# Patient Record
Sex: FEMALE | NOT HISPANIC OR LATINO | Employment: OTHER | URBAN - METROPOLITAN AREA
[De-identification: names, ages, dates, MRNs, and addresses within clinical notes are randomized per-mention and may not be internally consistent; named-entity substitution may affect disease eponyms.]

---

## 2017-03-28 ENCOUNTER — TELEPHONE (OUTPATIENT)
Dept: INTERNAL MEDICINE | Facility: CLINIC | Age: 67
End: 2017-03-28

## 2017-03-28 NOTE — TELEPHONE ENCOUNTER
Panel Management Review      Patient has the following on her problem list:     Asthma review     ACT Total Scores 10/28/2013   ACT TOTAL SCORE 18   ASTHMA ER VISITS 0 = None   ASTHMA HOSPITALIZATIONS 0 = None      1. Is Asthma diagnosis on the Problem List? Yes    2. Is Asthma listed on Health Maintenance? Yes    3. Patient is due for:  ACT and AAP      Composite cancer screening  Chart review shows that this patient is due/due soon for the following Pap Smear, Mammogram and Colonoscopy  Summary:    Patient is due/failing the following:   AAP, ACT, COLONOSCOPY, MAMMOGRAM and PAP    Action needed:   Patient needs office visit for as above.    Type of outreach:    Phone, left message for patient to call back.     Questions for provider review:    None                                                                                                                                    LIZ Camilo LPN       Chart routed to Wild.

## 2017-03-28 NOTE — LETTER
[facility]  [address], [address]  [place], [place]  [address]                                            TEL:[phone]  FAX:[phone]      Kalani Rowan  [address] [place] [address]      April 18, 2017    Dear Kalani,          At [facility], we care about your health and well-being. A review of your chart has indicated that you are due for a physical. Please contact us at [phone] to schedule an appointment.     If you have already had one or all of the above screening tests at another facility, please call us to update your chart.        Sincerely,      Lucia Lind M.D.

## 2017-04-04 ENCOUNTER — TELEPHONE (OUTPATIENT)
Dept: INTERNAL MEDICINE | Facility: CLINIC | Age: 67
End: 2017-04-04

## 2017-04-04 NOTE — TELEPHONE ENCOUNTER
4/4/2017    Call Regarding Preventive Health Screening Colonoscopy, Mammogram and Cervical/PAP    Attempt 1    Message on voicemail     Comments:       Outreach   CC

## 2017-07-15 ENCOUNTER — HEALTH MAINTENANCE LETTER (OUTPATIENT)
Age: 67
End: 2017-07-15

## 2018-02-06 ENCOUNTER — OFFICE VISIT (OUTPATIENT)
Dept: INTERNAL MEDICINE | Facility: CLINIC | Age: 68
End: 2018-02-06
Payer: COMMERCIAL

## 2018-02-06 VITALS
DIASTOLIC BLOOD PRESSURE: 80 MMHG | TEMPERATURE: 97.8 F | BODY MASS INDEX: 25.96 KG/M2 | SYSTOLIC BLOOD PRESSURE: 134 MMHG | HEIGHT: 59 IN | OXYGEN SATURATION: 98 % | HEART RATE: 76 BPM | WEIGHT: 128.8 LBS

## 2018-02-06 DIAGNOSIS — K21.9 GASTROESOPHAGEAL REFLUX DISEASE WITHOUT ESOPHAGITIS: ICD-10-CM

## 2018-02-06 DIAGNOSIS — M70.61 GREATER TROCHANTERIC BURSITIS OF RIGHT HIP: ICD-10-CM

## 2018-02-06 DIAGNOSIS — J45.20 MILD INTERMITTENT ASTHMA WITHOUT COMPLICATION: ICD-10-CM

## 2018-02-06 DIAGNOSIS — R10.31 ABDOMINAL PAIN, RIGHT LOWER QUADRANT: ICD-10-CM

## 2018-02-06 DIAGNOSIS — Z00.00 ROUTINE GENERAL MEDICAL EXAMINATION AT A HEALTH CARE FACILITY: Primary | ICD-10-CM

## 2018-02-06 LAB
ERYTHROCYTE [DISTWIDTH] IN BLOOD BY AUTOMATED COUNT: 13.5 % (ref 10–15)
HCT VFR BLD AUTO: 44.2 % (ref 35–47)
HGB BLD-MCNC: 15 G/DL (ref 11.7–15.7)
MCH RBC QN AUTO: 30.1 PG (ref 26.5–33)
MCHC RBC AUTO-ENTMCNC: 33.9 G/DL (ref 31.5–36.5)
MCV RBC AUTO: 89 FL (ref 78–100)
PLATELET # BLD AUTO: 287 10E9/L (ref 150–450)
RBC # BLD AUTO: 4.99 10E12/L (ref 3.8–5.2)
WBC # BLD AUTO: 5.8 10E9/L (ref 4–11)

## 2018-02-06 PROCEDURE — 99397 PER PM REEVAL EST PAT 65+ YR: CPT | Performed by: INTERNAL MEDICINE

## 2018-02-06 PROCEDURE — 86803 HEPATITIS C AB TEST: CPT | Performed by: INTERNAL MEDICINE

## 2018-02-06 PROCEDURE — 36415 COLL VENOUS BLD VENIPUNCTURE: CPT | Performed by: INTERNAL MEDICINE

## 2018-02-06 PROCEDURE — 84443 ASSAY THYROID STIM HORMONE: CPT | Performed by: INTERNAL MEDICINE

## 2018-02-06 PROCEDURE — 85027 COMPLETE CBC AUTOMATED: CPT | Performed by: INTERNAL MEDICINE

## 2018-02-06 PROCEDURE — 80061 LIPID PANEL: CPT | Performed by: INTERNAL MEDICINE

## 2018-02-06 PROCEDURE — 80053 COMPREHEN METABOLIC PANEL: CPT | Performed by: INTERNAL MEDICINE

## 2018-02-06 RX ORDER — AMOXICILLIN 500 MG
1400 CAPSULE ORAL DAILY
COMMUNITY
End: 2019-11-02

## 2018-02-06 ASSESSMENT — ACTIVITIES OF DAILY LIVING (ADL)
CURRENT_FUNCTION: NO ASSISTANCE NEEDED
I_NEED_ASSISTANCE_FOR_THE_FOLLOWING_DAILY_ACTIVITIES:: NO ASSISTANCE IS NEEDED

## 2018-02-06 NOTE — PROGRESS NOTES
SUBJECTIVE:   Kalani Rowan is a 67 year old female who presents for Preventive Visit.      Are you in the first 12 months of your Medicare coverage?  No    Physical   Annual:     Getting at least 3 servings of Calcium per day::  Yes    Bi-annual eye exam::  Yes    Dental care twice a year::  Yes    Sleep apnea or symptoms of sleep apnea::  None    Diet::  Low salt    Frequency of exercise::  2-3 days/week    Duration of exercise::  15-30 minutes    Taking medications regularly::  No    Barriers to taking medications::  None    Additional concerns today::  YES    Ability to successfully perform activities of daily living: no assistance needed  Home Safety:  No safety concerns identified  Hearing Impairment: no hearing concerns      Ability to successfully perform activities of daily living: Yes, no assistance needed  Home safety:  none identified   Hearing impairment: NO    Fall risk:     click delete button to remove this line now  COGNITIVE SCREEN  1) Repeat 3 items (Banana, Sunrise, Chair)    2) Clock draw: NORMAL  3) 3 item recall: Recalls 2 objects   Results: NORMAL clock, 1-2 items recalled: COGNITIVE IMPAIRMENT LESS LIKELY    Mini-CogTM Copyright ANGELA Palencia. Licensed by the author for use in [facility]; reprinted with permission (phyllis@[facility]). All rights reserved.        Reviewed and updated as needed this visit by clinical staff  Tobacco  Allergies  Meds  Med Hx  Surg Hx  Fam Hx  Soc Hx        Reviewed and updated as needed this visit by Provider        Social History   Substance Use Topics     Smoking status: Never Smoker     Smokeless tobacco: Never Used     Alcohol use No       Alcohol Use 2/6/2018   If you drink alcohol, do you typically have greater than 3 drinks per day OR greater than 7 drinks per week?   Not applicable               Today's PHQ-2 Score:   PHQ-2 ( 1999 [employer]) 2/6/2018   Q1: Little interest or pleasure in doing things -   Q2: Feeling down, depressed or hopeless -    PHQ-2 Score -   Q1: Little interest or pleasure in doing things Nearly every day   Q2: Feeling down, depressed or hopeless Not at all   PHQ-2 Score 3       Do you feel safe in your environment - Yes    Do you have a Health Care Directive?: No: Advance care planning was reviewed with patient; patient declined at this time.    Current providers sharing in care for this patient include:   Patient Care Team:  Lucia Lind MD as PCP - General    The following health maintenance items are reviewed in Epic and correct as of today:  Health Maintenance   Topic Date Due     TETANUS IMMUNIZATION (SYSTEM ASSIGNED)  09/27/1969     HEPATITIS C SCREENING  09/27/1969     COLON CANCER SCREEN (SYSTEM ASSIGNED)  09/27/2001     ASTHMA CONTROL TEST Q6 MOS  04/28/2014     ASTHMA ACTION PLAN Q1 YR  10/28/2014     MAMMO SCREEN Q2 YR (SYSTEM ASSIGNED)  11/04/2015     FALL RISK ASSESSMENT  09/27/2016     DEXA SCAN SCREENING (SYSTEM ASSIGNED)  09/27/2016     PNEUMOCOCCAL (1 of 2 - PCV13) 09/27/2016     PAP Q3 YR  10/28/2016     INFLUENZA VACCINE (SYSTEM ASSIGNED)  09/01/2017     ADVANCE DIRECTIVE PLANNING Q5 YRS  11/18/2018     LIPID SCREEN Q5 YR FEMALE (SYSTEM ASSIGNED)  04/27/2021     BP Readings from Last 3 Encounters:   02/06/18 134/80   04/27/16 132/88   05/11/14 150/74    Wt Readings from Last 3 Encounters:   02/06/18 128 lb 12.8 oz (58.4 kg)   04/27/16 127 lb 4.8 oz (57.7 kg)   01/22/14 127 lb (57.6 kg)                        Review of Systems  C: NEGATIVE for fever, chills, change in weight  I: NEGATIVE for worrisome rashes, moles or lesions  E: NEGATIVE for vision changes or irritation  E/M: NEGATIVE for ear, mouth and throat problems  R: NEGATIVE for significant cough or SOB  B: NEGATIVE for masses, tenderness or discharge  CV: NEGATIVE for chest pain, palpitations or peripheral edema  GI: she has been getting intermittent right upper quadrant pain that is sharp and lasts less than 10 minutes. She does have chronic  "constipation. No nausea or vomiting. Denies any fever chills or night sweats.   : NEGATIVE for frequency, dysuria, or hematuria  M: right lateral hip pian with walking or laying on that side for a couple months but getting betterN: NEGATIVE for weakness, dizziness or paresthesias  E: NEGATIVE for temperature intolerance, skin/hair changes  H: NEGATIVE for bleeding problems  P: NEGATIVE for changes in mood or affect    OBJECTIVE:   /80 (BP Location: Left arm, Patient Position: Sitting, Cuff Size: Adult Regular)  Pulse 76  Temp 97.8  F (36.6  C) (Oral)  Ht 4' 10.5\" (1.486 m)  Wt 128 lb 12.8 oz (58.4 kg)  SpO2 98%  Breastfeeding? No  BMI 26.46 kg/m2 Estimated body mass index is 26.46 kg/(m^2) as calculated from the following:    Height as of this encounter: 4' 10.5\" (1.486 m).    Weight as of this encounter: 128 lb 12.8 oz (58.4 kg).  Physical Exam  GENERAL: healthy, alert and no distress  EYES: Eyes grossly normal to inspection, PERRL and conjunctivae and sclerae normal  HENT: ear canals and TM's normal, nose and mouth without ulcers or lesions  NECK: no adenopathy, no asymmetry, masses, or scars and thyroid normal to palpation  RESP: lungs clear to auscultation - no rales, rhonchi or wheezes  BREAST: normal without masses, tenderness or nipple discharge and no palpable axillary masses or adenopathy  CV: regular rate and rhythm, normal S1 S2, no S3 or S4, no murmur, click or rub, no peripheral edema and peripheral pulses strong  ABDOMEN: soft, nontender, no hepatosplenomegaly, no masses and bowel sounds normal  MS: no gross musculoskeletal defects noted, no edema  SKIN: no suspicious lesions or rashes  NEURO: Normal strength and tone, mentation intact and speech normal  PSYCH: mentation appears normal, affect normal/bright    ASSESSMENT / PLAN:   1. Routine general medical examination at a health care facility     - GASTROENTEROLOGY ADULT REF PROCEDURE ONLY [facility] [phone]; No Provider " "Preference  - Hepatitis C Screen Reflex to HCV RNA Quant and Genotype  - *MA Screening Digital Bilateral; Future  - Comprehensive metabolic panel  - CBC with platelets  - TSH with free T4 reflex  - Lipid panel reflex to direct LDL Fasting    2. Abdominal pain, right lower quadrant  Will check ultrasound but suspect musculoskeletal vs constipation as cause of pain.  - US Abdomen Complete; Future    3. Greater trochanteric bursitis of right hip  Will follow clinically for now. As it seems to be improviong    4. Mild intermittent asthma without complication   under good control     5. Gastroesophageal reflux disease without esophagitis  under good control       End of Life Planning:  Patient currently has an advanced directive: No.  I have verified the patient's ablity to prepare an advanced directive/make health care decisions.  Literature was provided to assist patient in preparing an advanced directive.    COUNSELING:  Reviewed preventive health counseling, as reflected in patient instructions       Regular exercise       Healthy diet/nutrition    BP Screening:   Last 3 BP Readings:    BP Readings from Last 3 Encounters:   02/06/18 134/80   04/27/16 132/88   05/11/14 150/74       The following was recommended to the patient:  Re-screen BP within a year and recommended lifestyle modifications    Estimated body mass index is 26.46 kg/(m^2) as calculated from the following:    Height as of this encounter: 4' 10.5\" (1.486 m).    Weight as of this encounter: 128 lb 12.8 oz (58.4 kg).     reports that she has never smoked. She has never used smokeless tobacco.      Appropriate preventive services were discussed with this patient, including applicable screening as appropriate for cardiovascular disease, diabetes, osteopenia/osteoporosis, and glaucoma.  As appropriate for age/gender, discussed screening for colorectal cancer, prostate cancer, breast cancer, and cervical cancer. Checklist reviewing preventive services " available has been given to the patient.    Reviewed patients plan of care and provided an AVS. The Basic Care Plan (routine screening as documented in Health Maintenance) for Kalani meets the Care Plan requirement. This Care Plan has been established and reviewed with the Patient.    Counseling Resources:  ATP IV Guidelines  Pooled Cohorts Equation Calculator  Breast Cancer Risk Calculator  FRAX Risk Assessment  ICSI Preventive Guidelines  Dietary Guidelines for Americans, 2010  USDA's MyPlate  ASA Prophylaxis  Lung CA Screening    Lucia Lind MD  [facility]

## 2018-02-06 NOTE — NURSING NOTE
"Chief Complaint   Patient presents with     Physical       Initial /80 (BP Location: Left arm, Patient Position: Sitting, Cuff Size: Adult Regular)  Pulse 76  Temp 97.8  F (36.6  C) (Oral)  Ht 4' 10.5\" (1.486 m)  Wt 128 lb 12.8 oz (58.4 kg)  SpO2 98%  Breastfeeding? No  BMI 26.46 kg/m2 Estimated body mass index is 26.46 kg/(m^2) as calculated from the following:    Height as of this encounter: 4' 10.5\" (1.486 m).    Weight as of this encounter: 128 lb 12.8 oz (58.4 kg).  Medication Reconciliation: complete   IA SMA    "

## 2018-02-06 NOTE — MR AVS SNAPSHOT
After Visit Summary   2/6/2018    Kalani Rowan    MRN: [id number]           Patient Information     Date Of Birth          1950        Visit Information        Provider Department      2/6/2018 8:20 AM Lucia Lind MD [facility]        Today's Diagnoses     Routine general medical examination at a health care facility    -  1    Abdominal pain, right lower quadrant        Greater trochanteric bursitis of right hip        Mild intermittent asthma without complication        Gastroesophageal reflux disease without esophagitis           Follow-ups after your visit        Additional Services     GASTROENTEROLOGY ADULT REF PROCEDURE ONLY [facility] [phone]; No Provider Preference       Last Lab Result: Creatinine (mg/dL)       Date                     Value                 04/27/2016               0.68             ----------  Body mass index is 26.46 kg/(m^2).     Needed:  No  Language:  English    Patient will be contacted to schedule procedure.     Please be aware that coverage of these services is subject to the terms and limitations of your health insurance plan.  Call member services at your health plan with any benefit or coverage questions.  Any procedures must be performed at a [facility] facility OR coordinated by your clinic's referral office.    Please bring the following with you to your appointment:    (1) Any X-Rays, CTs or MRIs which have been performed.  Contact the facility where they were done to arrange for  prior to your scheduled appointment.    (2) List of current medications   (3) This referral request   (4) Any documents/labs given to you for this referral                  Future tests that were ordered for you today     Open Future Orders        Priority Expected Expires Ordered    *MA Screening Digital Bilateral Routine  2/6/2019 2/6/2018    Lipid Profile Routine  5/9/2018 2/6/2018    US Abdomen Complete Routine  2/6/2019  "2018            Who to contact     If you have questions or need follow up information about today's clinic visit or your schedule please contact [facility] directly at [phone].  Normal or non-critical lab and imaging results will be communicated to you by MyChart, letter or phone within 4 business days after the clinic has received the results. If you do not hear from us within 7 days, please contact the clinic through MyChart or phone. If you have a critical or abnormal lab result, we will notify you by phone as soon as possible.  Submit refill requests through [employer] or call your pharmacy and they will forward the refill request to us. Please allow 3 business days for your refill to be completed.          Additional Information About Your Visit        [facility]State [facility] CenterLast Guide Information     [employer] lets you send messages to your doctor, view your test results, renew your prescriptions, schedule appointments and more. To sign up, go to [email or web address] . Click on \"Log in\" on the left side of the screen, which will take you to the Welcome page. Then click on \"Sign up Now\" on the right side of the page.     You will be asked to enter the access code listed below, as well as some personal information. Please follow the directions to create your username and password.     Your access code is: [email or web address]  Expires: 2018  9:28 AM     Your access code will  in 90 days. If you need help or a new code, please call your [facility] clinic or [phone].        Care EveryWhere ID     This is your Care EveryWhere ID. This could be used by other organizations to access your [facility] medical records  [id number]        Your Vitals Were     Pulse Temperature Height Pulse Oximetry Breastfeeding? BMI (Body Mass Index)    76 97.8  F (36.6  C) (Oral) 4' 10.5\" (1.486 m) 98% No 26.46 kg/m2       Blood Pressure from Last 3 Encounters:   18 134/80   16 132/88   14 150/74    Weight from Last " 3 Encounters:   02/06/18 128 lb 12.8 oz (58.4 kg)   04/27/16 127 lb 4.8 oz (57.7 kg)   01/22/14 127 lb (57.6 kg)              We Performed the Following     CBC with platelets     Comprehensive metabolic panel     GASTROENTEROLOGY ADULT REF PROCEDURE ONLY [facility] [phone]; No Provider Preference     Hepatitis C Screen Reflex to HCV RNA Quant and Genotype     TSH with free T4 reflex        Primary Care Provider Office Phone # Fax #    Luciarobe Lind [phone]       [address] [facility] [address]  [facility] [address]        Equal Access to Services     [facility]: Hadii aad ku hadasho Soomaali, waaxda luqadaha, qaybta kaalmada adeegyada, leodan lopez . So [facility] [phone].    ATENCIÓN: Si habla español, tiene a sampson disposición servicios gratuitos de asistencia lingüística. Llame al [phone].    We comply with applicable federal civil rights laws and [place] laws. We do not discriminate on the basis of race, color, national origin, age, disability, sex, sexual orientation, or gender identity.            Thank you!     Thank you for choosing [facility]  for your care. Our goal is always to provide you with excellent care. Hearing back from our patients is one way we can continue to improve our services. Please take a few minutes to complete the written survey that you may receive in the mail after your visit with us. Thank you!             Your Updated Medication List - Protect others around you: Learn how to safely use, store and throw away your medicines at www.disposemymeds.org.          This list is accurate as of 2/6/18  9:28 AM.  Always use your most recent med list.                   Brand Name Dispense Instructions for use Diagnosis    albuterol 108 (90 BASE) MCG/ACT Inhaler    PROAIR HFA    1 Inhaler    Inhale 1-2 puffs into the lungs every 6 hours as needed for shortness of breath / dyspnea    Mild intermittent asthma        ALLEGRA 180 MG tablet   Generic drug:  fexofenadine     1 month    TAKE ONE DAILY    Allergic rhinitis, cause unspecified       diphenhydrAMINE 25 MG tablet    BENADRYL    56 tablet    Take 2 tablets (50 mg) by mouth every 6 hours as needed for itching or allergies        EPINEPHrine 0.3 MG/0.3ML injection 2-pack    EPIPEN/ADRENACLICK/or ANY BX GENERIC EQUIV    1 each    Inject 0.3 mLs (0.3 mg) into the muscle once as needed for anaphylaxis        fish Oil 1200 MG capsule      Take 1,400 mg by mouth daily        NASONEX 50 MCG/ACT spray   Generic drug:  mometasone     1    INHALE 2 SPRAYS IN EACH NOSTRIL ONE DAILY    Mild intermittent asthma

## 2018-02-07 LAB
ALBUMIN SERPL-MCNC: 4.1 G/DL (ref 3.4–5)
ALP SERPL-CCNC: 120 U/L (ref 40–150)
ALT SERPL W P-5'-P-CCNC: 27 U/L (ref 0–50)
ANION GAP SERPL CALCULATED.3IONS-SCNC: 11 MMOL/L (ref 3–14)
AST SERPL W P-5'-P-CCNC: 21 U/L (ref 0–45)
BILIRUB SERPL-MCNC: 0.4 MG/DL (ref 0.2–1.3)
BUN SERPL-MCNC: 16 MG/DL (ref 7–30)
CALCIUM SERPL-MCNC: 9.5 MG/DL (ref 8.5–10.1)
CHLORIDE SERPL-SCNC: 108 MMOL/L (ref 94–109)
CHOLEST SERPL-MCNC: 324 MG/DL
CO2 SERPL-SCNC: 24 MMOL/L (ref 20–32)
CREAT SERPL-MCNC: 0.72 MG/DL (ref 0.52–1.04)
GFR SERPL CREATININE-BSD FRML MDRD: 81 ML/MIN/1.7M2
GLUCOSE SERPL-MCNC: 87 MG/DL (ref 70–99)
HCV AB SERPL QL IA: NONREACTIVE
HDLC SERPL-MCNC: 47 MG/DL
LDLC SERPL CALC-MCNC: 230 MG/DL
NONHDLC SERPL-MCNC: 277 MG/DL
POTASSIUM SERPL-SCNC: 3.9 MMOL/L (ref 3.4–5.3)
PROT SERPL-MCNC: 7.4 G/DL (ref 6.8–8.8)
SODIUM SERPL-SCNC: 143 MMOL/L (ref 133–144)
TRIGL SERPL-MCNC: 237 MG/DL
TSH SERPL DL<=0.005 MIU/L-ACNC: 2.57 MU/L (ref 0.4–4)

## 2018-02-07 ASSESSMENT — ASTHMA QUESTIONNAIRES: ACT_TOTALSCORE: 25

## 2018-02-16 ENCOUNTER — HOSPITAL ENCOUNTER (OUTPATIENT)
Dept: ULTRASOUND IMAGING | Facility: CLINIC | Age: 68
Discharge: HOME | End: 2018-02-16
Attending: INTERNAL MEDICINE | Admitting: INTERNAL MEDICINE
Payer: MEDICARE

## 2018-02-16 ENCOUNTER — HOSPITAL ENCOUNTER (OUTPATIENT)
Dept: MAMMOGRAPHY | Facility: CLINIC | Age: 68
End: 2018-02-16
Attending: INTERNAL MEDICINE
Payer: MEDICARE

## 2018-02-16 DIAGNOSIS — R10.31 ABDOMINAL PAIN, RIGHT LOWER QUADRANT: ICD-10-CM

## 2018-02-16 DIAGNOSIS — Z12.39 BREAST SCREENING: ICD-10-CM

## 2018-02-16 PROCEDURE — 76700 US EXAM ABDOM COMPLETE: CPT

## 2018-02-16 PROCEDURE — 77067 SCR MAMMO BI INCL CAD: CPT

## 2018-02-19 ENCOUNTER — TELEPHONE (OUTPATIENT)
Dept: INTERNAL MEDICINE | Facility: CLINIC | Age: 68
End: 2018-02-19

## 2018-02-19 NOTE — TELEPHONE ENCOUNTER
Please call her and let her know Her ultrasound looks normal she does have a small 1/2 area of dilated blood vessel in the liver called hemangioma but that would not cause her pain and is nothing to worry about. Mail her copy of results as well please

## 2018-03-07 ENCOUNTER — TRANSFERRED RECORDS (OUTPATIENT)
Dept: HEALTH INFORMATION MANAGEMENT | Facility: CLINIC | Age: 68
End: 2018-03-07

## 2018-04-06 ENCOUNTER — TRANSFERRED RECORDS (OUTPATIENT)
Dept: HEALTH INFORMATION MANAGEMENT | Facility: CLINIC | Age: 68
End: 2018-04-06

## 2018-04-16 ENCOUNTER — TRANSFERRED RECORDS (OUTPATIENT)
Dept: HEALTH INFORMATION MANAGEMENT | Facility: CLINIC | Age: 68
End: 2018-04-16

## 2018-04-30 ENCOUNTER — TELEPHONE (OUTPATIENT)
Dept: INTERNAL MEDICINE | Facility: CLINIC | Age: 68
End: 2018-04-30

## 2018-04-30 NOTE — TELEPHONE ENCOUNTER
Panel Management Review      Patient has the following on her problem list:     Asthma review     ACT Total Scores 2/6/2018   ACT TOTAL SCORE -   ASTHMA ER VISITS -   ASTHMA HOSPITALIZATIONS -   ACT TOTAL SCORE (Goal Greater than or Equal to 20) 25   In the past 12 months, how many times did you visit the emergency room for your asthma without being admitted to the hospital? 0   In the past 12 months, how many times were you hospitalized overnight because of your asthma? 0      1. Is Asthma diagnosis on the Problem List? Yes    2. Is Asthma listed on Health Maintenance? Yes    3. Patient is due for:  CUONG ALFREDO   ASA:     Last LDL:    Lab Results   Component Value Date    CHOL 324 02/06/2018     Lab Results   Component Value Date    HDL 47 02/06/2018     Lab Results   Component Value Date     02/06/2018     Lab Results   Component Value Date    TRIG 237 02/06/2018        Lab Results   Component Value Date    CHOLHDLRATIO 7.4 11/18/2013        Is the patient on a Statin? NO   Is the patient on Aspirin? YES                    Last three blood pressure readings:  BP Readings from Last 3 Encounters:   02/06/18 134/80   04/27/16 132/88   05/11/14 150/74        Tobacco History:     History   Smoking Status     Never Smoker   Smokeless Tobacco     Never Used         Composite cancer screening  Chart review shows that this patient is due/due soon for the following Pap Smear and Colonoscopy  Summary:    Patient is due/failing the following:   COLONOSCOPY    Action needed:   Patient needs referral/order: colonoscopy    Type of outreach:    Sent letter.    Questions for provider review:    OK to turn off pap? Will review at next visit as provider on SHABBIR Camilo LPN       Chart routed to none.

## 2018-04-30 NOTE — LETTER
[facility]  [address], [address]  [place], [place]  [address]                                            TEL:[phone]  FAX:[phone]      Kalani Rowan  [address] [place] [address]      April 30, 2018    Dear Kalani,          At [facility], we care about your health and well-being. A review of your chart has indicated that you are due for a colonoscopy.     If you have already had one or all of the above screening tests at another facility, please call us to update your chart.        Sincerely,      Lucia Lind M.D.

## 2019-11-02 ENCOUNTER — APPOINTMENT (OUTPATIENT)
Dept: CT IMAGING | Facility: CLINIC | Age: 69
DRG: 305 | End: 2019-11-02
Attending: EMERGENCY MEDICINE
Payer: COMMERCIAL

## 2019-11-02 ENCOUNTER — APPOINTMENT (OUTPATIENT)
Dept: MRI IMAGING | Facility: CLINIC | Age: 69
DRG: 305 | End: 2019-11-02
Attending: INTERNAL MEDICINE
Payer: COMMERCIAL

## 2019-11-02 ENCOUNTER — HOSPITAL ENCOUNTER (INPATIENT)
Facility: CLINIC | Age: 69
LOS: 1 days | Discharge: HOME | DRG: 305 | End: 2019-11-03
Attending: EMERGENCY MEDICINE | Admitting: INTERNAL MEDICINE
Payer: COMMERCIAL

## 2019-11-02 DIAGNOSIS — I63.9 CEREBROVASCULAR ACCIDENT (CVA), UNSPECIFIED MECHANISM (H): ICD-10-CM

## 2019-11-02 LAB
ALBUMIN SERPL-MCNC: 4.4 G/DL (ref 3.4–5)
ALBUMIN UR-MCNC: NEGATIVE MG/DL
ALP SERPL-CCNC: 155 U/L (ref 40–150)
ALT SERPL W P-5'-P-CCNC: 36 U/L (ref 0–50)
ANION GAP SERPL CALCULATED.3IONS-SCNC: 8 MMOL/L (ref 3–14)
APPEARANCE UR: CLEAR
APTT PPP: 27 SEC (ref 22–37)
AST SERPL W P-5'-P-CCNC: 22 U/L (ref 0–45)
BASOPHILS # BLD AUTO: 0 10E9/L (ref 0–0.2)
BASOPHILS NFR BLD AUTO: 0.4 %
BILIRUB DIRECT SERPL-MCNC: <0.1 MG/DL (ref 0–0.2)
BILIRUB SERPL-MCNC: 0.4 MG/DL (ref 0.2–1.3)
BILIRUB UR QL STRIP: NEGATIVE
BUN SERPL-MCNC: 19 MG/DL (ref 7–30)
CALCIUM SERPL-MCNC: 9.6 MG/DL (ref 8.5–10.1)
CHLORIDE SERPL-SCNC: 106 MMOL/L (ref 94–109)
CHOLEST SERPL-MCNC: 377 MG/DL
CO2 SERPL-SCNC: 23 MMOL/L (ref 20–32)
COLOR UR AUTO: NORMAL
CREAT SERPL-MCNC: 0.65 MG/DL (ref 0.52–1.04)
DIFFERENTIAL METHOD BLD: ABNORMAL
EOSINOPHIL # BLD AUTO: 0.1 10E9/L (ref 0–0.7)
EOSINOPHIL NFR BLD AUTO: 1.9 %
ERYTHROCYTE [DISTWIDTH] IN BLOOD BY AUTOMATED COUNT: 13.3 % (ref 10–15)
GFR SERPL CREATININE-BSD FRML MDRD: >90 ML/MIN/{1.73_M2}
GLUCOSE BLDC GLUCOMTR-MCNC: 131 MG/DL (ref 70–99)
GLUCOSE SERPL-MCNC: 107 MG/DL (ref 70–99)
GLUCOSE UR STRIP-MCNC: NEGATIVE MG/DL
HBA1C MFR BLD: 5.3 % (ref 0–5.6)
HCT VFR BLD AUTO: 47.2 % (ref 35–47)
HDLC SERPL-MCNC: 54 MG/DL
HGB BLD-MCNC: 16.6 G/DL (ref 11.7–15.7)
HGB UR QL STRIP: NEGATIVE
IMM GRANULOCYTES # BLD: 0 10E9/L (ref 0–0.4)
IMM GRANULOCYTES NFR BLD: 0.1 %
INR PPP: 0.88 (ref 0.86–1.14)
INTERPRETATION ECG - MUSE: NORMAL
KETONES UR STRIP-MCNC: NEGATIVE MG/DL
LDLC SERPL CALC-MCNC: ABNORMAL MG/DL
LDLC SERPL DIRECT ASSAY-MCNC: 247 MG/DL
LEUKOCYTE ESTERASE UR QL STRIP: NEGATIVE
LYMPHOCYTES # BLD AUTO: 2.8 10E9/L (ref 0.8–5.3)
LYMPHOCYTES NFR BLD AUTO: 39.9 %
MCH RBC QN AUTO: 30.7 PG (ref 26.5–33)
MCHC RBC AUTO-ENTMCNC: 35.2 G/DL (ref 31.5–36.5)
MCV RBC AUTO: 87 FL (ref 78–100)
MONOCYTES # BLD AUTO: 0.4 10E9/L (ref 0–1.3)
MONOCYTES NFR BLD AUTO: 5.2 %
NEUTROPHILS # BLD AUTO: 3.6 10E9/L (ref 1.6–8.3)
NEUTROPHILS NFR BLD AUTO: 52.5 %
NITRATE UR QL: NEGATIVE
NONHDLC SERPL-MCNC: 323 MG/DL
NRBC # BLD AUTO: 0 10*3/UL
NRBC BLD AUTO-RTO: 0 /100
PH UR STRIP: 6.5 PH (ref 5–7)
PLATELET # BLD AUTO: 286 10E9/L (ref 150–450)
POTASSIUM SERPL-SCNC: 3.6 MMOL/L (ref 3.4–5.3)
PROT SERPL-MCNC: 8.8 G/DL (ref 6.8–8.8)
RBC # BLD AUTO: 5.41 10E12/L (ref 3.8–5.2)
RBC #/AREA URNS AUTO: 1 /HPF (ref 0–2)
SODIUM SERPL-SCNC: 137 MMOL/L (ref 133–144)
SOURCE: NORMAL
SP GR UR STRIP: 1.01 (ref 1–1.03)
SQUAMOUS #/AREA URNS AUTO: <1 /HPF (ref 0–1)
TRIGL SERPL-MCNC: 547 MG/DL
TROPONIN I SERPL-MCNC: <0.015 UG/L (ref 0–0.04)
TSH SERPL DL<=0.005 MIU/L-ACNC: 3.94 MU/L (ref 0.4–4)
UROBILINOGEN UR STRIP-MCNC: NORMAL MG/DL (ref 0–2)
WBC # BLD AUTO: 6.9 10E9/L (ref 4–11)
WBC #/AREA URNS AUTO: <1 /HPF (ref 0–5)

## 2019-11-02 PROCEDURE — 25000132 ZZH RX MED GY IP 250 OP 250 PS 637: Performed by: EMERGENCY MEDICINE

## 2019-11-02 PROCEDURE — 99291 CRITICAL CARE FIRST HOUR: CPT | Performed by: PHYSICIAN ASSISTANT

## 2019-11-02 PROCEDURE — 70498 CT ANGIOGRAPHY NECK: CPT

## 2019-11-02 PROCEDURE — 85610 PROTHROMBIN TIME: CPT | Performed by: EMERGENCY MEDICINE

## 2019-11-02 PROCEDURE — 85730 THROMBOPLASTIN TIME PARTIAL: CPT | Performed by: EMERGENCY MEDICINE

## 2019-11-02 PROCEDURE — 25800030 ZZH RX IP 258 OP 636: Performed by: EMERGENCY MEDICINE

## 2019-11-02 PROCEDURE — 70553 MRI BRAIN STEM W/O & W/DYE: CPT

## 2019-11-02 PROCEDURE — 80076 HEPATIC FUNCTION PANEL: CPT | Performed by: EMERGENCY MEDICINE

## 2019-11-02 PROCEDURE — 25800030 ZZH RX IP 258 OP 636: Performed by: INTERNAL MEDICINE

## 2019-11-02 PROCEDURE — 81001 URINALYSIS AUTO W/SCOPE: CPT | Performed by: INTERNAL MEDICINE

## 2019-11-02 PROCEDURE — 25000132 ZZH RX MED GY IP 250 OP 250 PS 637: Performed by: INTERNAL MEDICINE

## 2019-11-02 PROCEDURE — 25500064 ZZH RX 255 OP 636: Performed by: INTERNAL MEDICINE

## 2019-11-02 PROCEDURE — 99222 1ST HOSP IP/OBS MODERATE 55: CPT | Mod: AI | Performed by: INTERNAL MEDICINE

## 2019-11-02 PROCEDURE — 25000128 H RX IP 250 OP 636: Performed by: EMERGENCY MEDICINE

## 2019-11-02 PROCEDURE — A9585 GADOBUTROL INJECTION: HCPCS | Performed by: INTERNAL MEDICINE

## 2019-11-02 PROCEDURE — 84484 ASSAY OF TROPONIN QUANT: CPT | Performed by: EMERGENCY MEDICINE

## 2019-11-02 PROCEDURE — 25000125 ZZHC RX 250: Performed by: EMERGENCY MEDICINE

## 2019-11-02 PROCEDURE — 96360 HYDRATION IV INFUSION INIT: CPT

## 2019-11-02 PROCEDURE — 12000000 ZZH R&B MED SURG/OB

## 2019-11-02 PROCEDURE — 80061 LIPID PANEL: CPT | Performed by: EMERGENCY MEDICINE

## 2019-11-02 PROCEDURE — 85025 COMPLETE CBC W/AUTO DIFF WBC: CPT | Performed by: EMERGENCY MEDICINE

## 2019-11-02 PROCEDURE — 83721 ASSAY OF BLOOD LIPOPROTEIN: CPT | Performed by: EMERGENCY MEDICINE

## 2019-11-02 PROCEDURE — 70450 CT HEAD/BRAIN W/O DYE: CPT

## 2019-11-02 PROCEDURE — 80048 BASIC METABOLIC PNL TOTAL CA: CPT | Performed by: EMERGENCY MEDICINE

## 2019-11-02 PROCEDURE — 00000146 ZZHCL STATISTIC GLUCOSE BY METER IP

## 2019-11-02 PROCEDURE — 0042T CT HEAD PERFUSION WITH CONTRAST: CPT

## 2019-11-02 PROCEDURE — 93005 ELECTROCARDIOGRAM TRACING: CPT

## 2019-11-02 PROCEDURE — 84443 ASSAY THYROID STIM HORMONE: CPT | Performed by: EMERGENCY MEDICINE

## 2019-11-02 PROCEDURE — 83036 HEMOGLOBIN GLYCOSYLATED A1C: CPT | Performed by: EMERGENCY MEDICINE

## 2019-11-02 PROCEDURE — 99285 EMERGENCY DEPT VISIT HI MDM: CPT | Mod: 25

## 2019-11-02 RX ORDER — POTASSIUM CHLORIDE 1.5 G/1.58G
20-40 POWDER, FOR SOLUTION ORAL
Status: DISCONTINUED | OUTPATIENT
Start: 2019-11-02 | End: 2019-11-03 | Stop reason: HOSPADM

## 2019-11-02 RX ORDER — AMOXICILLIN 250 MG
2 CAPSULE ORAL 2 TIMES DAILY PRN
Status: DISCONTINUED | OUTPATIENT
Start: 2019-11-02 | End: 2019-11-03 | Stop reason: HOSPADM

## 2019-11-02 RX ORDER — IOPAMIDOL 755 MG/ML
120 INJECTION, SOLUTION INTRAVASCULAR ONCE
Status: COMPLETED | OUTPATIENT
Start: 2019-11-02 | End: 2019-11-02

## 2019-11-02 RX ORDER — UBIDECARENONE 100 MG
100 CAPSULE ORAL DAILY
COMMUNITY
End: 2022-08-12

## 2019-11-02 RX ORDER — ACETAMINOPHEN 325 MG/1
650 TABLET ORAL EVERY 4 HOURS PRN
Status: DISCONTINUED | OUTPATIENT
Start: 2019-11-02 | End: 2019-11-03 | Stop reason: HOSPADM

## 2019-11-02 RX ORDER — ONDANSETRON 2 MG/ML
4 INJECTION INTRAMUSCULAR; INTRAVENOUS EVERY 6 HOURS PRN
Status: DISCONTINUED | OUTPATIENT
Start: 2019-11-02 | End: 2019-11-03 | Stop reason: HOSPADM

## 2019-11-02 RX ORDER — NALOXONE HYDROCHLORIDE 0.4 MG/ML
.1-.4 INJECTION, SOLUTION INTRAMUSCULAR; INTRAVENOUS; SUBCUTANEOUS
Status: DISCONTINUED | OUTPATIENT
Start: 2019-11-02 | End: 2019-11-03 | Stop reason: HOSPADM

## 2019-11-02 RX ORDER — POTASSIUM CHLORIDE 7.45 MG/ML
10 INJECTION INTRAVENOUS
Status: DISCONTINUED | OUTPATIENT
Start: 2019-11-02 | End: 2019-11-03 | Stop reason: HOSPADM

## 2019-11-02 RX ORDER — POTASSIUM CL/LIDO/0.9 % NACL 10MEQ/0.1L
10 INTRAVENOUS SOLUTION, PIGGYBACK (ML) INTRAVENOUS
Status: DISCONTINUED | OUTPATIENT
Start: 2019-11-02 | End: 2019-11-03 | Stop reason: HOSPADM

## 2019-11-02 RX ORDER — POTASSIUM CHLORIDE 29.8 MG/ML
20 INJECTION INTRAVENOUS
Status: DISCONTINUED | OUTPATIENT
Start: 2019-11-02 | End: 2019-11-02

## 2019-11-02 RX ORDER — SODIUM CHLORIDE 9 MG/ML
INJECTION, SOLUTION INTRAVENOUS CONTINUOUS
Status: DISCONTINUED | OUTPATIENT
Start: 2019-11-02 | End: 2019-11-03 | Stop reason: HOSPADM

## 2019-11-02 RX ORDER — ONDANSETRON 4 MG/1
4 TABLET, ORALLY DISINTEGRATING ORAL EVERY 6 HOURS PRN
Status: DISCONTINUED | OUTPATIENT
Start: 2019-11-02 | End: 2019-11-03 | Stop reason: HOSPADM

## 2019-11-02 RX ORDER — BISACODYL 10 MG
10 SUPPOSITORY, RECTAL RECTAL DAILY PRN
Status: DISCONTINUED | OUTPATIENT
Start: 2019-11-02 | End: 2019-11-03 | Stop reason: HOSPADM

## 2019-11-02 RX ORDER — AMOXICILLIN 250 MG
1 CAPSULE ORAL 2 TIMES DAILY PRN
Status: DISCONTINUED | OUTPATIENT
Start: 2019-11-02 | End: 2019-11-03 | Stop reason: HOSPADM

## 2019-11-02 RX ORDER — ASPIRIN 81 MG/1
324 TABLET, CHEWABLE ORAL ONCE
Status: COMPLETED | OUTPATIENT
Start: 2019-11-02 | End: 2019-11-02

## 2019-11-02 RX ORDER — HYDRALAZINE HYDROCHLORIDE 20 MG/ML
10-20 INJECTION INTRAMUSCULAR; INTRAVENOUS
Status: DISCONTINUED | OUTPATIENT
Start: 2019-11-02 | End: 2019-11-03 | Stop reason: HOSPADM

## 2019-11-02 RX ORDER — ACETAMINOPHEN 500 MG
1000 TABLET ORAL ONCE
Status: COMPLETED | OUTPATIENT
Start: 2019-11-02 | End: 2019-11-02

## 2019-11-02 RX ORDER — ACETAMINOPHEN 650 MG/1
650 SUPPOSITORY RECTAL EVERY 4 HOURS PRN
Status: DISCONTINUED | OUTPATIENT
Start: 2019-11-02 | End: 2019-11-03 | Stop reason: HOSPADM

## 2019-11-02 RX ORDER — POTASSIUM CHLORIDE 1500 MG/1
20-40 TABLET, EXTENDED RELEASE ORAL
Status: DISCONTINUED | OUTPATIENT
Start: 2019-11-02 | End: 2019-11-03 | Stop reason: HOSPADM

## 2019-11-02 RX ORDER — LABETALOL HYDROCHLORIDE 5 MG/ML
10-40 INJECTION, SOLUTION INTRAVENOUS EVERY 10 MIN PRN
Status: DISCONTINUED | OUTPATIENT
Start: 2019-11-02 | End: 2019-11-03 | Stop reason: HOSPADM

## 2019-11-02 RX ORDER — ATORVASTATIN CALCIUM 40 MG/1
40 TABLET, FILM COATED ORAL
Status: DISCONTINUED | OUTPATIENT
Start: 2019-11-02 | End: 2019-11-03

## 2019-11-02 RX ORDER — GADOBUTROL 604.72 MG/ML
5 INJECTION INTRAVENOUS ONCE
Status: COMPLETED | OUTPATIENT
Start: 2019-11-02 | End: 2019-11-02

## 2019-11-02 RX ORDER — LIDOCAINE 40 MG/G
CREAM TOPICAL
Status: DISCONTINUED | OUTPATIENT
Start: 2019-11-02 | End: 2019-11-03 | Stop reason: HOSPADM

## 2019-11-02 RX ADMIN — SODIUM CHLORIDE 1000 ML: 9 INJECTION, SOLUTION INTRAVENOUS at 15:33

## 2019-11-02 RX ADMIN — SODIUM CHLORIDE: 9 INJECTION, SOLUTION INTRAVENOUS at 17:28

## 2019-11-02 RX ADMIN — SODIUM CHLORIDE 100 ML: 9 INJECTION, SOLUTION INTRAVENOUS at 14:19

## 2019-11-02 RX ADMIN — IOPAMIDOL 120 ML: 755 INJECTION, SOLUTION INTRAVENOUS at 14:19

## 2019-11-02 RX ADMIN — ACETAMINOPHEN 650 MG: 325 TABLET, FILM COATED ORAL at 23:30

## 2019-11-02 RX ADMIN — GADOBUTROL 5 ML: 604.72 INJECTION INTRAVENOUS at 22:28

## 2019-11-02 RX ADMIN — ACETAMINOPHEN 1000 MG: 500 TABLET, FILM COATED ORAL at 15:37

## 2019-11-02 RX ADMIN — ASPIRIN 81 MG 324 MG: 81 TABLET ORAL at 15:37

## 2019-11-02 ASSESSMENT — ACTIVITIES OF DAILY LIVING (ADL)
TRANSFERRING: 0-->INDEPENDENT
DRESS: 0-->INDEPENDENT
AMBULATION: 0-->INDEPENDENT
SWALLOWING: 0-->SWALLOWS FOODS/LIQUIDS WITHOUT DIFFICULTY
WHICH_OF_THE_ABOVE_FUNCTIONAL_RISKS_HAD_A_RECENT_ONSET_OR_CHANGE?: COMMUNICATION/SPEECH
TOILETING: 0-->INDEPENDENT
RETIRED_EATING: 0-->INDEPENDENT
FALL_HISTORY_WITHIN_LAST_SIX_MONTHS: NO
COGNITION: 0 - NO COGNITION ISSUES REPORTED
RETIRED_COMMUNICATION: 0-->UNDERSTANDS/COMMUNICATES WITHOUT DIFFICULTY
BATHING: 0-->INDEPENDENT
ADLS_ACUITY_SCORE: 11

## 2019-11-02 ASSESSMENT — ENCOUNTER SYMPTOMS
SHORTNESS OF BREATH: 0
HEADACHES: 1
NUMBNESS: 0

## 2019-11-02 ASSESSMENT — MIFFLIN-ST. JEOR: SCORE: 1150.04

## 2019-11-02 NOTE — CONSULTS
[facility]    Stroke Consult Note    Reason for Consult: stroke code    Chief Complaint: Headache      HPI  Kalani Rowan is a 69 year old woman with no significant past medical history per her , who takes no prescription medications at home. This morning she was in her usual state of health and spoke to her son on the phone at 11:30. He then saw her at 12 noon and she seemed normal. Then suddenly between 12:30-12:45  They noticed that she was mixing up her words, and she complained of severe headache, nausea, and dizziness. They checked her BP at home and it was 190 systolic. Upon presentation to the ED it was 195/84 and she was unable to speak well but otherwise had a nonfocal neurologic exam.    Her  reports no prior neurologic diagnoses, and no history of migraine. She did have two syncopal events 10 years ago for which an EEG was checked and was normal. Other than that she has not been ill recently. She takes no blood thinners. Her first language is Swedish but she speaks English fluently. 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 When in the ED she was answering some questions more in Swedish than in English.    Imaging done in the ED with CT, CTA head/neck and CTP head was unremarkable as below    TPA Treatment   Not given due to minor / isolated / quickly resolving stroke symptoms.    Endovascular Treatment  Not initiated due to absence of proximal vessel occlusion    Impression  Aphasia concerning for stroke   The patient's initial presentation was disabling aphasia and she presented within the tPA window. Head CT showed no acute intracranial hemorrhage, and tPA was ordered. However, she rapidly improved upon reexamining her after CT. Therefore the tPA was cancelled as rapid improvement of stroke is a contraindication. This was discussed with the patient's  at the bedside as well as other family members. Dr. Rachael Rios also participated in the discussion. Although  "stroke remains in the differential, due to low NIHSS and rapid improvement, discussed that risk outweighs benefit for tPA. Differential includes hypertensive encephalopathy (in which case would lower BP if MRI negative for stroke) vs TIA vs migraine.    Recommendations  Acute Ischemic Stroke (without tPA) Recommendations  - Neurochecks Q 4 hours  - Permissive HTN; labetalol PRN for SBP > 220  - Daily aspirin 325 mg for secondary stroke prevention  - Statin: TBD based on lipid panel  - MRI Stroke Protocol  - TTE with Bubble Study  - Telemetry, EKG  - Bedside Glucose Monitoring  - A1c, Lipid Panel, Troponin x 3  - PT/OT/SLP  - Stroke Class per [facility]  - Euthermia, Euglycemia    Patient Follow-up    - final recommendation pending work-up    Thank you for this consult. We will continue to follow.     Michelle Barriga PA-C  Neurology  11/02/2019 3:11 PM  Text Page (8am-5pm)  To page stroke neurology after hours or on a subsequent day, click here: [facility]  Choose \"On Call\" tab at top, then search dropdown box for \"Neurology Adult\" & press Enter, look for Neuro ICU/Stroke    ______________________________________________________    Past Medical History   Past Medical History:   Diagnosis Date     Allergy, unspecified not elsewhere classified      Past Surgical History   Past Surgical History:   Procedure Laterality Date     C NONSPECIFIC PROCEDURE      S/P Right Tracheotomy Age 6 in [place] \"choking on nuts\"     C NONSPECIFIC PROCEDURE      Left Foot Cyst     Medications   Home Meds  Medication Sig   albuterol (ALBUTEROL) 108 (90 BASE) MCG/ACT inhaler Inhale 1-2 puffs into the lungs every 6 hours as needed for shortness of breath / dyspnea  Patient not taking: Reported on 2/6/2018   ALLEGRA 180 MG OR TABS TAKE ONE DAILY   diphenhydrAMINE (BENADRYL) 25 MG tablet Take 2 tablets (50 mg) by mouth every 6 hours as needed for itching or allergies   EPINEPHrine (EPIPEN) 0.3 MG/0.3ML injection Inject 0.3 mLs " "(0.3 mg) into the muscle once as needed for anaphylaxis  Patient not taking: Reported on 2/6/2018   NASONEX 50 MCG/ACT NA SUSP INHALE 2 SPRAYS IN EACH NOSTRIL ONE DAILY  Patient not taking: No sig reported   Omega-3 Fatty Acids (FISH OIL) 1200 MG capsule Take 1,400 mg by mouth daily       Scheduled Meds      Infusion Meds      PRN Meds      Allergies   Allergies   Allergen Reactions     Codeine      Elderberry [Sambucus Canadensis]      Elderberry syrup- ER.      Family History   Family History   Problem Relation Age of Onset     Colon Cancer Mother      Other Cancer Father         skin     Heart Failure Father      Social History   Social History     Tobacco Use     Smoking status: Never Smoker     Smokeless tobacco: Never Used   Substance Use Topics     Alcohol use: No     Alcohol/week: 0.0 standard drinks     Drug use: Not on file       Review of Systems   The 10 point Review of Systems is negative other than noted in the HPI or here.        PHYSICAL EXAMINATION  Temp:  [98.1  F (36.7  C)] 98.1  F (36.7  C)  Pulse:  [80-92] 80  Heart Rate:  [83-96] 83  Resp:  [12-37] 20  BP: (162-195)/(83-93) 163/93  SpO2:  [95 %-100 %] 98 %     General:  patient lying in bed without any acute distress    HEENT:  normocephalic/atraumatic  Pulmonary:  no respiratory distress    Neurologic  Mental Status:  alert, somewhat slow to respond.   First exam pre-ct: cannot state month in english, only Malian. When asked month in Malian first says \"cold\" Knows own age in english. Cannot name a thumb. Cannot follow most 1-step commands.   After CT:  Knows month and age, follows 2 and 3-step commands, repeats a sentence with only a few errors one would expect from a non-native english speaker. Repeated a sentence for  in Malian. Specifically asks if this could be a \"tia or small stroke\"    Cranial Nerves:  visual fields intact, PERRL, EOMI with normal smooth pursuit, facial sensation intact and symmetric, facial movements " symmetric, hearing not formally tested but intact to conversation, palate elevation symmetric and uvula midline, no dysarthria, shoulder shrug strong bilaterally, tongue protrusion midline  Motor:  normal muscle tone and bulk, no abnormal movements, able to move all limbs spontaneously, strength 5/5 throughout upper and lower extremities, no pronator drift  Sensory:  light touch sensation intact and symmetric throughout upper and lower extremities, no extinction on double simultaneous stimulation   Coordination:  normal finger-to-nose and heel-to-shin bilaterally without dysmetria, rapid alternating movements symmetric  Station/Gait:  deferred      Dysphagia Screen  Per Nursing    Stroke Scales    NIHSS  Interval     Interval Comments     1a. Level of Consciousness 0-->Alert, keenly responsive   1b. LOC Questions 1-->Answers one question correctly   1c. LOC Commands 1-->Performs one task correctly   2.   Best Gaze 0-->Normal   3.   Visual 0-->No visual loss   4.   Facial Palsy 0-->Normal symmetrical movements   5a. Motor Arm, Left 0-->No drift, limb holds 90 (or 45) degrees for full 10 secs   5b. Motor Arm, Right 0-->No drift, limb holds 90 (or 45) degrees for full 10 secs   6a. Motor Leg, Left 0-->No drift, leg holds 30 degree position for full 5 secs   6b. Motor Leg, right 0-->No drift, leg holds 30 degree position for full 5 secs   7.   Limb Ataxia 0-->Absent   8.   Sensory 0-->Normal, no sensory loss   9.   Best Language 1-->Mild-to-moderate aphasia, some obvious loss of fluency or facility of comprehension, without significant limitation on ideas expressed or form of expression. Reduction of speech and/or comprehension, however, makes conversation. . . (see row details)   10. Dysarthria 0-->Normal   11. Extinction and Inattention  0-->No abnormality   Total 3 (11/02/19 7002)       Imaging  I personally reviewed all imaging; relevant findings per HPI.     CTP head: IMPRESSION: Normal CT perfusion of the  brain.    CTA head/neck:  IMPRESSION: Minimal atherosclerotic plaque at the origin of the left  internal carotid artery that does not result in any vascular  narrowing. Moderate atherosclerotic narrowing at the origin of the  dominant right vertebral artery. Otherwise, normal neck and head CTA.  No evidence for vascular occlusion.    CT head:  IMPRESSION:  Normal head CT.     Lab Results Data   CBC  Recent Labs   Lab 11/02/19  1406   WBC 6.9   RBC 5.41*   HGB 16.6*   HCT 47.2*        Basic Metabolic Panel    Recent Labs   Lab 11/02/19  1406      POTASSIUM 3.6   CHLORIDE 106   CO2 23   BUN 19   CR 0.65   *   APRIL 9.6     Liver Panel  Recent Labs   Lab Test 02/06/18  0932 04/27/16  0928 11/18/13  0918   PROTTOTAL 7.4 7.3 7.4   ALBUMIN 4.1 4.0 4.1   BILITOTAL 0.4 0.2 0.2   ALKPHOS 120 107 128   AST 21 18 21   ALT 27 29 37     INR  Recent Labs   Lab Test 11/02/19  1406   INR 0.88      Lipid Profile  Recent Labs   Lab Test 02/06/18  0932 04/27/16  0928 11/18/13  0918 10/28/13  1121   CHOL 324* 261* 318* 332*   HDL 47* 45* 43* 43*   * 186* 239* Cannot estimate LDL when triglyceride exceeds 400 mg/dL   TRIG 237* 149 178* 431*   CHOLHDLRATIO  --   --  7.4* 7.8*     A1CNo lab results found.  Troponin Aly results for input(s): TROPI in the last 168 hours.       Stroke Code / Stroke Consult Data Data   Stroke Code Data  (for stroke code without tele)  Stroke code activated 11/02/19   1400   First stroke provider response 11/02/19   1402   Last known normal 11/02/19   1200   Time of discovery   (or onset of symptoms) 11/02/19   1230   Head CT read by me 11/02/19   1416   Was stroke code de-escalated? Yes 11/02/19 1453  other (see comments) rapid improvement       I have personally spent a total of 35 minutes providing critical care services supervising this patient's stroke code activation.  I personally reviewed all lab values and radiology images.  I evaluated and directed care for the patient's  critical condition.

## 2019-11-02 NOTE — PHARMACY-ADMISSION MEDICATION HISTORY
Pharmacy Medication History  Admission medication history interview status for the 11/2/2019  admission is complete. See EPIC admission navigator for prior to admission medications     Medication history sources: Patient and spouse.  Medication history source reliability: Moderate  Adherence assessment:  Patient states that she does not regularly take her vitamins.    Significant changes made to the medication list:  None    Medication reconciliation completed by provider prior to medication history? No    Time spent in this activity: 15 minutes      Prior to Admission medications    Medication Sig Last Dose Taking? Auth Provider   albuterol (ALBUTEROL) 108 (90 BASE) MCG/ACT inhaler Inhale 1-2 puffs into the lungs every 6 hours as needed for shortness of breath / dyspnea  at prn Yes Lucia Lind MD   co-enzyme Q-10 100 MG CAPS capsule Take 100 mg by mouth daily Past Week at Unknown time Yes Unknown, Entered By History   diphenhydrAMINE (BENADRYL) 25 MG tablet Take 2 tablets (50 mg) by mouth every 6 hours as needed for itching or allergies  at prn Yes Leola Johnson MD   Glucos-MSM-C-Zq-Wlocic-Eyodpb (GLUCOSAMINE MSM COMPLEX) TABS tablet Take 1 tablet by mouth daily Past Week at Unknown time Yes Unknown, Entered By History   Multiple Vitamins-Minerals (OCUVITE PRESERVISION PO) Take 1 capsule by mouth daily Past Week at Unknown time Yes Unknown, Entered By History   Omega-3 Fatty Acids (FISH OIL OMEGA-3 PO) Take 1 capsule by mouth daily Past Week at Unknown time Yes Unknown, Entered By History     Melva WeldonD, BCPS

## 2019-11-02 NOTE — H&P
[facility]    History and Physical  Hospitalist       Date of Admission:  11/2/2019  Date of Service (when I saw the patient): 11/02/19    Assessment & Plan   Kalani Rowan is a 69 year old female who presents with expressive aphasia, headache    CVA  Fairly unremarkable medical history. On day of presentation 11 am normal, by 12:15 with difficulty getting words out. At home SBP was 190 systolic. In ED -190 systolic, HR ok, remainder of vitals stable. Exam unremarkable. BMP, CBC normal. CT head without bleed, perfusion study negative/ normal, CTA with min-mod disease noted. No evidence for vascular occlusion. EKG with NSR  - q4 hour vitals and neuro checks  - telemetry  - permissive hypertension SBP>180  - troponins  - A1C, lipids, TSH  - start atorvastatin  - NPO pending swallow eval  - MRI  - echocardiogram  - neurology consult  - PT/ OT/ SLP    LOC history  Pt/  relate h/o LOC, last many years ago. They state they've had cardiac and neuro workup, no recent events  - monitor as above with tele/ echo    DVT Prophylaxis: Pneumatic Compression Devices  Code Status: Full Code    Disposition: Expected discharge in ~2 days pending CVA evaluation    Tejinder Ovalles MD  [phone] (P)  Text Page     Primary Care Physician   Dr. Lucia Lind    Chief Complaint   Expressive aphasia    History is obtained from the patient and medical records    History of Present Illness   Kalani Rowan is a 69 year old female who presents with expressive aphasia.  She has a fairly unremarkable past medical history.  Day of presentation she was doing well at approximately 11 AM.  She was at the store and upon returning she started to develop headache.  She also reports some double vision and nausea.  At approximately 15 her family member started noticing that she was having trouble finding words.  This prompted her presentation emergency department.  During her evaluation for likely CVA her symptoms  "largely resolved.  At the time my visit she appears to have some word finding difficulties.  She states her headache is better.  She also reports some left arm weakness.  Denies any chest pain or shortness of breath.  Patient is currently okay.  No problems swallowing.    Past Medical History    I have reviewed this patient's medical history and updated it with pertinent information if needed.   Past Medical History:   Diagnosis Date     Allergy, unspecified not elsewhere classified        Past Surgical History   I have reviewed this patient's surgical history and updated it with pertinent information if needed.  Past Surgical History:   Procedure Laterality Date     C NONSPECIFIC PROCEDURE      S/P Right Tracheotomy Age 6 in [place] \"choking on nuts\"     C NONSPECIFIC PROCEDURE      Left Foot Cyst       Prior to Admission Medications   Prior to Admission Medications   Prescriptions Last Dose Informant Patient Reported? Taking?   ALLEGRA 180 MG OR TABS   No No   Sig: TAKE ONE DAILY   EPINEPHrine (EPIPEN) 0.3 MG/0.3ML injection   No No   Sig: Inject 0.3 mLs (0.3 mg) into the muscle once as needed for anaphylaxis   Patient not taking: Reported on 2/6/2018   NASONEX 50 MCG/ACT NA SUSP   No No   Sig: INHALE 2 SPRAYS IN EACH NOSTRIL ONE DAILY   Patient not taking: No sig reported   Omega-3 Fatty Acids (FISH OIL) 1200 MG capsule   Yes No   Sig: Take 1,400 mg by mouth daily   albuterol (ALBUTEROL) 108 (90 BASE) MCG/ACT inhaler   No No   Sig: Inhale 1-2 puffs into the lungs every 6 hours as needed for shortness of breath / dyspnea   Patient not taking: Reported on 2/6/2018   diphenhydrAMINE (BENADRYL) 25 MG tablet   No No   Sig: Take 2 tablets (50 mg) by mouth every 6 hours as needed for itching or allergies      Facility-Administered Medications: None     Allergies   Allergies   Allergen Reactions     Codeine      Elderberry [Sambucus Canadensis]      Elderberry syrup- ER.        Social History   I have reviewed this " patient's social history and updated it with pertinent information if needed. Kalani Rowan  reports that she has never smoked. She has never used smokeless tobacco. She reports that she does not drink alcohol.    Family History   I have reviewed this patient's family history and updated it with pertinent information if needed.   Family History   Problem Relation Age of Onset     Colon Cancer Mother      Other Cancer Father         skin     Heart Failure Father        Review of Systems   The 10 point Review of Systems is negative other than noted in the HPI or here.     Physical Exam   Temp: 98.1  F (36.7  C) Temp src: Oral BP: (!) 163/93 Pulse: 80 Heart Rate: 83 Resp: 20 SpO2: 98 % O2 Device: None (Room air)    Vital Signs with Ranges  140 lbs 0 oz    Constitutional: alert, oriented and in no acute distress  Eyes: EOMI, PERRL  HEENT: OP clear  Respiratory: CTA B without w/c  Cardiovascular: RRR without m/r/g  GI: soft, nontender, nondistended, no HSM  Lymph/Hematologic: no cervical LAD  Genitourinary: deferred  Skin: no rashes or lesions grossly  Musculoskeletal: no deformities or arthritis  Neurologic: CN II-XII, BARRERA, sensation grossly intact  Psychiatric: mood and affect wnl    Data   Data reviewed today:  I personally reviewed the EKG tracing showing NSR and the head CT image(s) showing no bleed.  Recent Labs   Lab 11/02/19  1406   WBC 6.9   HGB 16.6*   MCV 87      INR 0.88      POTASSIUM 3.6   CHLORIDE 106   CO2 23   BUN 19   CR 0.65   ANIONGAP 8   APRIL 9.6   *       Recent Results (from the past 24 hour(s))   CT Head w/o Contrast    Narrative    CT OF THE HEAD WITHOUT CONTRAST 11/2/2019 2:16 PM     COMPARISON: None.    HISTORY:  Altered mental status. Word finding difficulties.    TECHNIQUE: Axial CT images of the head from the skull base to the  vertex were acquired without IV contrast.    FINDINGS:  The ventricles and basal cisterns are within normal limits  in configuration. There is no  midline shift. There are no extra-axial  fluid collections.  Gray-white differentiation is well maintained.     No intracranial hemorrhage, mass or recent infarct.    The visualized paranasal sinuses are well-aerated. There is no  mastoiditis. There are no fractures of the visualized bones.       Impression    IMPRESSION:  Normal head CT.       Radiation dose for this scan was reduced using automated exposure  control, adjustment of the mA and/or kV according to patient size, or  iterative reconstruction technique.    ABDULKADIR BRITO MD   CTA Head Neck with Contrast    Narrative    CT ANGIOGRAM OF THE HEAD AND NECK WITHOUT AND WITH CONTRAST  11/2/2019  2:19 PM     COMPARISON: None    HISTORY: Word finding difficulty.    TECHNIQUE:  Precontrast localizing scans were followed by CT  angiography with an injection of 70 mL Isovue-370   nonionic  intravenous contrast material with scans through the head and neck.   Images were transferred to a separate 3-D workstation where  multiplanar reformations and 3-D images were created.  Estimates of  carotid stenoses are made relative to the distal internal carotid  artery diameters except as noted.      FINDINGS:   Neck CTA: The common carotid arteries bilaterally are patent without  stenosis. There is calcified atherosclerotic plaque at the origin of  the left internal carotid artery that does not result in any  significant vascular narrowing. The cervical internal carotid arteries  bilaterally are otherwise patent and unremarkable. There is moderate  atherosclerotic narrowing at the origin of the dominant right  vertebral artery. The right vertebral artery is otherwise patent and  unremarkable. The slightly smaller left vertebral artery is patent and  unremarkable.    Head CTA: The basilar, bilateral intracranial distal internal carotid,  bilateral anterior cerebral, bilateral middle cerebral and bilateral  posterior cerebral arteries are patent without vascular  narrowing,  occlusion or cerebral artery aneurysm. The posterior communicating  artery on the right is patent.      Impression    IMPRESSION: Minimal atherosclerotic plaque at the origin of the left  internal carotid artery that does not result in any vascular  narrowing. Moderate atherosclerotic narrowing at the origin of the  dominant right vertebral artery. Otherwise, normal neck and head CTA.  No evidence for vascular occlusion.      Radiation dose for this scan was reduced using automated exposure  control, adjustment of the mA and/or kV according to patient size, or  iterative reconstruction technique.    ABDULKADIR BRITO MD   CT Head Perfusion w Contrast    Narrative    CT BRAIN PERFUSION 11/2/2019 2:26 PM    COMPARISON: None    HISTORY: Word finding difficulty    TECHNIQUE: Time sequential axial CT images of the head were acquired  during the administration of intravenous contrast (50 mL Isovue-370     ). CTA images of the [place] as well as color perfusion maps  of the brain were created from this time sequential axial source data.    FINDINGS: There are no focal or regional perfusion defects in the  brain.      Impression    IMPRESSION: Normal CT perfusion of the brain.      Radiation dose for this scan was reduced using automated exposure  control, adjustment of the mA and/or kV according to patient size, or  iterative reconstruction technique    ABDULKADIR BRITO MD

## 2019-11-02 NOTE — ED NOTES
Patient returned from CT, tpA was ordered to prep per Neuro Roz Barriga in CT, after returning patient started to improve greatly, Tele-Neuro was consulted for possible tPA, with rapid improvement of aphasia MDs made the decision to cancel tPA.  Family is at the BS and stated that she is much better.  Patient also stated that she is doing much better.      Deescalated Code stroke and called Pharm-tPA-cancelled 1443, spoke to Danika Barriga stated do not treat BP till greater than 220

## 2019-11-02 NOTE — ED NOTES
"[facility]  ED Nurse Handoff Report    ED Chief complaint: Headache      ED Diagnosis:   Final diagnoses:   Cerebrovascular accident (CVA), unspecified mechanism (H)       Code Status: Full Code    Allergies:   Allergies   Allergen Reactions     Codeine      Elderberry [Sambucus Canadensis]      Elderberry syrup- ER.        Activity level - Baseline/Home:  Independent  Activity Level - Current:   Independent    Patient's Preferred language: English   Needed?: No    Isolation: No  Infection: Not Applicable  Bariatric?: No    Vital Signs:   Vitals:    11/02/19 1425 11/02/19 1430 11/02/19 1445 11/02/19 1500   BP: (!) 162/88 (!) 173/89 (!) 173/88 (!) 163/93   Pulse: 92 89  80   Resp: 16 (!) 32 (!) 37 20   Temp:       TempSrc:       SpO2: 95% 98% 98%    Weight:       Height:           Cardiac Rhythm: ,        Pain level:      Is this patient confused?: No   Does this patient have a guardian?  No         If yes, is there guardianship documents in the Epic \"Code/ACP\" activity?  N/A         Guardian Notified?  N/A  [place] - Suicide Severity Rating Scale Completed?  Yes  If yes, what color did the patient score?  N/A    Patient Report: Initial Complaint: Headache and funny speach  Focused Assessment: patient comes in with stroke s/s of aphasia no focal deficiets, per family at 1245 today she started to act funny and her speech seemed off.  Code stroke was called, ct completed with profusion study.  Negative at the time and s/s of aphasia has resolved as of 1440 and deescalated at 1443.  Family has been at the bedside, VSS patient has been up to the BD commode with assist of RN, and passed swallow evalation, took tyl and ASA, one liter fluid bolus completed.  Tests Performed: CT  Abnormal Results:   Labs Ordered and Resulted from Time of ED Arrival Up to the Time of Departure from the ED   BASIC METABOLIC PANEL - Abnormal; Notable for the following components:       Result Value    Glucose 107 " (*)     All other components within normal limits   CBC WITH PLATELETS DIFFERENTIAL - Abnormal; Notable for the following components:    RBC Count 5.41 (*)     Hemoglobin 16.6 (*)     Hematocrit 47.2 (*)     All other components within normal limits   INR   PARTIAL THROMBOPLASTIN TIME       Treatments provided: Monitored and pain medications for HA    Family Comments: at the bedside,  and children    OBS brochure/video discussed/provided to patient/family: No              Name of person given brochure if not patient: na              Relationship to patient: na    ED Medications:   Medications   aspirin (ASA) chewable tablet 324 mg (has no administration in time range)   acetaminophen (TYLENOL) tablet 1,000 mg (has no administration in time range)   iopamidol (ISOVUE-370) solution 120 mL (120 mLs Intravenous Given 11/2/19 1419)   sodium chloride 0.9 % bag 500mL for CT scan flush use (100 mLs Intravenous Given 11/2/19 1419)       Drips infusing?:  No    For the majority of the shift this patient was Green.   Interventions performed were NA.    Severe Sepsis OR Septic Shock Diagnosis Present: No    To be done/followed up on inpatient unit:  NA    ED NURSE PHONE NUMBER: Rita Barajas RN

## 2019-11-02 NOTE — PROGRESS NOTES
RECEIVING UNIT ED HANDOFF REVIEW    ED Nurse Handoff Report was reviewed by: Nisa Curtis RN on November 2, 2019 at 3:53 PM

## 2019-11-02 NOTE — PROVIDER NOTIFICATION
"Paged DONELL Pratt, \"pt reported feeling of \"passing out\". She  reported feeling hot. Improved with HOB flat & legs elevated & cold packs applied. No neuro changes. HR low 100s. BP 160s/90s. No SOB. Mild HA. Please advise. \"    Notified that .    Per Dejah, continue to monitor for now   "

## 2019-11-02 NOTE — ED PROVIDER NOTES
History     Chief Complaint:  Stroke symptoms    HPI   Kalani Rowan is a 69 year old female with a history of hyperlipidemia and hypercholesteremia who presents with stroke symptoms.  The patient presents with family who provides the history.  She was last seen normal at 11 AM this.  Around 1215 the patient's family states that they were trying to talk to her but she was not answering appropriately and able to get her words out.  The patient reports a frontal headache.  Is unclear when this headache started.  The patient has no history of stroke.  She is not on any anticoagulation.  She denies any chest pain, shortness of breath, or numbness.  Patient does not take any aspirin, does not smoke, does not drink alcohol.  Of note the patient's family did check her blood pressure at home and it was very high apparently 190 systolic.    Allergies:  Codeine    Medications:      albuterol   allegra  diphenhydranime  epinephrine    Past Medical History:    Hyperlipidemia   Hypercholesteremia   PPD positive  Mild intermittent asthma  Esophageal reflux    Past Surgical History:    Right tracheotomy  Left foot cyst surgery    Family History:    Colon cancer - mother  Skin cancer - father  Heart failure - father    Social History:  Smoking status: never smoker  Alcohol use: No  The patient presents to the emergency department with her family.  PCP: Lucia Lind  Marital Status:       Review of Systems   Respiratory: Negative for shortness of breath.    Cardiovascular: Negative for chest pain.   Neurological: Positive for headaches. Negative for numbness.   All other systems reviewed and are negative.    Physical Exam     Patient Vitals for the past 24 hrs:   BP Temp Temp src Pulse Heart Rate Resp SpO2 Height Weight   11/02/19 1500 (!) 163/93 -- -- 80 83 20 -- -- --   11/02/19 1445 (!) 173/88 -- -- -- 96 (!) 37 98 % -- --   11/02/19 1430 (!) 173/89 -- -- 89 91 (!) 32 98 % -- --   11/02/19 1425 (!) 162/88 -- -- 92  "-- 16 95 % -- --   11/02/19 1423 (!) 162/83 -- -- -- 90 20 98 % -- --   11/02/19 1412 (!) 191/93 -- -- -- 90 15 100 % -- --   11/02/19 1346 (!) 195/84 98.1  F (36.7  C) Oral -- 95 12 99 % 1.626 m (5' 4\") 63.5 kg (140 lb)       Physical Exam    Physical Exam   Constitutional:  Patient is oriented to person, place, and time. They appear well-developed and well-nourished. Mild distress secondary to CVA   HENT:   Mouth/Throat:   Oropharynx is clear and moist.   Eyes:    Conjunctivae normal and EOM are normal. Pupils are equal, round, and reactive to light.   Neck:    Normal range of motion.   Cardiovascular: Normal rate, regular rhythm and normal heart sounds.  Exam reveals no gallop and no friction rub.  No murmur heard.  Pulmonary/Chest:  Effort normal and breath sounds normal. Patient has no wheezes. Patient has no rales.   Abdominal:   Soft. Bowel sounds are normal. Patient exhibits no mass. There is no tenderness. There is no rebound and no guarding.   Musculoskeletal:  Normal range of motion. Patient exhibits no edema.   Neurological:   Patient is alert. See NIH stroke scale.  Skin:   Skin is warm and dry. No rash noted. No erythema.   Psychiatric:    Unable to assess due to stroke symptoms.      Emergency Department Course   ECG (14:43:12):  Rate 76 bpm. AL interval 174. QRS duration 82. QT/QTc 404/454. P-R-T axes 76 -5 22. Normal sinus rhythm. Normal ECG. Interpreted at 1445 by Angeli Morin MD.      Imaging:  Radiographic findings were communicated with the patient who voiced understanding of the findings.  CT Head Perfusion w contrast:   IMPRESSION: Normal CT perfusion of the brain. as per radiology.    CTA Head/Neck with contrast:   IMPRESSION: Minimal atherosclerotic plaque at the origin of the left internal carotid artery that does not result in any vascular  narrowing. Moderate atherosclerotic narrowing at the origin of the  dominant right vertebral artery. Otherwise, normal neck and head CTA.  No " evidence for vascular occlusion. as per radiology.    CT Head without contrast:   IMPRESSION:  Normal head CT. as per radiology.      Laboratory:  CBC: WBC: 6.9, HGB: 16.6, PLT: 286  BMP: Glucose 107 (H), o/w WNL (Creatinine: 0.65)  INR: 0.88  Partial thromboplastin time: 27    Emergency Department Course:  Nursing notes and vitals reviewed. (1351) I performed an exam of the patient as documented above.     IV inserted. Medicine administered as documented above. Blood drawn. This was sent to the lab for further testing, results above.     The patient was sent for a head perfusion CT, head/neck CTA, and head CT scan while in the emergency department, findings above.     An EKG was obtained. Findings are described above.     1358 paged  stable 3 for code stroke patient    1359 performed exam and history on the patient with the assistance of the patient's family.  The patient is a native [place] speaker but is reportedly fluent in English.  Code stroke was called.    1403 spoke with Dr. Chris of the [facility] [place].    1405 spoke with the PA of the [facility] [place] team.  She was down here and examined the patient prior to going to CT.     1405 Patient left for CT scan    1424 Patient returns from CT scan    1432 I reassessed the patient at that time. The neurology PA was present and teledec with Dr. Chris was started.    1433 Repeat neurology exam performed. Findings are described below. Also at this time TPA was discussed with the family. I spoke with Dr. Jansen of neuroradiology regarding the CAT scan findings. They all appear to be normal    1438 TPA was decided not to be given due to rapid improvement    1447   I consulted with Dr. Ovalles of the hospitalist services. They are in agreement to accept the patient for admission.    Findings and plan explained to the Patient who consents to admission. Discussed the patient with Dr. Ovalles, who will admit the patient to a neuro bed for further  monitoring, evaluation, and treatment.    ED INTERVENTIONS  1528 Tylenol 1000 mg PO            Aspirin 324 PO (after swallow study)  1533 1 L ns    Impression & Plan      CMS Diagnoses: The patient has stroke symptoms:           ED Stroke specific documentation           NIHSS PDF          Protocol PDF     Patient last known well time: 1100  ED Provider first to bedside at: 1359  CT Results received at: 1433    tPA:   Not given due to minor / isolated / quickly resolving stroke symptoms.    If treating with tPA: Ensure SBP<185 and DBP<105 prior to treatment with IV tPA.  Administering IV tPA after treatment with IV labetalol, hydralazine, or nicardipine is reasonable once BP control is established.    Endovascular Retrieval:  Not initiated due to absence of proximal vessel occlusion    National Institutes of Health Stroke Scale (Baseline)  Time Performed: 1359      Score    Level of consciousness: (0)   Alert, keenly responsive    LOC questions: (0)   Answers both questions correctly    LOC commands: (0)   Performs both tasks correctly    Best gaze: (0)   Normal    Visual: (0)   No visual loss    Facial palsy: (0)   Normal symmetrical movements    Motor arm (left): (0)   No drift    Motor arm (right): (0)   No drift    Motor leg (left): (0)   No drift    Motor leg (right): (0)   No drift    Limb ataxia: (0)   Absent    Sensory: (0)   Normal- no sensory loss    Best language: (2)   Severe aphasia    Dysarthria: (0)   Normal    Extinction and inattention: (0)   No abnormality        Total Score:  2        Stroke Mimics were considered (including migraine headache, seizure disorder, hypoglycemia (or hyperglycemia), head or spinal trauma, CNS infection, Toxin ingestion and shock state (e.g. sepsis) .        National Institutes of Health Stroke Scale  Time Performed: 1432      Score    Level of consciousness: (0)   Alert, keenly responsive    LOC questions: (0)   Answers both questions correctly    LOC commands: (0)    Performs both tasks correctly    Best gaze: (0)   Normal    Visual: (0)   No visual loss    Facial palsy: (0)   Normal symmetrical movements    Motor arm (left): (0)   No drift    Motor arm (right): (0)   No drift    Motor leg (left): (0)   No drift    Motor leg (right): (0)   No drift    Limb ataxia: (0)   Absent    Sensory: (0)   Normal- no sensory loss    Best language: (0)   Normal- no aphasia    Dysarthria: (0)   Normal    Extinction and inattention: (0)   No abnormality        Total Score:  0        Medical Decision Making:  Kalani Rowan is a 69-year-old female presenting with stroke symptoms.  She was last seen normal at 11 AM.  When she presented she had significant expressive aphasia.  She was within the window for TPA.  Code stroke was called and she was sent to CAT scan forth with.  I spoke with Dr. Moe, of stroke neurology, Roz the PA came down to the emergency room to assess the patient.  The patient's NIH stroke scale on arrival was 2.  When she came back from CAT scan she seemed to be quickly resolving her for symptoms.  Report from Dr. ortiz, radiology, was that there was no acute stroke, bleed, mass, aneurysm.  She was significantly hypertensive on arrival, this is gradually improved.    Dr. Moe performed a telemetry stroke exam.  After discussion with the family it was determined that the patient was essentially back to her baseline.  Due to her quickly resolving symptoms she is not a candidate for TPA.  Basic blood work was performed as well as ECG findings as noted above.  A bedside swallow study was performed.  She was then given aspirin and Tylenol orally.  At this time it is recommended that we admit the patient for further work-up.  I will admit to Dr. Ovalles.  Neuro will continue to be involved in her care.      Critical Care time:  45 minutes    Diagnosis:    ICD-10-CM    1. Cerebrovascular accident (CVA), unspecified mechanism (H) I63.9        Disposition:  Admitted to  neuro  Yoel Austin  11/2/2019    EMERGENCY DEPARTMENT  Scribe Disclosure:  I, Yoel Austin, am serving as a scribe at 1:59 PM on 11/2/2019 to document services personally performed by Angeli Morin MD based on my observations and the provider's statements to me.        Angeli Morin MD  11/02/19 1552

## 2019-11-03 ENCOUNTER — APPOINTMENT (OUTPATIENT)
Dept: OCCUPATIONAL THERAPY | Facility: CLINIC | Age: 69
DRG: 305 | End: 2019-11-03
Attending: INTERNAL MEDICINE
Payer: COMMERCIAL

## 2019-11-03 ENCOUNTER — APPOINTMENT (OUTPATIENT)
Dept: CARDIOLOGY | Facility: CLINIC | Age: 69
DRG: 305 | End: 2019-11-03
Attending: INTERNAL MEDICINE
Payer: COMMERCIAL

## 2019-11-03 VITALS
DIASTOLIC BLOOD PRESSURE: 76 MMHG | WEIGHT: 140 LBS | HEIGHT: 64 IN | BODY MASS INDEX: 23.9 KG/M2 | RESPIRATION RATE: 16 BRPM | HEART RATE: 98 BPM | TEMPERATURE: 97.8 F | SYSTOLIC BLOOD PRESSURE: 137 MMHG | OXYGEN SATURATION: 97 %

## 2019-11-03 LAB — TROPONIN I SERPL-MCNC: <0.015 UG/L (ref 0–0.04)

## 2019-11-03 PROCEDURE — 97165 OT EVAL LOW COMPLEX 30 MIN: CPT | Mod: GO | Performed by: OCCUPATIONAL THERAPIST

## 2019-11-03 PROCEDURE — 40000893 ZZH STATISTIC PT IP EVAL DEFER

## 2019-11-03 PROCEDURE — 99232 SBSQ HOSP IP/OBS MODERATE 35: CPT | Performed by: PSYCHIATRY & NEUROLOGY

## 2019-11-03 PROCEDURE — 99238 HOSP IP/OBS DSCHRG MGMT 30/<: CPT | Performed by: INTERNAL MEDICINE

## 2019-11-03 PROCEDURE — 93306 TTE W/DOPPLER COMPLETE: CPT | Mod: 26 | Performed by: INTERNAL MEDICINE

## 2019-11-03 PROCEDURE — 40000264 ECHOCARDIOGRAM COMPLETE

## 2019-11-03 PROCEDURE — 36415 COLL VENOUS BLD VENIPUNCTURE: CPT | Performed by: INTERNAL MEDICINE

## 2019-11-03 PROCEDURE — 25800030 ZZH RX IP 258 OP 636: Performed by: INTERNAL MEDICINE

## 2019-11-03 PROCEDURE — 84484 ASSAY OF TROPONIN QUANT: CPT | Performed by: INTERNAL MEDICINE

## 2019-11-03 RX ORDER — ATORVASTATIN CALCIUM 80 MG/1
80 TABLET, FILM COATED ORAL
Status: DISCONTINUED | OUTPATIENT
Start: 2019-11-03 | End: 2019-11-03 | Stop reason: HOSPADM

## 2019-11-03 RX ADMIN — SODIUM CHLORIDE: 9 INJECTION, SOLUTION INTRAVENOUS at 07:41

## 2019-11-03 ASSESSMENT — ACTIVITIES OF DAILY LIVING (ADL)
PREVIOUS_RESPONSIBILITIES: MEAL PREP;HOUSEKEEPING;LAUNDRY;SHOPPING;MEDICATION MANAGEMENT;FINANCES;DRIVING;WORK
ADLS_ACUITY_SCORE: 11
ADLS_ACUITY_SCORE: 11
IADL_COMMENTS: FAMILY CAN ASSIST
ADLS_ACUITY_SCORE: 11
ADLS_ACUITY_SCORE: 11

## 2019-11-03 NOTE — PROVIDER NOTIFICATION
" Paged hospital ist regarding Patient c/o feeling \"passing out \" again but improved after some times per patient symptoms are coming back and going away    Got call back no new orders continue to monitor  "

## 2019-11-03 NOTE — PROGRESS NOTES
"  [facility]    Stroke Progress Note    Interval Events  Patient reports that she feels back to normal now. She thinks that her speech got better last evening as her blood pressure went down. Today it is in 120s systolic. She no longer has a headache and most recently took tylenol for it several hours ago    Impression  Hypertensive encephalopathy causing transient aphasia and confusion with presenting SBP in 190s - now speech changes resolved. Clinical picture not consistent with TIA because of lack of focality other than speech and confusion, and she had other systemic symptoms of hypertensive encephalopathy including headache and dizziness.     Recommendations  - Tight control of BP as outpatient: recommend pt keep log of BP readings three times a day and take to PCP  - Agree with medical workup of severe HTN including echo which is currently pending  - Recommend aspirin and statin for primary stroke prevention since her LDL and triglycerides are extraordinarily high. She declines statin. Follow up with PCP regarding this.    Patient Follow-Up  - in the next 1 week(s) with PCP    Thank you for this consult.  No further stroke evaluation is recommended, so we will sign off. Please contact us with any additional questions.    Michelle Barriga PA-C  Neurology  11/03/2019 8:26 AM  Text Page (8am-5pm)  To page stroke neurology after hours or on a subsequent day, click here: [facility]  Choose \"On Call\" tab at top, then search dropdown box for \"Neurology Adult\" & press Enter, look for Neuro ICU/Stroke    ______________________________________________________    Medications     Scheduled Meds    atorvastatin  80 mg Oral or NG Tube Daily at 8 pm     sodium chloride (PF)  3 mL Intracatheter Q8H       Infusion Meds    - MEDICATION INSTRUCTIONS -       sodium chloride 75 mL/hr at 11/03/19 0741       PRN Meds  acetaminophen, acetaminophen, bisacodyl, hydrALAZINE, labetalol, lidocaine 4%, lidocaine (buffered or " not buffered), magnesium hydroxide, - MEDICATION INSTRUCTIONS -, melatonin, naloxone, ondansetron **OR** ondansetron, potassium chloride, potassium chloride with lidocaine, potassium chloride, potassium chloride, senna-docusate **OR** senna-docusate, sodium chloride (PF)       PHYSICAL EXAMINATION  Temp:  [98.1  F (36.7  C)-99.3  F (37.4  C)] 98.1  F (36.7  C)  Pulse:  [80-98] 98  Heart Rate:  [76-99] 79  Resp:  [12-37] 16  BP: (125-195)/(72-99) 126/74  SpO2:  [95 %-100 %] 96 %     General:  patient lying in bed without any acute distress    HEENT:  normocephalic/atraumatic  Pulmonary:  no respiratory distress    Neurologic  Mental Status:  alert, oriented x 3, follows commands, speech clear and fluent, naming and repetition normal  Cranial Nerves:  visual fields intact, PERRL, EOMI with normal smooth pursuit, facial movements symmetric, hearing not formally tested but intact to conversation, no dysarthria, tongue protrusion midline  Motor:  normal muscle tone and bulk, no abnormal movements, able to move all limbs spontaneously, strength 5/5 throughout upper and lower extremities, no pronator drift  Sensory:  light touch sensation intact and symmetric throughout upper and lower extremities  Station/Gait:  deferred       Imaging  I personally reviewed all imaging; relevant findings per HPI.     Lab Results Data   CBC  Recent Labs   Lab 11/02/19  1406   WBC 6.9   RBC 5.41*   HGB 16.6*   HCT 47.2*        Basic Metabolic Panel    Recent Labs   Lab 11/02/19  1406      POTASSIUM 3.6   CHLORIDE 106   CO2 23   BUN 19   CR 0.65   *   APRIL 9.6     Liver Panel  Recent Labs   Lab Test 11/02/19  1406 02/06/18  0932 04/27/16  0928   PROTTOTAL 8.8 7.4 7.3   ALBUMIN 4.4 4.1 4.0   BILITOTAL 0.4 0.4 0.2   ALKPHOS 155* 120 107   AST 22 21 18   ALT 36 27 29     INR  Recent Labs   Lab Test 11/02/19  1406   INR 0.88      Lipid Profile  Recent Labs   Lab Test 11/02/19  1406 02/06/18  0932 04/27/16  0928 11/18/13  0918  10/28/13  1121   CHOL 377* 324* 261* 318* 332*   HDL 54 47* 45* 43* 43*   LDL Cannot estimate LDL when triglyceride exceeds 400 mg/dL  247* 230* 186* 239* Cannot estimate LDL when triglyceride exceeds 400 mg/dL   TRIG 547* 237* 149 178* 431*   CHOLHDLRATIO  --   --   --  7.4* 7.8*     A1C  Recent Labs   Lab Test 11/02/19  1406   A1C 5.3     Troponin I  Recent Labs   Lab 11/03/19  0048 11/02/19  1406   TROPI <0.015 <0.015

## 2019-11-03 NOTE — PLAN OF CARE
Pt here with r/o CVA & HTN encephalopathy. A&Ox4. Neuros intact, HA resolved. Tele NSR. Regular diet, thin liquids. Takes pills whole. Up independently. Denies pain. LS clear. Pt scoring green on the Aggression Stop Light Tool. Discharged home via spouse. Discharge instruction reviewed & questions answered. Belongings sent with patient. Pt plans to make follow up appt with PCP.

## 2019-11-03 NOTE — DISCHARGE SUMMARY
[facility]    Discharge Summary  Hospitalist    Date of Admission:  11/2/2019  Date of Discharge:  11/3/2019  Discharging Provider: Tejinder Ovalles MD  Date of Service (when I saw the patient): 11/03/19    Discharge Diagnoses   Expressive aphasia  Hypertensive emergency  Hyperlipidemia    History of Present Illness   Kalani Rowan is a 69 year old female who presents with expressive aphasia, headache    Hospital Course   Kalani Rowan was admitted on 11/2/2019.  The following problems were addressed during her hospitalization:    Expressive aphasia  Hypertensive emergency  Mrs. Rowan presented with expressive aphasia and headache.  Fairly unremarkable past medical history.  Onset of symptoms with subsequent check of blood pressure as high as 190s systolic.  Blood pressure is 1 6190 systolic.  Evaluation was performed including CT angiogram all of which were negative was unremarkable.  She did have some sclerosis left internal carotid and right vertebral artery.  Echocardiogram was normal.  Troponins were negative.  TSH was normal.  Hemoglobin A1c was 5.3%.  Suspicion was for hypertensive encephalopathy.  She is staying, however, and that her blood pressures were 120 systolic by the time of discharge without any medication management.  Requested to check blood pressures 3 times a day at home writing down values.  She should follow-up with her primary with respect to this.    Hyperlipidemia  During her evaluation she was found to 247 and triglycerides 547.  A statin medication was recommended but she declined.  Follow-up with primary.    Tejinder Ovalles M.D.  Hospitalist  Pager [phone]    Significant Results and Procedures   CT angiogram of head and neck  CT head without contrast  MRI brain with and without contrast  CT head perfusion study  Echocardiogram    Pending Results   None    Code Status   Full Code       Primary Care Physician   Lucia Lind    Physical Exam   Temp: 97.8  F  (36.6  C) Temp src: Oral BP: 137/76 Pulse: 98 Heart Rate: 73 Resp: 16 SpO2: 97 % O2 Device: None (Room air)    Vitals:    11/02/19 1346   Weight: 63.5 kg (140 lb)     Vital Signs with Ranges  Temp:  [97.8  F (36.6  C)-99.3  F (37.4  C)] 97.8  F (36.6  C)  Pulse:  [80-98] 98  Heart Rate:  [73-99] 73  Resp:  [12-37] 16  BP: (125-195)/(72-99) 137/76  SpO2:  [95 %-100 %] 97 %  I/O last 3 completed shifts:  In: -   Out: 400 [Urine:400]    Constitutional: Alert, oriented, no acute distress  Respiratory: Lungs clear to auscultation bilaterally, no wheezes, no crackles  Cardiovascular: Regular rate and rhythm, no murmurs  GI: Soft, non-tender, non-disteneded, good bowel sounds  Skin/Integumen: No erythema, cyanosis or edema  Other:      Discharge Disposition   Discharged to home  Condition at discharge: Stable    Consultations This Hospital Stay   NEUROLOGY IP CONSULT  PHYSICAL THERAPY ADULT IP CONSULT  OCCUPATIONAL THERAPY ADULT IP CONSULT  SPEECH LANGUAGE PATH ADULT IP CONSULT  SWALLOW EVAL SPEECH PATH AT BEDSIDE IP CONSULT  PATIENT LEARNING CENTER IP CONSULT    Time Spent on this Encounter   I, Tejinder Ovalles MD, personally saw the patient today and spent less than or equal to 30 minutes discharging this patient.    Discharge Orders      Reason for your hospital stay    You were hospitalized for symptoms concerning for a CVA     Follow-up and recommended labs and tests     Follow up with primary care provider, Lucia Lind, within 7 days for hospital follow- up.  No follow up labs or test are needed.     Activity    Your activity upon discharge: activity as tolerated     Monitor and record    blood pressure 3 times daily, record numbers to give to primary care doctor     When to contact your care team    Return of symptoms     Full Code     Diet    Follow this diet upon discharge: Orders Placed This Encounter      Regular Diet Adult     Discharge Medications   Current Discharge Medication List      CONTINUE  these medications which have NOT CHANGED    Details   albuterol (ALBUTEROL) 108 (90 BASE) MCG/ACT inhaler Inhale 1-2 puffs into the lungs every 6 hours as needed for shortness of breath / dyspnea  Qty: 1 Inhaler, Refills: 12    Associated Diagnoses: Mild intermittent asthma      co-enzyme Q-10 100 MG CAPS capsule Take 100 mg by mouth daily      diphenhydrAMINE (BENADRYL) 25 MG tablet Take 2 tablets (50 mg) by mouth every 6 hours as needed for itching or allergies  Qty: 56 tablet, Refills: 0      Glucos-MSM-C-Wo-Lsbjti-Rngtig (GLUCOSAMINE MSM COMPLEX) TABS tablet Take 1 tablet by mouth daily      Multiple Vitamins-Minerals (OCUVITE PRESERVISION PO) Take 1 capsule by mouth daily      Omega-3 Fatty Acids (FISH OIL OMEGA-3 PO) Take 1 capsule by mouth daily           Allergies   Allergies   Allergen Reactions     Codeine      Elderberry [Sambucus Canadensis]      Elderberry syrup- ER.      Data   Most Recent 3 CBC's:  Recent Labs   Lab Test 11/02/19  1406 02/06/18  0932 04/27/16  0928   WBC 6.9 5.8 5.1   HGB 16.6* 15.0 14.5   MCV 87 89 89    287 260      Most Recent 3 BMP's:  Recent Labs   Lab Test 11/02/19  1406 02/06/18  0932 04/27/16  0928    143 142   POTASSIUM 3.6 3.9 4.2   CHLORIDE 106 108 111*   CO2 23 24 21   BUN 19 16 14   CR 0.65 0.72 0.68   ANIONGAP 8 11 10   APRIL 9.6 9.5 9.2   * 87 102*     Most Recent 2 LFT's:  Recent Labs   Lab Test 11/02/19  1406 02/06/18  0932   AST 22 21   ALT 36 27   ALKPHOS 155* 120   BILITOTAL 0.4 0.4     Most Recent INR's and Anticoagulation Dosing History:  Anticoagulation Dose History     Recent Dosing and Labs Latest Ref Rng & Units 11/2/2019    INR 0.86 - 1.14 0.88        Most Recent 3 Troponin's:  Recent Labs   Lab Test 11/03/19  0048 11/02/19  1406   TROPI <0.015 <0.015     Most Recent Cholesterol Panel:  Recent Labs   Lab Test 11/02/19  1406   CHOL 377*   LDL Cannot estimate LDL when triglyceride exceeds 400 mg/dL  247*   HDL 54   TRIG 547*     Most Recent  6 Bacteria Isolates From Any Culture (See EPIC Reports for Culture Details):No lab results found.  Most Recent TSH, T4 and A1c Labs:  Recent Labs   Lab Test 11/02/19  1406   TSH 3.94   A1C 5.3     Results for orders placed or performed during the hospital encounter of 11/02/19   CT Head w/o Contrast    Narrative    CT OF THE HEAD WITHOUT CONTRAST 11/2/2019 2:16 PM     COMPARISON: None.    HISTORY:  Altered mental status. Word finding difficulties.    TECHNIQUE: Axial CT images of the head from the skull base to the  vertex were acquired without IV contrast.    FINDINGS:  The ventricles and basal cisterns are within normal limits  in configuration. There is no midline shift. There are no extra-axial  fluid collections.  Gray-white differentiation is well maintained.     No intracranial hemorrhage, mass or recent infarct.    The visualized paranasal sinuses are well-aerated. There is no  mastoiditis. There are no fractures of the visualized bones.       Impression    IMPRESSION:  Normal head CT.       Radiation dose for this scan was reduced using automated exposure  control, adjustment of the mA and/or kV according to patient size, or  iterative reconstruction technique.    ABDULKADIR BRITO MD   CTA Head Neck with Contrast    Narrative    CT ANGIOGRAM OF THE HEAD AND NECK WITHOUT AND WITH CONTRAST  11/2/2019  2:19 PM     COMPARISON: None    HISTORY: Word finding difficulty.    TECHNIQUE:  Precontrast localizing scans were followed by CT  angiography with an injection of 70 mL Isovue-370   nonionic  intravenous contrast material with scans through the head and neck.   Images were transferred to a separate 3-D workstation where  multiplanar reformations and 3-D images were created.  Estimates of  carotid stenoses are made relative to the distal internal carotid  artery diameters except as noted.      FINDINGS:   Neck CTA: The common carotid arteries bilaterally are patent without  stenosis. There is calcified  atherosclerotic plaque at the origin of  the left internal carotid artery that does not result in any  significant vascular narrowing. The cervical internal carotid arteries  bilaterally are otherwise patent and unremarkable. There is moderate  atherosclerotic narrowing at the origin of the dominant right  vertebral artery. The right vertebral artery is otherwise patent and  unremarkable. The slightly smaller left vertebral artery is patent and  unremarkable.    Head CTA: The basilar, bilateral intracranial distal internal carotid,  bilateral anterior cerebral, bilateral middle cerebral and bilateral  posterior cerebral arteries are patent without vascular narrowing,  occlusion or cerebral artery aneurysm. The posterior communicating  artery on the right is patent.      Impression    IMPRESSION: Minimal atherosclerotic plaque at the origin of the left  internal carotid artery that does not result in any vascular  narrowing. Moderate atherosclerotic narrowing at the origin of the  dominant right vertebral artery. Otherwise, normal neck and head CTA.  No evidence for vascular occlusion.      Radiation dose for this scan was reduced using automated exposure  control, adjustment of the mA and/or kV according to patient size, or  iterative reconstruction technique.    ABDULKADIR BRITO MD   CT Head Perfusion w Contrast    Narrative    CT BRAIN PERFUSION 11/2/2019 2:26 PM    COMPARISON: None    HISTORY: Word finding difficulty    TECHNIQUE: Time sequential axial CT images of the head were acquired  during the administration of intravenous contrast (50 mL Isovue-370     ). CTA images of the [place] as well as color perfusion maps  of the brain were created from this time sequential axial source data.    FINDINGS: There are no focal or regional perfusion defects in the  brain.      Impression    IMPRESSION: Normal CT perfusion of the brain.      Radiation dose for this scan was reduced using automated  exposure  control, adjustment of the mA and/or kV according to patient size, or  iterative reconstruction technique    ABDULKADIR BRITO MD   MRI Brain w & w/o contrast    Narrative    EXAM: MR BRAIN W/O and W CONTRAST  LOCATION: [facility]  DATE/TIME: 2019 10:13 PM    INDICATION: Focal neuro deficit, > 6 hrs, stroke suspected  COMPARISON: None.  CONTRAST: 5 mL Gadavist  TECHNIQUE: Routine multiplanar multisequence head MRI without and with intravenous contrast.    FINDINGS:  INTRACRANIAL CONTENTS: No acute or subacute infarct. No mass, acute hemorrhage, or extra-axial fluid collections. Normal brain parenchymal signal. Normal ventricles and sulci. Normal position of the cerebellar tonsils. No pathologic contrast enhancement.    SELLA: No abnormality accounting for technique.    OSSEOUS STRUCTURES/SOFT TISSUES: Normal marrow signal. The major intracranial vascular flow voids are maintained.     ORBITS: No abnormality accounting for technique.     SINUSES/MASTOIDS: No paranasal sinus mucosal disease. No middle ear or mastoid effusion.       Impression    IMPRESSION:  1.  Normal head MRI.       Echocardiogram Complete - Bubble study    Narrative    [id number]  [id number]  [id number]^FATMATA^ANTOINETTE^MIGUEL ANGEL           [facility]  Echocardiography Laboratory  [address] [place] [address]        Name: KATERIN LUNA  MRN: [id number]  : 1950  Study Date: 2019 09:30 AM  Age: 69 yrs  Gender: Female  Patient Location: [facility]  Reason For Study: CVA  Ordering Physician: ANTOINETTE AVILEZ  Referring Physician: Lucia Lind  Performed By: Ramona Jade RDCS     BSA: 1.7 m2  Height: 64 in  Weight: 140 lb  HR: 76  BP: 126/74 mmHg  _____________________________________________________________________________  __        Procedure  Complete Portable Echo Adult.  _____________________________________________________________________________  __        Interpretation  Summary     1. The left ventricle is normal in structure, function and size. The visual  ejection fraction is estimated at 65%.  2. The right ventricle is normal in structure, function and size.  3. There is no atrial shunt seen. A contrast injection (Bubble Study) was  performed that was negative for flow across the interatrial septum.  4. No valve disease.     No previous echo for comparison.  _____________________________________________________________________________  __        Left Ventricle  The left ventricle is normal in structure, function and size. There is normal  left ventricular wall thickness. The visual ejection fraction is estimated at  65%. Left ventricular diastolic function is normal. Normal left ventricular  wall motion.     Right Ventricle  The right ventricle is normal in structure, function and size.     Atria  Normal left atrial size. Right atrial size is normal. There is no atrial shunt  seen. A contrast injection (Bubble Study) was performed that was negative for  flow across the interatrial septum.     Mitral Valve  The mitral valve is normal in structure and function. There is no mitral  regurgitation noted.        Tricuspid Valve  The tricuspid valve is normal in structure and function. No tricuspid  regurgitation.     Aortic Valve  The aortic valve is normal in structure and function.     Pulmonic Valve  The pulmonic valve is normal in structure and function.     Vessels  Normal ascending, transverse (arch), and descending aorta. The inferior vena  cava was normal in size with preserved respiratory variability.     Pericardium  There is no pericardial effusion.        Rhythm  Sinus rhythm was noted.  _____________________________________________________________________________  __  MMode/2D Measurements & Calculations  IVSd: 1.1 cm     LVIDd: 4.1 cm  LVIDs: 1.9 cm  LVPWd: 0.90 cm  FS: 52.9 %  LV mass(C)d: 132.7 grams  LV mass(C)dI: 78.9 grams/m2  Ao root diam: 2.5 cm  LA dimension: 3.4  cm  asc Aorta Diam: 2.7 cm  LA/Ao: 1.3  LVOT diam: 1.8 cm  LVOT area: 2.6 cm2  LA Volume (BP): 35.3 ml  LA Volume Index (BP): 21.0 ml/m2  RWT: 0.43           Doppler Measurements & Calculations  MV E max wendi: 80.0 cm/sec  MV A max wendi: 112.5 cm/sec  MV E/A: 0.71  MV dec slope: 389.9 cm/sec2  LV V1 max PG: 3.0 mmHg  LV V1 max: 86.3 cm/sec  LV V1 VTI: 20.8 cm  SV(LVOT): 54.1 ml  SI(LVOT): 32.2 ml/m2  E/E' avg: 10.8  Lateral E/e': 9.4  Medial E/e': 12.3           _____________________________________________________________________________  __           Report approved by: Radha Miles 11/03/2019 11:02 AM

## 2019-11-03 NOTE — PROGRESS NOTES
Called lab regarding troponin draw for 1645. They will add on for 1400 lab pt stated she was just poked at when she arrived to the floor.

## 2019-11-03 NOTE — PLAN OF CARE
SLP-  Order for swallow evaluation received.  Nurse reports pt has been eating without any difficulty on a regular diet.  Spoke with pt who had consumed 100% of her regular diet this am.  She reports that she has no new problems with swallowing.  Chewing and swallowing are at baseline.  Pt drank 3 oz continuously for SLP without any overt s/s aspiration, laryngeal elevation observed.  Voicing normal.  Pt's speech in conversation is wnl at this time.  She reports no difficulty today with word finding.  Primary language is [place], English is second language.  She stated she only had difficulty finding words yesterday, today is back to normal.  No SLP concerns today.  Informed nursing and patient that evaluation by SLP not necessary at this time, but if any new concerns develop to contact SLP again.  She should continue with regular diet and in chair all oral intake.

## 2019-11-03 NOTE — DISCHARGE INSTRUCTIONS
Neurologist recommends that you check your blood pressure three times a day and keep a log to bring to your primary care physician as you may also have undiagnosed hypertension as well

## 2019-11-03 NOTE — PLAN OF CARE
Pt here with r/o CVA. A&Ox4. Neuros intact ex HA. At 1840 pt reported that she felt like she was going to pass out (see previous note). VSS ex HTN (within parameters) & occasionally tachycardic. Tele NSR to ST. Regular diet, thin liquids. Takes pills whole. Up with SBA. C/o severe HA initially upon arriving to the floor, improved to mild with rest & cold compress. LS clear. Pt scoring green on the Aggression Stop Light Tool. Plan MRI & Echo. Discharge pending workup. Need UA.

## 2019-11-03 NOTE — PLAN OF CARE
Discharge Planner OT   Patient plan for discharge: home with spouse  Current status: evaluation completed.  Patient lives with spouse in a 2 story house with 18 stairs to second level.  She reports being independent with all ADLs/IADLs at baseline.  Today patient presented with BUE strength 4/5, patient states this is baseline.  Patient independent with bed mobility, toilet transfer, LB dressing tasks, tub/shower transfer, ambulation in hallway x150' without assistive device including mild balance challenges and scanning tasks, stairs x18, brief cognitive and visual screens. No skilled OT or PT needs identified.  Barriers to return to prior living situation: no OT barriers  Recommendations for discharge: home with spouse  Rationale for recommendations: no skilled OT needs identified.  Pt reports being at baseline.  Will complete orders.       Entered by: JESSICA ESCALANTE 11/03/2019 8:59 AM

## 2019-11-03 NOTE — PROGRESS NOTES
11/03/19 0800   Quick Adds   Type of Visit Initial Occupational Therapy Evaluation   Living Environment   Lives With spouse   Living Arrangements house   Home Accessibility stairs to enter home;stairs within home   Number of Stairs, Within Home, Primary other (see comments)  (18)   Stair Railings, Within Home, Primary railings safe and in good condition   Transportation Anticipated car, drives self;family or friend will provide   Living Environment Comment Pt lives in 2 story home, with bed upstairs. She can stay in main floor bedroom if needed. Pt works cleaning houses.   Self-Care   Usual Activity Tolerance good   Current Activity Tolerance good   Equipment Currently Used at Home none   Activity/Exercise/Self-Care Comment Pt reports indep with ambulation without AD at baseline   Functional Level   Ambulation 0-->independent   Transferring 0-->independent   Toileting 0-->independent   Bathing 0-->independent   Dressing 0-->independent   Eating 0-->independent   Communication 0-->understands/communicates without difficulty   Swallowing 0-->swallows foods/liquids without difficulty   Cognition 0 - no cognition issues reported   Fall history within last six months no   Which of the above functional risks had a recent onset or change? communication/speech   Prior Functional Level Comment Pt reports indep with ADLs at baseline       Present no   Language English   General Information   Onset of Illness/Injury or Date of Surgery - Date 11/02/19   Referring Physician Tejinder Ovalles   Patient/Family Goals Statement home   Additional Occupational Profile Info/Pertinent History of Current Problem Pt presented to ED with expressive aphasia and headache   Precautions/Limitations fall precautions   Weight-Bearing Status - LUE full weight-bearing   Weight-Bearing Status - RUE full weight-bearing   Weight-Bearing Status - LLE full weight-bearing   Weight-Bearing Status - RLE full weight-bearing   Heart  Disease Risk Factors High blood pressure;Age   General Observations Pt supine upon arrival; pt agreeable to OT session   General Info Comments Activity order: up with assist   Cognitive Status Examination   Orientation orientation to person, place and time   Level of Consciousness alert   Follows Commands (Cognition) WNL   Memory intact   Cognitive Comment Pt reports being back to baseline with memory; no deficits noted with brief screening   Visual Perception   Visual Perception No deficits were identified   Sensory Examination   Sensory Quick Adds No deficits were identified   Pain Assessment   Patient Currently in Pain No   Integumentary/Edema   Integumentary/Edema no deficits were identifed   Posture   Posture forward head position;protracted shoulders   Range of Motion (ROM)   ROM Comment BUE WNL   Strength   Strength Comments BUE generally 4/5; pt states she is not strong at baseline   Coordination   Upper Extremity Coordination No deficits were identified   Mobility   Bed Mobility Bed mobility skill: Sit to supine;Bed mobility skill: Supine to sit   Bed Mobility Skill: Sit to Supine   Level of [place]: Sit/Supine independent   Bed Mobility Skill: Supine to Sit   Level of [place]: Supine/Sit independent   Transfer Skill: Sit to Stand   Level of [place]: Sit/Stand independent   Transfer Skill: Toilet Transfer   Level of [place]: Toilet independent   Toilet Transfer Skill Comments pt has standard height toilet at home   Tub/Shower Transfer   Tub/Shower Transfer Comments pt has walk-in shower, and tub/shower; completed tub/shower transfer indep.   Balance   Balance Comments good seated; good ambulating in hallway x150 with mild balance challenges and scanning side to side/up/down   Lower Body Dressing   Level of [place]: Dress Lower Body independent   Instrumental Activities of Daily Living (IADL)   Previous Responsibilities meal prep;housekeeping;laundry;shopping;medication  "management;finances;driving;work   IADL Comments family can assist   Activities of Daily Living Analysis   Impairments Contributing to Impaired Activities of Daily Living strength decreased   Clinical Impression   Criteria for Skilled Therapeutic Interventions Met evaluation only   OT Diagnosis weakness   Influenced by the following impairments decreased strength   Assessment of Occupational Performance 1-3 Performance Deficits   Identified Performance Deficits none   Clinical Decision Making (Complexity) Low complexity   Anticipated Discharge Disposition Home with Assist   Patient, Family & other staff in agreement with plan of care Yes   [facility] AM-PAC  \"6 Clicks\" Daily Activity Inpatient Short Form   1. Putting on and taking off regular lower body clothing? 4 - None   2. Bathing (including washing, rinsing, drying)? 4 - None   3. Toileting, which includes using toilet, bedpan or urinal? 4 - None   4. Putting on and taking off regular upper body clothing? 4 - None   5. Taking care of personal grooming such as brushing teeth? 4 - None   6. Eating meals? 4 - None   Daily Activity Raw Score (Score out of 24.Lower scores equate to lower levels of function) 24   Total Evaluation Time   Total Evaluation Time (Minutes) 15     "

## 2019-11-03 NOTE — PLAN OF CARE
"Pt here to rule out CVA.A&OX4.VSS high BP but within the parameter.c/o HA managed with tylenol.C/o\" Passing out feelings \" intermittently.On Regular diet.Up with one voided adequately.Had MRI this shift  Back as normal.Tele NSR.Patient refused Lipitor medication irrespective of RN given education about the importance of medication.plan to have ECHO today  "

## 2019-11-04 ENCOUNTER — TELEPHONE (OUTPATIENT)
Dept: INTERNAL MEDICINE | Facility: CLINIC | Age: 69
End: 2019-11-04

## 2019-11-04 NOTE — TELEPHONE ENCOUNTER
IP F/U    Date: 11/3/19  Diagnosis: Cerebrovascular Accident (Cva), Unspecified Mechanism (H)  Is patient active in care coordination? No  Was patient in TCU? No    ED / Discharge Outreach Protocol    Patient Contact    Attempt # 1    Was call answered?  No.  Left message on voicemail with information to call me back. Please transfer to RN. Patient needs hospital follow up scheduled within 7 days per discharge summary.

## 2019-11-05 NOTE — TELEPHONE ENCOUNTER
"Hospital/TCU/ED for chronic condition Discharge Protocol    \"Hi, my name is Marti Florez RN, a registered nurse, and I am calling from [facility].  I am calling to follow up and see how things are going for you after your recent emergency visit/hospital/TCU stay.\"    Tell me how you are doing now that you are home?\" OK      Discharge Instructions    \"Let's review your discharge instructions.  What is/are the follow-up recommendations?  Pt. Response: f/u with PCP    \"Has an appointment with your primary care provider been scheduled?\"   No (schedule appointment) - declined    \"When you see the provider, I would recommend that you bring your medications with you.\"    Medications    \"Tell me what changed about your medicines when you discharged?\"    Changes to chronic meds?    0-1    \"What questions do you have about your medications?\"    None     New diagnoses of heart failure, COPD, diabetes, or MI?    No              Post Discharge Medication Reconciliation Status: discharge medications reconciled, continue medications without change.    Was MTM referral placed (*Make sure to put transitions as reason for referral)?   No    Call Summary    \"What questions or concerns do you have about your recent visit and your follow-up care?\"     none    \"If you have questions or things don't continue to improve, we encourage you contact us through the main clinic number (give number).  Even if the clinic is not open, triage nurses are available 24/7 to help you.     We would like you to know that our clinic has extended hours (provide information).  We also have urgent care (provide details on closest location and hours/contact info)\"      \"Thank you for your time and take care!\"             "

## 2020-02-10 ENCOUNTER — HEALTH MAINTENANCE LETTER (OUTPATIENT)
Age: 70
End: 2020-02-10

## 2022-08-09 ENCOUNTER — HOSPITAL ENCOUNTER (EMERGENCY)
Facility: CLINIC | Age: 72
Discharge: HOME | End: 2022-08-09
Admitting: EMERGENCY MEDICINE
Payer: COMMERCIAL

## 2022-08-09 VITALS
RESPIRATION RATE: 20 BRPM | OXYGEN SATURATION: 97 % | SYSTOLIC BLOOD PRESSURE: 137 MMHG | TEMPERATURE: 98.4 F | DIASTOLIC BLOOD PRESSURE: 70 MMHG | HEART RATE: 92 BPM

## 2022-08-09 LAB
ANION GAP SERPL CALCULATED.3IONS-SCNC: 7 MMOL/L (ref 3–14)
BASOPHILS # BLD AUTO: 0 10E3/UL (ref 0–0.2)
BASOPHILS NFR BLD AUTO: 0 %
BUN SERPL-MCNC: 23 MG/DL (ref 7–30)
CALCIUM SERPL-MCNC: 9.5 MG/DL (ref 8.5–10.1)
CHLORIDE BLD-SCNC: 106 MMOL/L (ref 94–109)
CO2 SERPL-SCNC: 22 MMOL/L (ref 20–32)
CREAT SERPL-MCNC: 0.66 MG/DL (ref 0.52–1.04)
EOSINOPHIL # BLD AUTO: 0.1 10E3/UL (ref 0–0.7)
EOSINOPHIL NFR BLD AUTO: 1 %
ERYTHROCYTE [DISTWIDTH] IN BLOOD BY AUTOMATED COUNT: 12.5 % (ref 10–15)
GFR SERPL CREATININE-BSD FRML MDRD: >90 ML/MIN/1.73M2
GLUCOSE BLD-MCNC: 109 MG/DL (ref 70–99)
HCO3 BLDV-SCNC: 22 MMOL/L (ref 21–28)
HCT VFR BLD AUTO: 41.7 % (ref 35–47)
HGB BLD-MCNC: 13.9 G/DL (ref 11.7–15.7)
HOLD SPECIMEN: NORMAL
IMM GRANULOCYTES # BLD: 0 10E3/UL
IMM GRANULOCYTES NFR BLD: 0 %
LACTATE BLD-SCNC: 0.6 MMOL/L
LYMPHOCYTES # BLD AUTO: 1.6 10E3/UL (ref 0.8–5.3)
LYMPHOCYTES NFR BLD AUTO: 14 %
MCH RBC QN AUTO: 29.3 PG (ref 26.5–33)
MCHC RBC AUTO-ENTMCNC: 33.3 G/DL (ref 31.5–36.5)
MCV RBC AUTO: 88 FL (ref 78–100)
MONOCYTES # BLD AUTO: 0.9 10E3/UL (ref 0–1.3)
MONOCYTES NFR BLD AUTO: 7 %
NEUTROPHILS # BLD AUTO: 9.1 10E3/UL (ref 1.6–8.3)
NEUTROPHILS NFR BLD AUTO: 78 %
NRBC # BLD AUTO: 0 10E3/UL
NRBC BLD AUTO-RTO: 0 /100
PCO2 BLDV: 28 MM HG (ref 40–50)
PH BLDV: 7.52 [PH] (ref 7.32–7.43)
PLATELET # BLD AUTO: 333 10E3/UL (ref 150–450)
PO2 BLDV: 63 MM HG (ref 25–47)
POTASSIUM BLD-SCNC: 4 MMOL/L (ref 3.4–5.3)
RBC # BLD AUTO: 4.74 10E6/UL (ref 3.8–5.2)
SAO2 % BLDV: 94 % (ref 94–100)
SODIUM SERPL-SCNC: 135 MMOL/L (ref 133–144)
WBC # BLD AUTO: 11.7 10E3/UL (ref 4–11)

## 2022-08-09 PROCEDURE — 87040 BLOOD CULTURE FOR BACTERIA: CPT | Performed by: EMERGENCY MEDICINE

## 2022-08-09 PROCEDURE — 85014 HEMATOCRIT: CPT | Performed by: EMERGENCY MEDICINE

## 2022-08-09 PROCEDURE — 82803 BLOOD GASES ANY COMBINATION: CPT

## 2022-08-09 PROCEDURE — 36415 COLL VENOUS BLD VENIPUNCTURE: CPT | Performed by: EMERGENCY MEDICINE

## 2022-08-09 PROCEDURE — 99283 EMERGENCY DEPT VISIT LOW MDM: CPT | Mod: CS

## 2022-08-09 PROCEDURE — 80048 BASIC METABOLIC PNL TOTAL CA: CPT | Performed by: EMERGENCY MEDICINE

## 2022-08-09 NOTE — ED TRIAGE NOTES
Patient here with her .  She reports she was tested positive for covid on 7/22. She reports her SOB and cough are not improving.  She was treated with x-pack and briefly did feel better.  Has been having fevers at home and has been taking tylenol as well.      Triage Assessment     Row Name 08/09/22 1632       Triage Assessment (Adult)    Airway WDL WDL       Respiratory WDL    Respiratory WDL --  reports SOB and cough, recent covid dx        Cardiac WDL    Cardiac WDL WDL    Row Name 08/09/22 1539       Triage Assessment (Adult)    Airway WDL WDL       Respiratory WDL    Respiratory WDL WDL       Skin Circulation/Temperature WDL    Skin Circulation/Temperature WDL WDL       Cardiac WDL    Cardiac WDL WDL       Peripheral/Neurovascular WDL    Peripheral Neurovascular WDL WDL       Cognitive/Neuro/Behavioral WDL    Cognitive/Neuro/Behavioral WDL WDL

## 2022-08-09 NOTE — ED NOTES
Rapid Assessment Note    History:   Kalani Rowan is a 71 year old female who presents with COVID concern. The patient tested positive for COVID on 07/22. She presented for evaluation on 07/31 due to worsening shortness of breath and cough. She was prescribed 5 mg prednisone and azithromycin with mild improvement of symptoms. She finished her Z-shawn 2 days ago. She endorses a fever of 102.1 at home. No emesis or diarrhea.     Exam:   General:  Alert, interactive  Cardiovascular:  Well perfused  Lungs:  No respiratory distress, no accessory muscle use  Neuro:  Moving all 4 extremities  Skin:  Warm, dry  Psych:  Normal affect    Plan of Care:   Patient diagnosed with COVID on 7/22 and again seen on 7/31, completing a Z-Shawn 3 days ago.  She has return of fever as high as 102 today.  No new cough or abdominal pain or other infectious symptoms.  Labs including blood cultures and lactate are ordered and repeat chest x-ray as well.    I evaluated the patient and developed an initial plan of care. I discussed this plan and explained that I, or one of my partners, would be returning to complete the evaluation.     I, Soledad Chiang, am serving as a scribe to document services personally performed by Trierweiler, Chad, MD, based on my observations and the provider's statements to me.    08/09/2022  [employer]    Portions of this medical record were completed by a scribe. UPON MY REVIEW AND AUTHENTICATION BY ELECTRONIC SIGNATURE, this confirms (a) I performed the applicable clinical services, and (b) the record is accurate.     Trierweiler, Chad A, MD  08/09/22 1640

## 2022-08-12 ENCOUNTER — VIRTUAL VISIT (OUTPATIENT)
Dept: INTERNAL MEDICINE | Facility: CLINIC | Age: 72
End: 2022-08-12
Payer: COMMERCIAL

## 2022-08-12 ENCOUNTER — OFFICE VISIT (OUTPATIENT)
Dept: PEDIATRICS | Facility: CLINIC | Age: 72
End: 2022-08-12
Payer: COMMERCIAL

## 2022-08-12 ENCOUNTER — HOSPITAL ENCOUNTER (OUTPATIENT)
Dept: CT IMAGING | Facility: CLINIC | Age: 72
Discharge: HOME | End: 2022-08-12
Attending: PHYSICIAN ASSISTANT | Admitting: PHYSICIAN ASSISTANT
Payer: COMMERCIAL

## 2022-08-12 VITALS
TEMPERATURE: 98.6 F | HEART RATE: 93 BPM | SYSTOLIC BLOOD PRESSURE: 141 MMHG | BODY MASS INDEX: 22.49 KG/M2 | WEIGHT: 131 LBS | OXYGEN SATURATION: 99 % | DIASTOLIC BLOOD PRESSURE: 65 MMHG

## 2022-08-12 DIAGNOSIS — R05.9 COUGH: ICD-10-CM

## 2022-08-12 DIAGNOSIS — Z86.16 HISTORY OF 2019 NOVEL CORONAVIRUS DISEASE (COVID-19): Primary | ICD-10-CM

## 2022-08-12 DIAGNOSIS — R50.9 FEVER, UNSPECIFIED FEVER CAUSE: ICD-10-CM

## 2022-08-12 DIAGNOSIS — J45.20 MILD INTERMITTENT ASTHMA, UNSPECIFIED WHETHER COMPLICATED: ICD-10-CM

## 2022-08-12 DIAGNOSIS — Z86.16 HISTORY OF 2019 NOVEL CORONAVIRUS DISEASE (COVID-19): ICD-10-CM

## 2022-08-12 DIAGNOSIS — J06.9 UPPER RESPIRATORY TRACT INFECTION, UNSPECIFIED TYPE: ICD-10-CM

## 2022-08-12 DIAGNOSIS — J45.20 MILD INTERMITTENT ASTHMA, UNSPECIFIED WHETHER COMPLICATED: Primary | ICD-10-CM

## 2022-08-12 PROCEDURE — 250N000011 HC RX IP 250 OP 636: Performed by: PHYSICIAN ASSISTANT

## 2022-08-12 PROCEDURE — 258N000003 HC RX IP 258 OP 636: Performed by: PHYSICIAN ASSISTANT

## 2022-08-12 PROCEDURE — 99207 REFERRAL TO ACUTE AND DIAGNOSTIC SERVICES: CPT | Performed by: NURSE PRACTITIONER

## 2022-08-12 PROCEDURE — 71275 CT ANGIOGRAPHY CHEST: CPT

## 2022-08-12 PROCEDURE — 99417 PROLNG OP E/M EACH 15 MIN: CPT | Performed by: PHYSICIAN ASSISTANT

## 2022-08-12 PROCEDURE — 99205 OFFICE O/P NEW HI 60 MIN: CPT | Performed by: PHYSICIAN ASSISTANT

## 2022-08-12 RX ORDER — PREDNISONE 20 MG/1
TABLET ORAL
Qty: 20 TABLET | Refills: 0 | Status: SHIPPED | OUTPATIENT
Start: 2022-08-12 | End: 2022-08-31

## 2022-08-12 RX ORDER — ALBUTEROL SULFATE 90 UG/1
1-2 AEROSOL, METERED RESPIRATORY (INHALATION) EVERY 6 HOURS PRN
Qty: 18 G | Refills: 4 | Status: SHIPPED | OUTPATIENT
Start: 2022-08-12 | End: 2023-06-27

## 2022-08-12 RX ORDER — ALBUTEROL SULFATE 90 UG/1
2 AEROSOL, METERED RESPIRATORY (INHALATION) EVERY 6 HOURS
Qty: 6.7 G | Refills: 0 | Status: SHIPPED | OUTPATIENT
Start: 2022-08-12 | End: 2022-08-31

## 2022-08-12 RX ORDER — IOPAMIDOL 755 MG/ML
500 INJECTION, SOLUTION INTRAVASCULAR ONCE
Status: COMPLETED | OUTPATIENT
Start: 2022-08-12 | End: 2022-08-12

## 2022-08-12 RX ORDER — PREDNISONE 20 MG/1
TABLET ORAL
Qty: 20 TABLET | Refills: 0 | Status: SHIPPED | OUTPATIENT
Start: 2022-08-12 | End: 2022-08-12

## 2022-08-12 RX ORDER — FLUTICASONE PROPIONATE AND SALMETEROL XINAFOATE 45; 21 UG/1; UG/1
2 AEROSOL, METERED RESPIRATORY (INHALATION) 2 TIMES DAILY
Qty: 8 G | Refills: 0 | Status: SHIPPED | OUTPATIENT
Start: 2022-08-12 | End: 2022-08-31

## 2022-08-12 RX ADMIN — IOPAMIDOL 55 ML: 755 INJECTION, SOLUTION INTRAVENOUS at 16:57

## 2022-08-12 RX ADMIN — SODIUM CHLORIDE 84 ML: 9 INJECTION, SOLUTION INTRAVENOUS at 16:57

## 2022-08-12 NOTE — PROGRESS NOTES
"  {PROVIDER CHARTING PREFERENCE:857320}    Subjective   Kalani is a 71 year old{ACCOMPANIED BY STATEMENT (Optional):647943}, presenting for the following health issues:  No chief complaint on file.      HPI     Acute Illness:    Rash under right breast (has somewhat improved), naval itching (now gone- after Prednisone.) Feels she may have shingles. Z-grant & prednisone helped for a bit. X-rays negative. Starting 8/10 \"Can't even open eyes. O2 88-92%, P110, T102.3. Taking Ibu off &  On.  has had no illness, 3 negative COVID tests.  is a doctor, pt is a nurse.   Acute illness concerns:   Onset/Duration:   Symptoms:  Fever: YES  Decreased energy level: YES  Conjunctivitis: No  Ear Pain: No  Rhinorrhea: YES  Congestion: YES  Sore Throat: YES- Off&on  Cough: YES - not very productive  Wheeze: No  Breathing fast: YES           Decreased Appetite/Intake: YES  Nausea: YES  Vomiting: No  Diarrhea: No  Progression of Symptoms: worse constant  Sick/Strep Exposure: No  Therapies tried and outcome: Antibiotics from UC, Prednisone, Advil, Albuterol inhaler     {additonal problems for provider to add (Optional):936007}    Review of Systems   {ROS COMP (Optional):125236}      Objective    There were no vitals taken for this visit.  There is no height or weight on file to calculate BMI.  Physical Exam   {Exam List (Optional):772382}    {Diagnostic Test Results (Optional):683810}    {AMBULATORY ATTESTATION (Optional):859889}            .  ..  "

## 2022-08-12 NOTE — PROGRESS NOTES
Kalani is a 71 year old who is being evaluated via a billable video visit.      How would you like to obtain your AVS? MyChart  If the video visit is dropped, the invitation should be resent by: Text to cell phone: [phone]  Will anyone else be joining your video visit? No        Assessment & Plan     History of 2019 novel coronavirus disease (COVID-19)  She has not recovered since 7/2022     Mild intermittent asthma, unspecified whether complicated  Needs refill   - albuterol (PROAIR HFA) 108 (90 Base) MCG/ACT inhaler; Inhale 1-2 puffs into the lungs every 6 hours as needed for shortness of breath / dyspnea    Upper respiratory tract infection, unspecified type  Not recovering   Has done z pack and prednisone and  felt better during this treatment   She is hoping to do CT scan since her fever and cough and SOB is persistent and lasting so long   Has had 2 chest x ray negative     Cough  Discussed   Using albuterol 4 times a day     Fever, unspecified fever cause  ibuprofen or tylenol helps     .Referral to Acute and Diagnostic Services    The [facility] has been contacted at [phone] ([place]) to confirm patient acceptance. The transition to Acute & Diagnostic Services Clinic has been discussed with patient, and she agrees with next level of care.  Patient understands that evaluation/treatment at [facility] typically takes significantly longer than in clinic/urgent care (>2 hours).          Special issues:  None          Referral placed: Yes  Patient has transportation arranged and will travel to the [facility] without delay: Yes      The following provider has assessed this patient for intervention at [facility], and directed the patient for referral: CARL Mcarthur CNP, APRN CNP          27 minutes spent on the date of the encounter doing chart review, history and exam, documentation and further activities per the note       Patient  Instructions   Acute and Diagnostic referral   Arrive at 2 pm      Return in about 6 months (around 2/12/2023).    CARL Mcarthur CNP  [facility]    Shanae Uriarte is a 71 year old, presenting for the following health issues:  ER F/U      HPI     ED/UC Followup:    Facility:  [facility]  Date of visit: 8/9/22  Reason for visit: cough, fever  Current Status: stable    z pack stop 8/4  Prednisone stopped 8/6 8/8 did  Not feel again     Was seen 8/9/2022  102.3  o2 sat 88-92%    Yesterday  100  Pulse 111  o2 sat 88-92%  Takes advil one pill helps temp    Temp 99.5 today   o2 sat 98%        Review of Systems   Constitutional, HEENT, cardiovascular, pulmonary, GI, , musculoskeletal, neuro, skin, endocrine and psych systems are negative, except as otherwise noted.      Objective           Vitals:  No vitals were obtained today due to virtual visit.    Physical Exam   GENERAL: alert and cough - c/o fever and desat  RESP: No audible wheeze, occasional cough  She is speaking in paragraphs   PSYCH: Mentation appears normal                Video-Visit Details    Video Start Time: 11:27 AM    Type of service:  Video Visit    Video End Time:11:50 AM    Originating Location (pt. Location): Home    Distant Location (provider location):  [facility]     Platform used for Video Visit: Shahida    .  Tonny.

## 2022-08-12 NOTE — PROGRESS NOTES
Assessment/Plan:    Lungs CTAB currently, O2 sat 99%, pt speaking in complete sentences.   CT normal- no evidence of pneumonia, pleural effusion, or pulmonary embolism.   Suspect symptoms due to long COVID, exacerbated due to patient's history of asthma. No evidence for bacterial infection.   Rx Advair and refilled albuterol. Also given Rx for longer prednisone taper to be used if no improvement with Advair.  Did offer referral to post-COVID 19 clinic for further guidance & management, pt declines at this time.   Recommended ER if O2 sat < 90% persists or SOB worsens.    See patient instructions below.    At the end of the encounter, I discussed results, diagnosis, medications. Discussed red flags for immediate return to clinic/ER, as well as indications for follow up if no improvement. Patient understood and agreed to plan. Patient was stable for discharge.    90 minutes was spent preparing for the visit and reviewing the patient's chart; getting a history and performing an exam; counseling and providing education to the patient, family, or caregiver; ordering medicines, tests, or procedures; communicating with other healthcare professionals; documenting information in the medical record; interpreting results and sharing that information with the patient, family, or caregiver; and care coordination.      ICD-10-CM    1. Mild intermittent asthma, unspecified whether complicated  J45.20 Referral to Acute and Diagnostic Services (Day of diagnostic / First order acute)     predniSONE (DELTASONE) 20 MG tablet     albuterol (PROAIR HFA/PROVENTIL HFA/VENTOLIN HFA) 108 (90 Base) MCG/ACT inhaler     fluticasone-salmeterol (ADVAIR-HFA) 45-21 MCG/ACT inhaler     DISCONTINUED: predniSONE (DELTASONE) 20 MG tablet   2. History of 2019 novel coronavirus disease (COVID-19)  Z86.16 Referral to Acute and Diagnostic Services (Day of diagnostic / First order acute)     CT Chest Pulmonary Embolism w Contrast   3. Upper respiratory  tract infection, unspecified type  J06.9 Referral to Acute and Diagnostic Services (Day of diagnostic / First order acute)   4. Cough  R05.9 Referral to Acute and Diagnostic Services (Day of diagnostic / First order acute)     CT Chest Pulmonary Embolism w Contrast   5. Fever, unspecified fever cause  R50.9 sodium chloride (PF) 0.9% PF flush 3 mL     Referral to Acute and Diagnostic Services (Day of diagnostic / First order acute)     CT Chest Pulmonary Embolism w Contrast         Return in about 1 week (around 8/19/2022) for Recheck with primary care provider.    LAWANDA Steinberg, PAKELTON  [facility]  -----------------------------------------------------------------------------------------------------------------------------------------------------    HPI:  Kalani Rowan is a 71 year old female with history of asthma who presents for evaluation of cough, SOB, and fever onset 3.5 weeks ago. Symptoms began 7/18 and she tested positive for COVID on 7/22. She did not take any antivirals or monoclonal antibody treatment for her COVID. She has been seen many times for this and has had 2 negative chest X-rays, urinalysis, labs, and blood cultures. She was prescribed prednisone & azithromycin recently and did feel slightly better while taking these, but now that she has ran out her symptoms are back to where they were initially. She completed the azithromycin on 8/4 and the prednisone on 8/6. She notes her fever tends to occur in the evenings and is still up to 102 F at times. Ibuprofen and Tylenol do help to temporarily reduce her fever. Her O2 sat is low intermittently, was between 88-92% last night. She notes SOB occurs with any activity. She notes her chest feels tight. She is using her albuterol inhaler about 4 times daily, which she uses for asthma, and this does help the SOB temporarily. She states she has a hx of pneumonia following viral infections, and is concerned she may have this  again. Patient reports no ear pain, sore throat, sinus pressure/facial pain, nasal congestion, headache, chest pain, abdominal pain, nausea, vomiting, diarrhea, urinary symptoms, rash, or any other symptoms.     Past Medical History:   Diagnosis Date     Allergy, unspecified not elsewhere classified        Vitals:    08/12/22 1421   BP: (!) 141/65   Patient Position: Sitting   Pulse: 93   Temp: 98.6  F (37  C)   TempSrc: Oral   SpO2: 99%   Weight: 59.4 kg (131 lb)       Physical Exam  Vitals and nursing note reviewed.   HENT:      Right Ear: Tympanic membrane normal.      Left Ear: Tympanic membrane normal.      Nose: Nose normal.      Comments: No sinus tenderness     Mouth/Throat:      Mouth: Mucous membranes are moist.      Pharynx: Oropharynx is clear.   Cardiovascular:      Rate and Rhythm: Normal rate and regular rhythm.      Heart sounds: Normal heart sounds.   Pulmonary:      Effort: Pulmonary effort is normal.      Breath sounds: Normal breath sounds.   Neurological:      Mental Status: She is alert.         Labs/Imaging:  No results found for this or any previous visit (from the past 24 hour(s)).  No results found for this or any previous visit (from the past 24 hour(s)).    Results for orders placed or performed during the hospital encounter of 08/12/22   CT Chest Pulmonary Embolism w Contrast     Status: None    Narrative    EXAM: CT CHEST PULMONARY EMBOLISM W CONTRAST  LOCATION: [facility]  DATE/TIME: 8/12/2022 4:56 PM    INDICATION: SOB, cough, fever x 4 weeks, COVID positive on 7 22. hx asthma. evaluate for PE pneumonia  COMPARISON: None.  TECHNIQUE: CT chest pulmonary angiogram during arterial phase injection of IV contrast. Multiplanar reformats and MIP reconstructions were performed. Dose reduction techniques were used.   CONTRAST: 55mL Isovue 370    FINDINGS:  ANGIOGRAM CHEST: No evidence for pulmonary embolism. Pulmonary arteries normal in caliber. Thoracic aorta normal in  caliber. No aortic dissection or other acute abnormality. Aortic root obscured by motion.    HEART: Cardiac chambers within normal limits. No pericardial effusion. No coronary artery calcification.    MEDIASTINUM: No adenopathy or mass. Small hiatal hernia.    LUNGS AND PLEURA: No pulmonary mass, consolidation, or suspicious pulmonary nodule. No pleural effusion or pneumothorax.    LIMITED UPPER ABDOMEN: Negative.    MUSCULOSKELETAL: Negative.      Impression    IMPRESSION:  1.  No evidence for pulmonary embolism.       Patient Instructions   Continue albuterol as needed.

## 2022-08-14 LAB — BACTERIA BLD CULT: NO GROWTH

## 2022-08-19 ENCOUNTER — PRE VISIT (OUTPATIENT)
Dept: INFECTIOUS DISEASES | Facility: CLINIC | Age: 72
End: 2022-08-19

## 2022-08-19 ENCOUNTER — VIRTUAL VISIT (OUTPATIENT)
Dept: INFECTIOUS DISEASES | Facility: CLINIC | Age: 72
End: 2022-08-19
Attending: STUDENT IN AN ORGANIZED HEALTH CARE EDUCATION/TRAINING PROGRAM
Payer: COMMERCIAL

## 2022-08-19 DIAGNOSIS — Z86.16 HISTORY OF COVID-19: ICD-10-CM

## 2022-08-19 DIAGNOSIS — R50.9 PERSISTENT FEVER: Primary | ICD-10-CM

## 2022-08-19 DIAGNOSIS — R06.02 SHORTNESS OF BREATH: ICD-10-CM

## 2022-08-19 PROCEDURE — 99205 OFFICE O/P NEW HI 60 MIN: CPT | Mod: GC | Performed by: STUDENT IN AN ORGANIZED HEALTH CARE EDUCATION/TRAINING PROGRAM

## 2022-08-19 NOTE — NURSING NOTE
Chief Complaint   Patient presents with     Follow Up     Kalani, is here for a video visit and states she has shortness a breathe    Andrea Hummel VF

## 2022-08-19 NOTE — LETTER
8/19/2022       RE: Kalani Rowan  [address] [place] [address]     Dear Colleague,    Thank you for referring your patient, Kalani Rowan, to the [facility] INFECTIOUS DISEASE CLIN[facility]. Please see a copy of my visit note below.    Kalani is a 71 year old who is being evaluated via a billable video visit.      How would you like to obtain your AVS? "[employer]hart  If the video visit is dropped, the invitation should be resent by: Text to cell phone: [phone]  Will anyone else be joining your video visit? No      Video start time 4.03 pm   Video end time - 4.43 pm     Total time including chart review, care-coordination and documentation time on the date of encounter - 60 mins          [facility]  Infectious Disease Consultation note  Today's Date: 08/19/2022    Recommendations:  Suspect persistent asthma and airway inflammation. Agree with Dr. Thomas to try prednisone and albuterol nebulizer and see how she does. If the fevers persist on the prednisone she is to send a Message[employer]unker message. I will add her on and pursue further FUO work up. Otherwise plan for follow up Sep 28 at 2 pm video visit.     Thank you for involving Infectious Disease in the care of this patient.     Ronel Tom  , [facility]  Pager - [phone]    Assessment:  Kalani Rowan is a 71 year old with PMH of allergies and anaphylactic shock, intermittent asthma, history of pneumonias 3-4 times.     Fevers since COVID July 18/2022 : associated with SOB. Temporarily responded to z grant and prednisone. CBC, BMP, UA, blood cx, CT chest neg. 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     - Immunization status - to address later      -------------------------------------------------------------------------------------------------------------------    Reason for consult / Chief complaint:   Consulted by  Dr. Lind for [facility]    History of presenting illness:  Kalani Rowan is a 71 year old with PMH of allergies and anaphylactic shock, intermittent asthma, history of pneumonias 3-4 times.      July 18 - developed fever to 102 and cough, went to urgent care - covid positive,  was also pos for covid at that time. Fevers improved to 99.5 and then had no fevers for 2-3 days. Then fevers recurred, had rash under breast about 1 cm in size- went to urgent care - had VZV PCR lesions testing which was neg, was given z grant and prednisone - fevers decreased to none for a few days with that. Fevers started again 8/8. Went to urgent care 8/9 again.   CT chest was neg 8/12. Current symptoms include Fever and a hard time breathing and talking -pulse oximeter yesterday read 89-91% but this is usually temporary and happens when she has a fever and it improves rapidly, pulse 107-117 sometimes with fever, develops chills before the fever. Fever is usually in the evening.   Was Placed on prednisone yesterday and advair nebulizer (since advair inhaller did not help) by Dr. Thomas  - internal medicine.     Work up with CBC, BMP, UA, blood cx, CT chest neg.     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 Did not receive latent TB treatment   Born and raised in [facility]   Came to the  in 1992.   Hobbies: travel - [place], [place], [place], [place], [place], [place] - last travel was in April 2022.   No pets, no exposure to animals       Social Hx:  Social History     Tobacco Use     Smoking status: Never Smoker     Smokeless tobacco: Never Used   Substance Use Topics     Alcohol use: No     Alcohol/week: 0.0 standard drinks       Immunizations:  Immunization History   Administered Date(s) Administered     Influenza (IIV3) PF 11/20/2000, 11/01/2005         Allergies:   Allergies   Allergen Reactions     Codeine      Elderberry [Sambucus Canadensis]      Elderberry  syrup- ER.          Medications:  Current Outpatient Medications   Medication Sig Dispense Refill     albuterol (PROAIR HFA) 108 (90 Base) MCG/ACT inhaler Inhale 1-2 puffs into the lungs every 6 hours as needed for shortness of breath / dyspnea 18 g 4     albuterol (PROAIR HFA/PROVENTIL HFA/VENTOLIN HFA) 108 (90 Base) MCG/ACT inhaler Inhale 2 puffs into the lungs every 6 hours 6.7 g 0     diphenhydrAMINE (BENADRYL) 25 MG tablet Take 2 tablets (50 mg) by mouth every 6 hours as needed for itching or allergies 56 tablet 0     fluticasone-salmeterol (ADVAIR-HFA) 45-21 MCG/ACT inhaler Inhale 2 puffs into the lungs 2 times daily 8 g 0     Multiple Vitamins-Minerals (OCUVITE PRESERVISION PO) Take 1 capsule by mouth daily       predniSONE (DELTASONE) 20 MG tablet Take 3 tabs daily for 3 days, 2 tabs daily for 3 days, 1 tab daily for 3 days, then 1/2 tab tab daily for 3 days 20 tablet 0         Past Medical Hx:  Past Medical History:   Diagnosis Date     Allergy, unspecified not elsewhere classified        Family History:  Family History   Problem Relation Age of Onset     Colon Cancer Mother      Other Cancer Father         skin     Heart Failure Father        Examination:  Unable to examine due to virtual visit       Laboratory:  Hematology:  Recent Labs   Lab Test 08/09/22  1635 11/02/19  1406 02/06/18  0932 04/27/16  0928   WBC 11.7* 6.9 5.8 5.1   ANEU  --  3.6  --   --    ALYM  --  2.8  --   --    SILVESTRE  --  0.4  --   --    AEOS  --  0.1  --   --    HGB 13.9 16.6* 15.0 14.5   HCT 41.7 47.2* 44.2 43.4    286 287 260       Chemistry:  Recent Labs   Lab Test 08/09/22  1644 08/09/22  1635 11/02/19  1406 02/06/18  0932 04/27/16  0928   NA  --  135 137 143 142   POTASSIUM  --  4.0 3.6 3.9 4.2   CHLORIDE  --  106 106 108 111*   CO2  --  22 23 24 21   ANIONGAP  --  7 8 11 10   BUN  --  23 19 16 14   CR  --  0.66 0.65 0.72 0.68   GFRESTIMATED  --  >90 >90 81 87   GLC  --  109* 107* 87 102*   A1C  --   --  5.3  --   --     APRIL  --  9.5 9.6 9.5 9.2   LACT 0.6  --   --   --   --        Liver Function Studies:  Recent Labs   Lab Test 11/02/19  1406 02/06/18  0932 04/27/16  0928   BILITOTAL 0.4 0.4 0.2   ALKPHOS 155* 120 107   ALBUMIN 4.4 4.1 4.0   AST 22 21 18   ALT 36 27 29       Microbiology:  Blood cx 8/9/22 no growth     COVID PCR pos 7/22/22    Imaging:  CT chest 8/12/22 - normal

## 2022-08-19 NOTE — PROGRESS NOTES
Kalani is a 71 year old who is being evaluated via a billable video visit.      How would you like to obtain your AVS? [employer]  If the video visit is dropped, the invitation should be resent by: Text to cell phone: [phone]  Will anyone else be joining your video visit? No      Video start time 4.03 pm   Video end time - 4.43 pm     Total time including chart review, care-coordination and documentation time on the date of encounter - 60 mins          [facility]  Infectious Disease Consultation note  Today's Date: 08/19/2022    Recommendations:  Suspect persistent asthma and airway inflammation. Agree with Dr. Thomas to try prednisone and albuterol nebulizer and see how she does. If the fevers persist on the prednisone she is to send a Drifty message. I will add her on and pursue further FUO work up. Otherwise plan for follow up Sep 28 at 2 pm video visit.     Thank you for involving Infectious Disease in the care of this patient.     Ronel Tom  , [facility]  Pager - [phone]    Assessment:  Kalani Rowan is a 71 year old with PMH of allergies and anaphylactic shock, intermittent asthma, history of pneumonias 3-4 times.     Fevers since COVID July 18/2022 : associated with SOB. Temporarily responded to z grant and prednisone. CBC, BMP, UA, blood cx, CT chest neg. 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     - Immunization status - to address later      -------------------------------------------------------------------------------------------------------------------    Reason for consult / Chief complaint:   Consulted by Dr. Lind for FUO    History of presenting illness:  Kalani Rowan is a 71 year old with PMH of allergies and anaphylactic shock, intermittent asthma, history of pneumonias 3-4 times.      July 18 - developed fever to 102 and cough, went to urgent care - covid positive,  was also pos for covid at that time. Fevers  improved to 99.5 and then had no fevers for 2-3 days. Then fevers recurred, had rash under breast about 1 cm in size- went to urgent care - had VZV PCR lesions testing which was neg, was given z grant and prednisone - fevers decreased to none for a few days with that. Fevers started again 8/8. Went to urgent care 8/9 again.   CT chest was neg 8/12. Current symptoms include Fever and a hard time breathing and talking -pulse oximeter yesterday read 89-91% but this is usually temporary and happens when she has a fever and it improves rapidly, pulse 107-117 sometimes with fever, develops chills before the fever. Fever is usually in the evening.   Was Placed on prednisone yesterday and advair nebulizer (since advair inhaller did not help) by Dr. Thomas  - internal medicine.     Work up with CBC, BMP, UA, blood cx, CT chest neg.     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 Did not receive latent TB treatment   Born and raised in [facility]   Came to the  in 1992.   Hobbies: travel - [place], [place], [place], [place], [place], [place] - last travel was in April 2022.   No pets, no exposure to animals       Social Hx:  Social History     Tobacco Use     Smoking status: Never Smoker     Smokeless tobacco: Never Used   Substance Use Topics     Alcohol use: No     Alcohol/week: 0.0 standard drinks       Immunizations:  Immunization History   Administered Date(s) Administered     Influenza (IIV3) PF 11/20/2000, 11/01/2005         Allergies:   Allergies   Allergen Reactions     Codeine      Elderberry [Sambucus Canadensis]      Elderberry syrup- ER.          Medications:  Current Outpatient Medications   Medication Sig Dispense Refill     albuterol (PROAIR HFA) 108 (90 Base) MCG/ACT inhaler Inhale 1-2 puffs into the lungs every 6 hours as needed for shortness of breath / dyspnea 18 g 4     albuterol (PROAIR HFA/PROVENTIL HFA/VENTOLIN HFA) 108 (90 Base) MCG/ACT  inhaler Inhale 2 puffs into the lungs every 6 hours 6.7 g 0     diphenhydrAMINE (BENADRYL) 25 MG tablet Take 2 tablets (50 mg) by mouth every 6 hours as needed for itching or allergies 56 tablet 0     fluticasone-salmeterol (ADVAIR-HFA) 45-21 MCG/ACT inhaler Inhale 2 puffs into the lungs 2 times daily 8 g 0     Multiple Vitamins-Minerals (OCUVITE PRESERVISION PO) Take 1 capsule by mouth daily       predniSONE (DELTASONE) 20 MG tablet Take 3 tabs daily for 3 days, 2 tabs daily for 3 days, 1 tab daily for 3 days, then 1/2 tab tab daily for 3 days 20 tablet 0         Past Medical Hx:  Past Medical History:   Diagnosis Date     Allergy, unspecified not elsewhere classified        Family History:  Family History   Problem Relation Age of Onset     Colon Cancer Mother      Other Cancer Father         skin     Heart Failure Father        Examination:  Unable to examine due to virtual visit       Laboratory:  Hematology:  Recent Labs   Lab Test 08/09/22  1635 11/02/19  1406 02/06/18  0932 04/27/16  0928   WBC 11.7* 6.9 5.8 5.1   ANEU  --  3.6  --   --    ALYM  --  2.8  --   --    SILVESTRE  --  0.4  --   --    AEOS  --  0.1  --   --    HGB 13.9 16.6* 15.0 14.5   HCT 41.7 47.2* 44.2 43.4    286 287 260       Chemistry:  Recent Labs   Lab Test 08/09/22  1644 08/09/22  1635 11/02/19  1406 02/06/18  0932 04/27/16  0928   NA  --  135 137 143 142   POTASSIUM  --  4.0 3.6 3.9 4.2   CHLORIDE  --  106 106 108 111*   CO2  --  22 23 24 21   ANIONGAP  --  7 8 11 10   BUN  --  23 19 16 14   CR  --  0.66 0.65 0.72 0.68   GFRESTIMATED  --  >90 >90 81 87   GLC  --  109* 107* 87 102*   A1C  --   --  5.3  --   --    APRIL  --  9.5 9.6 9.5 9.2   LACT 0.6  --   --   --   --        Liver Function Studies:  Recent Labs   Lab Test 11/02/19  1406 02/06/18  0932 04/27/16  0928   BILITOTAL 0.4 0.4 0.2   ALKPHOS 155* 120 107   ALBUMIN 4.4 4.1 4.0   AST 22 21 18   ALT 36 27 29       Microbiology:  Blood cx 8/9/22 no growth     COVID PCR pos  7/22/22    Imaging:  CT chest 8/12/22 - normal

## 2022-08-22 ENCOUNTER — OFFICE VISIT (OUTPATIENT)
Dept: INTERNAL MEDICINE | Facility: CLINIC | Age: 72
End: 2022-08-22
Payer: COMMERCIAL

## 2022-08-22 VITALS
OXYGEN SATURATION: 96 % | DIASTOLIC BLOOD PRESSURE: 74 MMHG | TEMPERATURE: 98 F | RESPIRATION RATE: 18 BRPM | BODY MASS INDEX: 25.2 KG/M2 | HEIGHT: 59 IN | SYSTOLIC BLOOD PRESSURE: 132 MMHG | HEART RATE: 111 BPM | WEIGHT: 125 LBS

## 2022-08-22 DIAGNOSIS — U07.1 INFECTION DUE TO 2019 NOVEL CORONAVIRUS: ICD-10-CM

## 2022-08-22 DIAGNOSIS — J45.20 MILD INTERMITTENT ASTHMA, UNSPECIFIED WHETHER COMPLICATED: ICD-10-CM

## 2022-08-22 DIAGNOSIS — Z00.00 PREVENTATIVE HEALTH CARE: Primary | ICD-10-CM

## 2022-08-22 PROCEDURE — U0003 INFECTIOUS AGENT DETECTION BY NUCLEIC ACID (DNA OR RNA); SEVERE ACUTE RESPIRATORY SYNDROME CORONAVIRUS 2 (SARS-COV-2) (CORONAVIRUS DISEASE [COVID-19]), AMPLIFIED PROBE TECHNIQUE, MAKING USE OF HIGH THROUGHPUT TECHNOLOGIES AS DESCRIBED BY CMS-2020-01-R: HCPCS | Performed by: INTERNAL MEDICINE

## 2022-08-22 PROCEDURE — 99213 OFFICE O/P EST LOW 20 MIN: CPT | Mod: CS | Performed by: INTERNAL MEDICINE

## 2022-08-22 PROCEDURE — G0438 PPPS, INITIAL VISIT: HCPCS | Performed by: INTERNAL MEDICINE

## 2022-08-22 PROCEDURE — U0005 INFEC AGEN DETEC AMPLI PROBE: HCPCS | Performed by: INTERNAL MEDICINE

## 2022-08-22 RX ORDER — FLUTICASONE PROPIONATE AND SALMETEROL XINAFOATE 230; 21 UG/1; UG/1
2 AEROSOL, METERED RESPIRATORY (INHALATION) 2 TIMES DAILY
Qty: 12 G | Refills: 3 | Status: SHIPPED | OUTPATIENT
Start: 2022-08-22 | End: 2023-04-07

## 2022-08-22 RX ORDER — IBUPROFEN 800 MG/1
TABLET, FILM COATED ORAL
COMMUNITY
End: 2022-08-31

## 2022-08-22 ASSESSMENT — ENCOUNTER SYMPTOMS
DYSURIA: 0
PARESTHESIAS: 0
HEADACHES: 1
FEVER: 0
DIARRHEA: 0
FREQUENCY: 0
NERVOUS/ANXIOUS: 1
HEMATOCHEZIA: 0
BREAST MASS: 0
DIZZINESS: 1
EYE PAIN: 0
SHORTNESS OF BREATH: 1
HEARTBURN: 1
CONSTIPATION: 1
ABDOMINAL PAIN: 0
WEAKNESS: 1
NAUSEA: 1
COUGH: 1
JOINT SWELLING: 0
PALPITATIONS: 0
MYALGIAS: 0
HEMATURIA: 0
SORE THROAT: 0
ARTHRALGIAS: 1
CHILLS: 1

## 2022-08-22 ASSESSMENT — ASTHMA QUESTIONNAIRES
QUESTION_1 LAST FOUR WEEKS HOW MUCH OF THE TIME DID YOUR ASTHMA KEEP YOU FROM GETTING AS MUCH DONE AT WORK, SCHOOL OR AT HOME: ALL OF THE TIME
EMERGENCY_ROOM_LAST_YEAR_TOTAL: ONE
QUESTION_3 LAST FOUR WEEKS HOW OFTEN DID YOUR ASTHMA SYMPTOMS (WHEEZING, COUGHING, SHORTNESS OF BREATH, CHEST TIGHTNESS OR PAIN) WAKE YOU UP AT NIGHT OR EARLIER THAN USUAL IN THE MORNING: FOUR OR MORE NIGHTS A WEEK
QUESTION_2 LAST FOUR WEEKS HOW OFTEN HAVE YOU HAD SHORTNESS OF BREATH: MORE THAN ONCE A DAY
QUESTION_5 LAST FOUR WEEKS HOW WOULD YOU RATE YOUR ASTHMA CONTROL: POORLY CONTROLLED
ACT_TOTALSCORE: 6
ACT_TOTALSCORE: 6
QUESTION_4 LAST FOUR WEEKS HOW OFTEN HAVE YOU USED YOUR RESCUE INHALER OR NEBULIZER MEDICATION (SUCH AS ALBUTEROL): THREE OR MORE TIMES PER DAY

## 2022-08-22 ASSESSMENT — ACTIVITIES OF DAILY LIVING (ADL): CURRENT_FUNCTION: NO ASSISTANCE NEEDED

## 2022-08-22 NOTE — PROGRESS NOTES
"SUBJECTIVE:   Kalani Rowan is a 71 year old female who presents for Preventive Visit.      Patient has been advised of split billing requirements and indicates understanding: Yes  Are you in the first 12 months of your Medicare coverage?  No    Healthy Habits:     In general, how would you rate your overall health?  Fair    Frequency of exercise:  4-5 days/week    Duration of exercise:  15-30 minutes    Do you usually eat at least 4 servings of fruit and vegetables a day, include whole grains    & fiber and avoid regularly eating high fat or \"junk\" foods?  Yes    Taking medications regularly:  Yes    Medication side effects:  Other    Ability to successfully perform activities of daily living:  No assistance needed    Home Safety:  No safety concerns identified    Hearing Impairment:  Need to ask people to speak up or repeat themselves    In the past 6 months, have you been bothered by leaking of urine?  No    In general, how would you rate your overall mental or emotional health?  Good      PHQ-2 Total Score: 0    Additional concerns today:  Yes         Do you feel safe in your environment? Yes    Have you ever done Advance Care Planning? (For example, a Health Directive, POLST, or a discussion with a medical provider or your loved ones about your wishes): No, advance care planning information given to patient to review.  Patient declined advance care planning discussion at this time.       Fall risk  Fallen 2 or more times in the past year?: No  Any fall with injury in the past year?: No    Cognitive Screening   1) Repeat 3 items (Leader, Season, Table)    2) Clock draw: NORMAL  3) 3 item recall: Recalls 2 objects   Results: NORMAL clock, 1-2 items recalled: COGNITIVE IMPAIRMENT LESS LIKELY    Mini-CogTM Copyright ANGELA Palencia. Licensed by the author for use in [facility]; reprinted with permission (phyllis@.[email or web address]). All rights reserved.      Do you have sleep apnea, excessive snoring or daytime " drowsiness?: no    Reviewed and updated as needed this visit by clinical staff   Tobacco  Allergies  Meds   Med Hx  Surg Hx  Fam Hx  Soc Hx          Reviewed and updated as needed this visit by Provider                   Social History     Tobacco Use     Smoking status: Never Smoker     Smokeless tobacco: Never Used   Substance Use Topics     Alcohol use: No     Alcohol/week: 0.0 standard drinks     If you drink alcohol do you typically have >3 drinks per day or >7 drinks per week? No    Alcohol Use 8/22/2022   Prescreen: >3 drinks/day or >7 drinks/week? No   Prescreen: >3 drinks/day or >7 drinks/week? -               Current providers sharing in care for this patient include:   Patient Care Team:  Lucia Lind MD as PCP - General    The following health maintenance items are reviewed in Epic and correct as of today:  Health Maintenance Due   Topic Date Due     DEXA  Never done     DTAP/TDAP/TD IMMUNIZATION (1 - Tdap) Never done     ZOSTER IMMUNIZATION (1 of 2) Never done     ASTHMA ACTION PLAN  10/28/2014     Pneumococcal Vaccine: 65+ Years (1 - PCV) Never done     MAMMO SCREENING  02/16/2020     COVID-19 Vaccine (2 - Pfizer series) 01/31/2022     BP Readings from Last 3 Encounters:   08/22/22 132/74   08/12/22 (!) 141/65   11/03/19 137/76    Wt Readings from Last 3 Encounters:   08/22/22 56.7 kg (125 lb)   08/12/22 59.4 kg (131 lb)   11/02/19 63.5 kg (140 lb)                       Review of Systems   Constitutional: Positive for chills. Negative for fever.   HENT: Positive for congestion and hearing loss. Negative for ear pain and sore throat.    Eyes: Positive for visual disturbance. Negative for pain.   Respiratory: Positive for cough and shortness of breath.    Cardiovascular: Positive for chest pain. Negative for palpitations and peripheral edema.   Gastrointestinal: Positive for constipation, heartburn and nausea. Negative for abdominal pain, diarrhea and hematochezia.   Breasts:  Positive for  "tenderness. Negative for breast mass and discharge.   Genitourinary: Negative for dysuria, frequency, genital sores, hematuria, pelvic pain, urgency, vaginal bleeding and vaginal discharge.   Musculoskeletal: Positive for arthralgias. Negative for joint swelling and myalgias.   Skin: Positive for rash.   Neurological: Positive for dizziness, weakness and headaches. Negative for paresthesias.   Psychiatric/Behavioral: Negative for mood changes. The patient is nervous/anxious.      She contracted Covid in late July and has had ongoing problems since then with wheezing and dyspnea on exertion. Chest CT was negative for pneumonia and PE. She has had mutiple rounds of PO prednisone which works well but symptoms return as soon as she is off.     OBJECTIVE:   /74   Pulse 111   Temp 98  F (36.7  C) (Oral)   Resp 18   Ht 1.49 m (4' 10.66\")   Wt 56.7 kg (125 lb)   LMP  (LMP Unknown)   SpO2 96%   Breastfeeding No   BMI 25.54 kg/m   Estimated body mass index is 25.54 kg/m  as calculated from the following:    Height as of this encounter: 1.49 m (4' 10.66\").    Weight as of this encounter: 56.7 kg (125 lb).  Physical Exam  GENERAL: healthy, alert and no distress  EYES: Eyes grossly normal to inspection, PERRL and conjunctivae and sclerae normal  HENT: ear canals and TM's normal, nose and mouth without ulcers or lesions  NECK: no adenopathy, no asymmetry, masses, or scars and thyroid normal to palpation  RESP: lungs clear to auscultation - no rales, rhonchi or wheezes  CV: regular rate and rhythm, normal S1 S2, no S3 or S4, no murmur, click or rub, no peripheral edema and peripheral pulses strong  ABDOMEN: soft, nontender, no hepatosplenomegaly, no masses and bowel sounds normal  MS: no gross musculoskeletal defects noted, no edema  SKIN: no suspicious lesions or rashes  NEURO: Normal strength and tone, mentation intact and speech normal  PSYCH: mentation appears normal, affect normal/bright      ASSESSMENT / PLAN: " "  (Z00.00) Preventative health care  (primary encounter diagnosis)  Comment: she has mammogram scheduled   Plan: Lipid Profile (Chol, Trig, HDL, LDL calc), TSH         with free T4 reflex, Comprehensive metabolic         panel (BMP + Alb, Alk Phos, ALT, AST, Total.         Bili, TP)             (U07.1) Infection due to 2019 novel coronavirus  Comment: will recheck Covid test and start  Her on maximal dose Advair  Plan: Symptomatic; Yes; 8/1/2022 COVID-19 Virus         (Coronavirus) by PCR             (J45.20) Mild intermittent asthma, unspecified whether complicated  Comment:    Plan: fluticasone-salmeterol (ADVAIR-HFA) 230-21         MCG/ACT inhaler               Patient has been advised of split billing requirements and indicates understanding: Yes    COUNSELING:  Reviewed preventive health counseling, as reflected in patient instructions       Regular exercise       Healthy diet/nutrition    Estimated body mass index is 25.54 kg/m  as calculated from the following:    Height as of this encounter: 1.49 m (4' 10.66\").    Weight as of this encounter: 56.7 kg (125 lb).        She reports that she has never smoked. She has never used smokeless tobacco.      Appropriate preventive services were discussed with this patient, including applicable screening as appropriate for cardiovascular disease, diabetes, osteopenia/osteoporosis, and glaucoma.  As appropriate for age/gender, discussed screening for colorectal cancer, prostate cancer, breast cancer, and cervical cancer. Checklist reviewing preventive services available has been given to the patient.    Reviewed patients plan of care and provided an AVS. The Basic Care Plan (routine screening as documented in Health Maintenance) for Kalani meets the Care Plan requirement. This Care Plan has been established and reviewed with the Patient.    Counseling Resources:  ATP IV Guidelines  Pooled Cohorts Equation Calculator  Breast Cancer Risk Calculator  Breast Cancer: " Medication to Reduce Risk  FRAX Risk Assessment  ICSI Preventive Guidelines  Dietary Guidelines for Americans, 2010  USDA's MyPlate  ASA Prophylaxis  Lung CA Screening    Lucia Lind MD  [facility]    Identified Health Risks:

## 2022-08-22 NOTE — LETTER
[place] [address] [facility] [address]  [phone]          August 22, 2022    RE:  Kalani Rowan                                                                                                                                                       [address] [place] [address]            To whom it may concern:    Kalani Rowan is under my professional care. She has contracted covid 19 and has significant lung disease from it. She will be too short of breath to travel for several months.     Therefore I request that she be allowed a refund for her September trip coming up.     If you have any questions or if you need additional information in regard to this matter, please feel free to call or contact me at [facility], [address], [place] [address] or phone # [phone].     Sincerely,        Lucia Lind MD

## 2022-08-23 LAB — SARS-COV-2 RNA RESP QL NAA+PROBE: NEGATIVE

## 2022-08-25 ENCOUNTER — TELEPHONE (OUTPATIENT)
Dept: INTERNAL MEDICINE | Facility: CLINIC | Age: 72
End: 2022-08-25

## 2022-08-25 NOTE — TELEPHONE ENCOUNTER
Pt calling about covid results. Pt advised of results negative. Patient stated an understanding and agreed with plan.  Morgan MEIER RN   [facility]

## 2022-08-31 ENCOUNTER — HOSPITAL ENCOUNTER (INPATIENT)
Facility: CLINIC | Age: 72
LOS: 9 days | Discharge: HOME HEALTH | DRG: 234 | End: 2022-09-09
Attending: EMERGENCY MEDICINE | Admitting: INTERNAL MEDICINE
Payer: COMMERCIAL

## 2022-08-31 ENCOUNTER — APPOINTMENT (OUTPATIENT)
Dept: GENERAL RADIOLOGY | Facility: CLINIC | Age: 72
DRG: 234 | End: 2022-08-31
Attending: EMERGENCY MEDICINE
Payer: COMMERCIAL

## 2022-08-31 DIAGNOSIS — Z95.1 S/P CABG (CORONARY ARTERY BYPASS GRAFT): Primary | ICD-10-CM

## 2022-08-31 DIAGNOSIS — I21.4 NSTEMI (NON-ST ELEVATED MYOCARDIAL INFARCTION) (H): ICD-10-CM

## 2022-08-31 LAB
ALBUMIN SERPL-MCNC: 3.1 G/DL (ref 3.4–5)
ALP SERPL-CCNC: 92 U/L (ref 40–150)
ALT SERPL W P-5'-P-CCNC: 36 U/L (ref 0–50)
ANION GAP SERPL CALCULATED.3IONS-SCNC: 10 MMOL/L (ref 3–14)
AST SERPL W P-5'-P-CCNC: 13 U/L (ref 0–45)
ATRIAL RATE - MUSE: 76 BPM
ATRIAL RATE - MUSE: 86 BPM
BASOPHILS # BLD AUTO: 0 10E3/UL (ref 0–0.2)
BASOPHILS NFR BLD AUTO: 0 %
BILIRUB DIRECT SERPL-MCNC: <0.1 MG/DL (ref 0–0.2)
BILIRUB SERPL-MCNC: 0.2 MG/DL (ref 0.2–1.3)
BUN SERPL-MCNC: 22 MG/DL (ref 7–30)
CALCIUM SERPL-MCNC: 9 MG/DL (ref 8.5–10.1)
CHLORIDE BLD-SCNC: 99 MMOL/L (ref 94–109)
CO2 SERPL-SCNC: 23 MMOL/L (ref 20–32)
CREAT SERPL-MCNC: 0.58 MG/DL (ref 0.52–1.04)
DIASTOLIC BLOOD PRESSURE - MUSE: NORMAL MMHG
DIASTOLIC BLOOD PRESSURE - MUSE: NORMAL MMHG
EOSINOPHIL # BLD AUTO: 0 10E3/UL (ref 0–0.7)
EOSINOPHIL NFR BLD AUTO: 0 %
ERYTHROCYTE [DISTWIDTH] IN BLOOD BY AUTOMATED COUNT: 12.6 % (ref 10–15)
GFR SERPL CREATININE-BSD FRML MDRD: >90 ML/MIN/1.73M2
GLUCOSE BLD-MCNC: 163 MG/DL (ref 70–99)
HCT VFR BLD AUTO: 42 % (ref 35–47)
HGB BLD-MCNC: 13.7 G/DL (ref 11.7–15.7)
HOLD SPECIMEN: NORMAL
IMM GRANULOCYTES # BLD: 0.1 10E3/UL
IMM GRANULOCYTES NFR BLD: 0 %
INTERPRETATION ECG - MUSE: NORMAL
INTERPRETATION ECG - MUSE: NORMAL
LIPASE SERPL-CCNC: 310 U/L (ref 73–393)
LYMPHOCYTES # BLD AUTO: 1.1 10E3/UL (ref 0.8–5.3)
LYMPHOCYTES NFR BLD AUTO: 8 %
MCH RBC QN AUTO: 29.1 PG (ref 26.5–33)
MCHC RBC AUTO-ENTMCNC: 32.6 G/DL (ref 31.5–36.5)
MCV RBC AUTO: 89 FL (ref 78–100)
MONOCYTES # BLD AUTO: 0.3 10E3/UL (ref 0–1.3)
MONOCYTES NFR BLD AUTO: 3 %
NEUTROPHILS # BLD AUTO: 11.9 10E3/UL (ref 1.6–8.3)
NEUTROPHILS NFR BLD AUTO: 89 %
NRBC # BLD AUTO: 0 10E3/UL
NRBC BLD AUTO-RTO: 0 /100
P AXIS - MUSE: 68 DEGREES
P AXIS - MUSE: 73 DEGREES
PLATELET # BLD AUTO: 336 10E3/UL (ref 150–450)
POTASSIUM BLD-SCNC: 3.9 MMOL/L (ref 3.4–5.3)
PR INTERVAL - MUSE: 160 MS
PR INTERVAL - MUSE: 170 MS
PROT SERPL-MCNC: 6.5 G/DL (ref 6.8–8.8)
QRS DURATION - MUSE: 74 MS
QRS DURATION - MUSE: 78 MS
QT - MUSE: 364 MS
QT - MUSE: 370 MS
QTC - MUSE: 416 MS
QTC - MUSE: 435 MS
R AXIS - MUSE: -5 DEGREES
R AXIS - MUSE: 15 DEGREES
RBC # BLD AUTO: 4.7 10E6/UL (ref 3.8–5.2)
SARS-COV-2 RNA RESP QL NAA+PROBE: NEGATIVE
SODIUM SERPL-SCNC: 132 MMOL/L (ref 133–144)
SYSTOLIC BLOOD PRESSURE - MUSE: NORMAL MMHG
SYSTOLIC BLOOD PRESSURE - MUSE: NORMAL MMHG
T AXIS - MUSE: 10 DEGREES
T AXIS - MUSE: 17 DEGREES
TROPONIN I SERPL HS-MCNC: 165 NG/L
VENTRICULAR RATE- MUSE: 76 BPM
VENTRICULAR RATE- MUSE: 86 BPM
WBC # BLD AUTO: 13.4 10E3/UL (ref 4–11)

## 2022-08-31 PROCEDURE — 99223 1ST HOSP IP/OBS HIGH 75: CPT | Mod: AI | Performed by: INTERNAL MEDICINE

## 2022-08-31 PROCEDURE — 36415 COLL VENOUS BLD VENIPUNCTURE: CPT | Performed by: EMERGENCY MEDICINE

## 2022-08-31 PROCEDURE — 71046 X-RAY EXAM CHEST 2 VIEWS: CPT

## 2022-08-31 PROCEDURE — 83690 ASSAY OF LIPASE: CPT | Performed by: EMERGENCY MEDICINE

## 2022-08-31 PROCEDURE — 96376 TX/PRO/DX INJ SAME DRUG ADON: CPT

## 2022-08-31 PROCEDURE — U0005 INFEC AGEN DETEC AMPLI PROBE: HCPCS | Performed by: EMERGENCY MEDICINE

## 2022-08-31 PROCEDURE — 96366 THER/PROPH/DIAG IV INF ADDON: CPT

## 2022-08-31 PROCEDURE — 210N000002 HC R&B HEART CARE

## 2022-08-31 PROCEDURE — 80053 COMPREHEN METABOLIC PANEL: CPT | Performed by: EMERGENCY MEDICINE

## 2022-08-31 PROCEDURE — 96365 THER/PROPH/DIAG IV INF INIT: CPT

## 2022-08-31 PROCEDURE — 250N000013 HC RX MED GY IP 250 OP 250 PS 637: Performed by: EMERGENCY MEDICINE

## 2022-08-31 PROCEDURE — 99291 CRITICAL CARE FIRST HOUR: CPT | Mod: 25

## 2022-08-31 PROCEDURE — 250N000011 HC RX IP 250 OP 636: Performed by: EMERGENCY MEDICINE

## 2022-08-31 PROCEDURE — 93005 ELECTROCARDIOGRAM TRACING: CPT | Mod: 76

## 2022-08-31 PROCEDURE — 85018 HEMOGLOBIN: CPT | Performed by: EMERGENCY MEDICINE

## 2022-08-31 PROCEDURE — 82248 BILIRUBIN DIRECT: CPT | Performed by: EMERGENCY MEDICINE

## 2022-08-31 PROCEDURE — 93005 ELECTROCARDIOGRAM TRACING: CPT

## 2022-08-31 PROCEDURE — 84484 ASSAY OF TROPONIN QUANT: CPT | Performed by: EMERGENCY MEDICINE

## 2022-08-31 PROCEDURE — C9803 HOPD COVID-19 SPEC COLLECT: HCPCS

## 2022-08-31 RX ORDER — HEPARIN SODIUM 10000 [USP'U]/100ML
0-5000 INJECTION, SOLUTION INTRAVENOUS CONTINUOUS
Status: DISCONTINUED | OUTPATIENT
Start: 2022-08-31 | End: 2022-09-01

## 2022-08-31 RX ORDER — NITROGLYCERIN 0.4 MG/1
0.4 TABLET SUBLINGUAL EVERY 5 MIN PRN
Status: DISCONTINUED | OUTPATIENT
Start: 2022-08-31 | End: 2022-09-02

## 2022-08-31 RX ORDER — ASPIRIN 81 MG/1
324 TABLET, CHEWABLE ORAL ONCE
Status: COMPLETED | OUTPATIENT
Start: 2022-08-31 | End: 2022-08-31

## 2022-08-31 RX ORDER — NITROGLYCERIN 20 MG/100ML
10-200 INJECTION INTRAVENOUS CONTINUOUS
Status: DISCONTINUED | OUTPATIENT
Start: 2022-08-31 | End: 2022-09-01

## 2022-08-31 RX ORDER — PREDNISONE 10 MG/1
TABLET ORAL 2 TIMES DAILY
Status: ON HOLD | COMMUNITY
End: 2022-09-09

## 2022-08-31 RX ORDER — NITROGLYCERIN 80 MG/1
1 PATCH TRANSDERMAL ONCE
Status: DISCONTINUED | OUTPATIENT
Start: 2022-08-31 | End: 2022-08-31

## 2022-08-31 RX ORDER — ALBUTEROL SULFATE 0.83 MG/ML
2.5 SOLUTION RESPIRATORY (INHALATION) EVERY 6 HOURS PRN
Status: ON HOLD | COMMUNITY
End: 2022-09-09

## 2022-08-31 RX ADMIN — HEPARIN SODIUM 700 UNITS/HR: 10000 INJECTION, SOLUTION INTRAVENOUS at 22:18

## 2022-08-31 RX ADMIN — ASPIRIN 81 MG CHEWABLE TABLET 324 MG: 81 TABLET CHEWABLE at 21:41

## 2022-08-31 RX ADMIN — NITROGLYCERIN 0.4 MG: 0.4 TABLET SUBLINGUAL at 22:03

## 2022-08-31 RX ADMIN — NITROGLYCERIN 10 MCG/MIN: 20 INJECTION INTRAVENOUS at 22:07

## 2022-08-31 ASSESSMENT — ACTIVITIES OF DAILY LIVING (ADL)
ADLS_ACUITY_SCORE: 35
ADLS_ACUITY_SCORE: 35

## 2022-08-31 ASSESSMENT — ENCOUNTER SYMPTOMS
NAUSEA: 1
VOMITING: 0

## 2022-08-31 NOTE — Clinical Note
Hemodynamic equipment used: 5 lead ECG, YamsaferK With 3 Leads, Machine BP Cuff and pulse oximeter probe.

## 2022-08-31 NOTE — Clinical Note
3/14/2017      Mariella Rodriguez  [address] [facility] [address]    Dear Ms. Rodriguez,    Your procedure is scheduled with Dr Irwin Rosado on April 10, 2017 at 7:00 am at:    [facility]  [address] [place], [place] [id number]    Please register at [facility] on April 10, 2017 by 6:00 am       You can expect to be contacted 1 to 3 days prior to the surgery to confirm arrival and surgery time. These times may change due to various OR schedule needs. We will call you ASAP if this happens.    The following appointment(s) have been scheduled for you:     · Post-op with Dr Mosher at the [facility], [address] ([facility]) on April 24, 2017 at 1:10 pm    To better prepare for your surgery, please follow these instructions:    · PLEASE SCHEDULE A PREOPERATIVE APPOINTMENT WITH YOUR PRIMARY CARE PROVIDER FOR SURGICAL CLEARANCE TO TAKE PLACE 2-3 WEEKS PRIOR TO SURGERY    · Do not take any anti--inflammatory medications (aspirin, ibuprofen, naproxen, etc) for 7 days before surgery.  Acetaminophen (Tylenol) is acceptable.    · Do not eat or drink after midnight the night before your surgery.    · You will need someone to drive you home and remain with you up to 24 hours after you have been discharged.  ·     Our PreAuthorization Team will be contacting your insurance company to ensure that they have all of the information they need from us.  We will let you know if we encounter any issues or have any concerns in advance of your scheduled surgery.  If you have any  questions regarding your surgery authorization, please check with your insurance company or call our PreAut Inquiry Line at [phone]. (Please see attached for more information.)                If you have any work related and/or disability forms that need to be completed, please contact the Forms Completion Department at [phone]. Forms can be dropped off at any of our [facility]  Prepped: right groin and left radial.  locations. Please be advised that it can take 7 to 10 business days to complete these requests.    If you have questions regarding the procedure, medications, rehab, etc., please contact the nursing staff at [facility] - scheduling line at [phone].    If you have any scheduling questions or need to reschedule, please contact me at the telephone number and extension listed below.       Thank you,        Joycelyn at [phone].  Surgery Scheduler for Dr [facility]          \"Help us grow our quality of service. We want to improve - and you can help us. You may receive a survey in the mail. This is your opportunity to tell us what we did well, and where we could use some improvement. We value your input.\"                                                        Insurance Authorization Need to Know’s    Prior to your surgical procedure, our team will contact your Insurance Company to initiate a PreAuthorization request.      This is not a guarantee of payment from your insurance company, but rather a step taken to ensure that we have all of the information and documentation for them to confirm the procedure is one that is eligible for coverage under your plan.    We will contact you if we either need more information from you to fulfill the requirements of your insurance company, or if we need to discuss any concerns that may lead to postponement or cancellation of your procedure.     What to do if… My Insurance Changes:  If, at any time, your insurance company, plan or even card changes… Please call our office so that our team can be sure to update your records.  We will need to make sure to submit any PreAuth or alcon to the correct, up-to-date insurance plan.      What to do if… My Insurance Requires A Referral:  If your insurance company requires a Referral for Specialty Care or to see a Specialist, you will need to confirm with them if you have one on file.    - If your insurance  carrier does not have a referral, then you will need to contact your Primary Care Physician to have one directly submitted to your insurance company ASAP.    - Without a referral on file, your insurance company will not Pre-Authorize your surgery and may not cover any of your care with our specialty.    What to do if… I have a Work Comp (W/C) Claim:  If you have a W/C claim, please be sure to provide our reception team with the information you have regarding your claim ASAP.  We will contact your W/C carrier/adjustor to inform them of your upcoming surgery and check the status of your claim (open vs closed).  We will let you know if they advise of any concerns or issues with your claim.  - Even if you have an open W/C claim, please also provide us with your personal/family insurance.  We will want to be sure this plan is loaded into your account.  We always PreAuth with personal insurance as a back-up to W/C.  Otherwise, if W/C doesn’t cover something along the way, you will receive a bill for the services.    What to do if… I have Month-to-Month Coverage/Premiums:  If you have an insurance plan that is paid for month to month, or is subject to plan change on a monthly basis, please be aware we cannot initiate PreAuth until just before the month of your surgery, as your insurance company will need to verify your premium payments/eligibility first.    What to do if… I Do Not Have Insurance Coverage:  If you do not have insurance coverage, please call our Patient Contact Center:  [phone] or a  at the hospital to discuss possible coverage options and/or billing options.     What to do if… I have other Insurance/Billing questions:If you have questions regarding our billing process, setting up payment plans, our fee schedule, etc… Please call our Patient Contact Center:  [phone].    If you need information regarding your level of benefits or out-of-pocket expenses, please contact your  insurance company directly.  They can also confirm for you whether or not we (the surgeon and the hospital/surgery center) are in your plan’s preferred network (aka ‘in-network’).

## 2022-09-01 ENCOUNTER — PREP FOR PROCEDURE (OUTPATIENT)
Dept: CARDIOLOGY | Facility: CLINIC | Age: 72
End: 2022-09-01

## 2022-09-01 ENCOUNTER — ANESTHESIA EVENT (OUTPATIENT)
Dept: SURGERY | Facility: CLINIC | Age: 72
DRG: 234 | End: 2022-09-01
Payer: COMMERCIAL

## 2022-09-01 ENCOUNTER — APPOINTMENT (OUTPATIENT)
Dept: ULTRASOUND IMAGING | Facility: CLINIC | Age: 72
DRG: 234 | End: 2022-09-01
Attending: THORACIC SURGERY (CARDIOTHORACIC VASCULAR SURGERY)
Payer: COMMERCIAL

## 2022-09-01 ENCOUNTER — APPOINTMENT (OUTPATIENT)
Dept: CARDIOLOGY | Facility: CLINIC | Age: 72
DRG: 234 | End: 2022-09-01
Attending: INTERNAL MEDICINE
Payer: COMMERCIAL

## 2022-09-01 ENCOUNTER — APPOINTMENT (OUTPATIENT)
Dept: CT IMAGING | Facility: CLINIC | Age: 72
DRG: 234 | End: 2022-09-01
Attending: THORACIC SURGERY (CARDIOTHORACIC VASCULAR SURGERY)
Payer: COMMERCIAL

## 2022-09-01 DIAGNOSIS — I25.10 CAD (CORONARY ARTERY DISEASE): Primary | ICD-10-CM

## 2022-09-01 LAB
ABO/RH(D): NORMAL
ALBUMIN UR-MCNC: NEGATIVE MG/DL
ANTIBODY SCREEN: NEGATIVE
APPEARANCE UR: CLEAR
ATRIAL RATE - MUSE: 77 BPM
ATRIAL RATE - MUSE: 78 BPM
BILIRUB UR QL STRIP: NEGATIVE
CHOLEST SERPL-MCNC: 258 MG/DL
COLOR UR AUTO: NORMAL
DIASTOLIC BLOOD PRESSURE - MUSE: NORMAL MMHG
DIASTOLIC BLOOD PRESSURE - MUSE: NORMAL MMHG
GLUCOSE UR STRIP-MCNC: NEGATIVE MG/DL
HDLC SERPL-MCNC: 65 MG/DL
HGB UR QL STRIP: NEGATIVE
HOLD SPECIMEN: NORMAL
INTERPRETATION ECG - MUSE: NORMAL
INTERPRETATION ECG - MUSE: NORMAL
KETONES UR STRIP-MCNC: NEGATIVE MG/DL
LDLC SERPL CALC-MCNC: 172 MG/DL
LEUKOCYTE ESTERASE UR QL STRIP: NEGATIVE
LVEF ECHO: NORMAL
NITRATE UR QL: NEGATIVE
NONHDLC SERPL-MCNC: 193 MG/DL
P AXIS - MUSE: 70 DEGREES
P AXIS - MUSE: 78 DEGREES
PH UR STRIP: 6.5 [PH] (ref 5–7)
PR INTERVAL - MUSE: 166 MS
PR INTERVAL - MUSE: 188 MS
QRS DURATION - MUSE: 74 MS
QRS DURATION - MUSE: 84 MS
QT - MUSE: 384 MS
QT - MUSE: 388 MS
QTC - MUSE: 437 MS
QTC - MUSE: 439 MS
R AXIS - MUSE: -11 DEGREES
R AXIS - MUSE: 13 DEGREES
SP GR UR STRIP: 1.02 (ref 1–1.03)
SPECIMEN EXPIRATION DATE: NORMAL
SYSTOLIC BLOOD PRESSURE - MUSE: NORMAL MMHG
SYSTOLIC BLOOD PRESSURE - MUSE: NORMAL MMHG
T AXIS - MUSE: 10 DEGREES
T AXIS - MUSE: 16 DEGREES
TRIGL SERPL-MCNC: 103 MG/DL
TROPONIN I SERPL HS-MCNC: 4119 NG/L
TROPONIN I SERPL HS-MCNC: ABNORMAL NG/L
UFH PPP CHRO-ACNC: 0.91 IU/ML
UFH PPP CHRO-ACNC: >1.1 IU/ML
UROBILINOGEN UR STRIP-MCNC: NORMAL MG/DL
VENTRICULAR RATE- MUSE: 77 BPM
VENTRICULAR RATE- MUSE: 78 BPM

## 2022-09-01 PROCEDURE — 99152 MOD SED SAME PHYS/QHP 5/>YRS: CPT | Mod: GC | Performed by: INTERNAL MEDICINE

## 2022-09-01 PROCEDURE — 93458 L HRT ARTERY/VENTRICLE ANGIO: CPT | Mod: 26 | Performed by: INTERNAL MEDICINE

## 2022-09-01 PROCEDURE — 81003 URINALYSIS AUTO W/O SCOPE: CPT | Performed by: THORACIC SURGERY (CARDIOTHORACIC VASCULAR SURGERY)

## 2022-09-01 PROCEDURE — 99153 MOD SED SAME PHYS/QHP EA: CPT | Performed by: INTERNAL MEDICINE

## 2022-09-01 PROCEDURE — 93458 L HRT ARTERY/VENTRICLE ANGIO: CPT | Performed by: INTERNAL MEDICINE

## 2022-09-01 PROCEDURE — 99232 SBSQ HOSP IP/OBS MODERATE 35: CPT | Performed by: HOSPITALIST

## 2022-09-01 PROCEDURE — 84484 ASSAY OF TROPONIN QUANT: CPT | Performed by: INTERNAL MEDICINE

## 2022-09-01 PROCEDURE — 36415 COLL VENOUS BLD VENIPUNCTURE: CPT | Performed by: THORACIC SURGERY (CARDIOTHORACIC VASCULAR SURGERY)

## 2022-09-01 PROCEDURE — 93970 EXTREMITY STUDY: CPT

## 2022-09-01 PROCEDURE — 99222 1ST HOSP IP/OBS MODERATE 55: CPT | Mod: 57 | Performed by: THORACIC SURGERY (CARDIOTHORACIC VASCULAR SURGERY)

## 2022-09-01 PROCEDURE — 85520 HEPARIN ASSAY: CPT | Performed by: INTERNAL MEDICINE

## 2022-09-01 PROCEDURE — 86901 BLOOD TYPING SEROLOGIC RH(D): CPT | Performed by: THORACIC SURGERY (CARDIOTHORACIC VASCULAR SURGERY)

## 2022-09-01 PROCEDURE — 80061 LIPID PANEL: CPT | Performed by: INTERNAL MEDICINE

## 2022-09-01 PROCEDURE — 258N000003 HC RX IP 258 OP 636: Performed by: INTERNAL MEDICINE

## 2022-09-01 PROCEDURE — 99223 1ST HOSP IP/OBS HIGH 75: CPT | Mod: 25 | Performed by: INTERNAL MEDICINE

## 2022-09-01 PROCEDURE — C1769 GUIDE WIRE: HCPCS | Performed by: INTERNAL MEDICINE

## 2022-09-01 PROCEDURE — 250N000013 HC RX MED GY IP 250 OP 250 PS 637: Performed by: INTERNAL MEDICINE

## 2022-09-01 PROCEDURE — 93005 ELECTROCARDIOGRAM TRACING: CPT

## 2022-09-01 PROCEDURE — 250N000011 HC RX IP 250 OP 636: Performed by: INTERNAL MEDICINE

## 2022-09-01 PROCEDURE — 99152 MOD SED SAME PHYS/QHP 5/>YRS: CPT | Performed by: INTERNAL MEDICINE

## 2022-09-01 PROCEDURE — 255N000002 HC RX 255 OP 636: Performed by: INTERNAL MEDICINE

## 2022-09-01 PROCEDURE — 86850 RBC ANTIBODY SCREEN: CPT | Performed by: THORACIC SURGERY (CARDIOTHORACIC VASCULAR SURGERY)

## 2022-09-01 PROCEDURE — 36415 COLL VENOUS BLD VENIPUNCTURE: CPT | Performed by: INTERNAL MEDICINE

## 2022-09-01 PROCEDURE — 86923 COMPATIBILITY TEST ELECTRIC: CPT | Performed by: THORACIC SURGERY (CARDIOTHORACIC VASCULAR SURGERY)

## 2022-09-01 PROCEDURE — 250N000009 HC RX 250: Performed by: INTERNAL MEDICINE

## 2022-09-01 PROCEDURE — 999N000208 ECHOCARDIOGRAM COMPLETE

## 2022-09-01 PROCEDURE — B2111ZZ FLUOROSCOPY OF MULTIPLE CORONARY ARTERIES USING LOW OSMOLAR CONTRAST: ICD-10-PCS | Performed by: INTERNAL MEDICINE

## 2022-09-01 PROCEDURE — 250N000011 HC RX IP 250 OP 636: Performed by: NURSE PRACTITIONER

## 2022-09-01 PROCEDURE — 4A023N7 MEASUREMENT OF CARDIAC SAMPLING AND PRESSURE, LEFT HEART, PERCUTANEOUS APPROACH: ICD-10-PCS | Performed by: INTERNAL MEDICINE

## 2022-09-01 PROCEDURE — 999N000157 HC STATISTIC RCP TIME EA 10 MIN

## 2022-09-01 PROCEDURE — 272N000001 HC OR GENERAL SUPPLY STERILE: Performed by: INTERNAL MEDICINE

## 2022-09-01 PROCEDURE — C1894 INTRO/SHEATH, NON-LASER: HCPCS | Performed by: INTERNAL MEDICINE

## 2022-09-01 PROCEDURE — 93306 TTE W/DOPPLER COMPLETE: CPT | Mod: 26 | Performed by: INTERNAL MEDICINE

## 2022-09-01 PROCEDURE — 93880 EXTRACRANIAL BILAT STUDY: CPT

## 2022-09-01 PROCEDURE — 210N000002 HC R&B HEART CARE

## 2022-09-01 PROCEDURE — 250N000012 HC RX MED GY IP 250 OP 636 PS 637: Performed by: INTERNAL MEDICINE

## 2022-09-01 PROCEDURE — 71250 CT THORAX DX C-: CPT

## 2022-09-01 RX ORDER — SODIUM CHLORIDE 9 MG/ML
INJECTION, SOLUTION INTRAVENOUS CONTINUOUS
Status: DISCONTINUED | OUTPATIENT
Start: 2022-09-01 | End: 2022-09-01 | Stop reason: HOSPADM

## 2022-09-01 RX ORDER — ONDANSETRON 4 MG/1
4 TABLET, ORALLY DISINTEGRATING ORAL EVERY 6 HOURS PRN
Status: DISCONTINUED | OUTPATIENT
Start: 2022-09-01 | End: 2022-09-02

## 2022-09-01 RX ORDER — ASPIRIN 81 MG/1
81 TABLET ORAL DAILY
Status: DISCONTINUED | OUTPATIENT
Start: 2022-09-01 | End: 2022-09-02

## 2022-09-01 RX ORDER — VERAPAMIL HYDROCHLORIDE 2.5 MG/ML
INJECTION, SOLUTION INTRAVENOUS
Status: DISCONTINUED | OUTPATIENT
Start: 2022-09-01 | End: 2022-09-01 | Stop reason: HOSPADM

## 2022-09-01 RX ORDER — HEPARIN SODIUM 10000 [USP'U]/100ML
0-5000 INJECTION, SOLUTION INTRAVENOUS CONTINUOUS
Status: DISCONTINUED | OUTPATIENT
Start: 2022-09-01 | End: 2022-09-02

## 2022-09-01 RX ORDER — AMOXICILLIN 250 MG
2 CAPSULE ORAL 2 TIMES DAILY PRN
Status: DISCONTINUED | OUTPATIENT
Start: 2022-09-01 | End: 2022-09-02

## 2022-09-01 RX ORDER — ATORVASTATIN CALCIUM 40 MG/1
40 TABLET, FILM COATED ORAL EVERY EVENING
Status: DISCONTINUED | OUTPATIENT
Start: 2022-09-01 | End: 2022-09-01

## 2022-09-01 RX ORDER — LORAZEPAM 0.5 MG/1
0.5 TABLET ORAL
Status: DISCONTINUED | OUTPATIENT
Start: 2022-09-01 | End: 2022-09-01 | Stop reason: HOSPADM

## 2022-09-01 RX ORDER — ONDANSETRON 2 MG/ML
4 INJECTION INTRAMUSCULAR; INTRAVENOUS EVERY 6 HOURS PRN
Status: DISCONTINUED | OUTPATIENT
Start: 2022-09-01 | End: 2022-09-02

## 2022-09-01 RX ORDER — ACETAMINOPHEN 650 MG/1
650 SUPPOSITORY RECTAL EVERY 6 HOURS PRN
Status: DISCONTINUED | OUTPATIENT
Start: 2022-09-01 | End: 2022-09-02

## 2022-09-01 RX ORDER — HEPARIN SODIUM 1000 [USP'U]/ML
INJECTION, SOLUTION INTRAVENOUS; SUBCUTANEOUS
Status: DISCONTINUED | OUTPATIENT
Start: 2022-09-01 | End: 2022-09-01 | Stop reason: HOSPADM

## 2022-09-01 RX ORDER — LISINOPRIL 5 MG/1
5 TABLET ORAL DAILY
Status: DISCONTINUED | OUTPATIENT
Start: 2022-09-01 | End: 2022-09-01

## 2022-09-01 RX ORDER — ASPIRIN 81 MG/1
243 TABLET, CHEWABLE ORAL ONCE
Status: COMPLETED | OUTPATIENT
Start: 2022-09-01 | End: 2022-09-01

## 2022-09-01 RX ORDER — POTASSIUM CHLORIDE 1500 MG/1
20 TABLET, EXTENDED RELEASE ORAL
Status: DISCONTINUED | OUTPATIENT
Start: 2022-09-01 | End: 2022-09-01 | Stop reason: HOSPADM

## 2022-09-01 RX ORDER — ATORVASTATIN CALCIUM 40 MG/1
40 TABLET, FILM COATED ORAL DAILY
Status: DISCONTINUED | OUTPATIENT
Start: 2022-09-01 | End: 2022-09-01

## 2022-09-01 RX ORDER — LIDOCAINE 40 MG/G
CREAM TOPICAL
Status: DISCONTINUED | OUTPATIENT
Start: 2022-09-01 | End: 2022-09-01

## 2022-09-01 RX ORDER — SODIUM CHLORIDE 9 MG/ML
INJECTION, SOLUTION INTRAVENOUS CONTINUOUS
Status: DISCONTINUED | OUTPATIENT
Start: 2022-09-01 | End: 2022-09-02

## 2022-09-01 RX ORDER — METOPROLOL TARTRATE 25 MG/1
25 TABLET, FILM COATED ORAL 2 TIMES DAILY
Status: DISCONTINUED | OUTPATIENT
Start: 2022-09-01 | End: 2022-09-01

## 2022-09-01 RX ORDER — ATORVASTATIN CALCIUM 80 MG/1
80 TABLET, FILM COATED ORAL EVERY EVENING
Status: DISCONTINUED | OUTPATIENT
Start: 2022-09-01 | End: 2022-09-02

## 2022-09-01 RX ORDER — POLYETHYLENE GLYCOL 3350 17 G/17G
17 POWDER, FOR SOLUTION ORAL DAILY PRN
Status: DISCONTINUED | OUTPATIENT
Start: 2022-09-01 | End: 2022-09-02

## 2022-09-01 RX ORDER — SODIUM CHLORIDE 9 MG/ML
75 INJECTION, SOLUTION INTRAVENOUS CONTINUOUS
Status: ACTIVE | OUTPATIENT
Start: 2022-09-01 | End: 2022-09-01

## 2022-09-01 RX ORDER — FENTANYL CITRATE 50 UG/ML
INJECTION, SOLUTION INTRAMUSCULAR; INTRAVENOUS
Status: DISCONTINUED | OUTPATIENT
Start: 2022-09-01 | End: 2022-09-01 | Stop reason: HOSPADM

## 2022-09-01 RX ORDER — NITROGLYCERIN 5 MG/ML
VIAL (ML) INTRAVENOUS
Status: DISCONTINUED | OUTPATIENT
Start: 2022-09-01 | End: 2022-09-01 | Stop reason: HOSPADM

## 2022-09-01 RX ORDER — ASPIRIN 325 MG
325 TABLET ORAL ONCE
Status: COMPLETED | OUTPATIENT
Start: 2022-09-01 | End: 2022-09-01

## 2022-09-01 RX ORDER — ALBUTEROL SULFATE 0.83 MG/ML
2.5 SOLUTION RESPIRATORY (INHALATION) EVERY 6 HOURS PRN
Status: DISCONTINUED | OUTPATIENT
Start: 2022-09-01 | End: 2022-09-02

## 2022-09-01 RX ORDER — LIDOCAINE 40 MG/G
CREAM TOPICAL
Status: DISCONTINUED | OUTPATIENT
Start: 2022-09-01 | End: 2022-09-02

## 2022-09-01 RX ORDER — LISINOPRIL 10 MG/1
10 TABLET ORAL DAILY
Status: DISCONTINUED | OUTPATIENT
Start: 2022-09-01 | End: 2022-09-02

## 2022-09-01 RX ORDER — AMOXICILLIN 250 MG
1 CAPSULE ORAL 2 TIMES DAILY PRN
Status: DISCONTINUED | OUTPATIENT
Start: 2022-09-01 | End: 2022-09-02

## 2022-09-01 RX ORDER — METOPROLOL TARTRATE 25 MG/1
25 TABLET, FILM COATED ORAL 2 TIMES DAILY
Status: DISCONTINUED | OUTPATIENT
Start: 2022-09-01 | End: 2022-09-02

## 2022-09-01 RX ORDER — ACETAMINOPHEN 325 MG/1
650 TABLET ORAL EVERY 6 HOURS PRN
Status: DISCONTINUED | OUTPATIENT
Start: 2022-09-01 | End: 2022-09-02

## 2022-09-01 RX ORDER — LORAZEPAM 2 MG/ML
0.5 INJECTION INTRAMUSCULAR
Status: DISCONTINUED | OUTPATIENT
Start: 2022-09-01 | End: 2022-09-01 | Stop reason: HOSPADM

## 2022-09-01 RX ADMIN — LISINOPRIL 10 MG: 10 TABLET ORAL at 08:59

## 2022-09-01 RX ADMIN — ATORVASTATIN CALCIUM 80 MG: 80 TABLET, FILM COATED ORAL at 21:03

## 2022-09-01 RX ADMIN — ACETAMINOPHEN 650 MG: 325 TABLET ORAL at 01:26

## 2022-09-01 RX ADMIN — HUMAN ALBUMIN MICROSPHERES AND PERFLUTREN 9 ML: 10; .22 INJECTION, SOLUTION INTRAVENOUS at 08:36

## 2022-09-01 RX ADMIN — METOPROLOL TARTRATE 25 MG: 25 TABLET, FILM COATED ORAL at 00:26

## 2022-09-01 RX ADMIN — PREDNISONE 5 MG: 5 TABLET ORAL at 09:00

## 2022-09-01 RX ADMIN — ACETAMINOPHEN 650 MG: 325 TABLET ORAL at 07:58

## 2022-09-01 RX ADMIN — SODIUM CHLORIDE: 9 INJECTION, SOLUTION INTRAVENOUS at 01:33

## 2022-09-01 RX ADMIN — METOPROLOL TARTRATE 25 MG: 25 TABLET, FILM COATED ORAL at 09:00

## 2022-09-01 RX ADMIN — ASPIRIN 325 MG ORAL TABLET 325 MG: 325 PILL ORAL at 09:28

## 2022-09-01 RX ADMIN — HEPARIN SODIUM 400 UNITS/HR: 10000 INJECTION, SOLUTION INTRAVENOUS at 21:30

## 2022-09-01 RX ADMIN — NITROGLYCERIN 30 MCG/MIN: 20 INJECTION INTRAVENOUS at 04:58

## 2022-09-01 ASSESSMENT — ACTIVITIES OF DAILY LIVING (ADL)
ADLS_ACUITY_SCORE: 18
ADLS_ACUITY_SCORE: 18
ADLS_ACUITY_SCORE: 35
ADLS_ACUITY_SCORE: 18
ADLS_ACUITY_SCORE: 35
ADLS_ACUITY_SCORE: 31
ADLS_ACUITY_SCORE: 31

## 2022-09-01 NOTE — PROGRESS NOTES
[facility]  Medicine Progress Note - Hospitalist Service    Date of Admission:  8/31/2022    Assessment & Plan            Kalani Rowan is a 71 year old female COVID 19 infection 8/9/22, recovered, asthma, HTN, HLD presented to the ER with  chest pain.  Admitted 8/31 for management of NSTEMI.      NSTEMI  Syncopal episode  HTN  HLD  Onset of chest pain and nausea evening of admission. Tried tums but no help, 's NTG appeared to help. Went to bathroom and had syncopal episode after NTG, no head trauma. Mildly hypertensive in ED, otherwise VSS. Initial troponin 163. WBC 13.4 (steroids). CXR clear. EKG nonspecific t-wave changes. Concerning for ACS. Syncope suspect 2/2 NTG (her BP dropped in ED with NTG) vs arrhythmia.   - continue with heparin and nitroglycerine drip, minimal chest discomfort.  - Continue with aspirin 81 mg daily  - BB, ACEI, statin initiated.  FLP Noted, [id number]  - TTE pending  - cardiology consulted, plan is angiogram today.       COVID-19 7/2022  Asthma  [albuterol prn, advair 2 puffs BID, prednisone taper]  Persistent respiratory sx since COVID infection. Recent imaging with CT chest unremarkable. Patient states she has lots of allergies and allergy induced asthma.    Recently placed on prednisone taper which she thinks is helping.   - resumed PTA inhalers  -  On tapering prednisone, 5 mg BID for 5 days from 9/1  - mild leucocytosis likely related to steroid    Hyperglycemia  -  this am, likely steroid induced, check HbA1C     Hx encephalopathy with aphasia  In 11/2019. She states was 2/2 allergies.        Diet: NPO for Medical/Clinical Reasons Except for: Meds, Ice Chips  NPO for Medical/Clinical Reasons Except for: Meds    DVT Prophylaxis: Pneumatic Compression Devices and heparin drip  Gandhi Catheter: Not present  Central Lines: None  Cardiac Monitoring: ACTIVE order. Indication: AMI (NSTEMI/ STEMI) (48 hours)  Code Status: Full Code      Disposition Plan  "     Expected Discharge Date: 09/05/2022                The patient's care was discussed with the Bedside Nurse, Patient and Patient's Family.    Serina Hernandez MD  Hospitalist Service  [facility]  Securely message with the Vocera Web Console (learn more here)  Text page via [facility] Paging/Directory         Clinically Significant Risk Factors Present on Admission         # Hyponatremia: Na = 132 mmol/L (Ref range: 133 - 144 mmol/L) on admission, will monitor as appropriate     # Hypoalbuminemia: Albumin = 3.1 g/dL (Ref range: 3.4 - 5.0 g/dL) on admission, will monitor as appropriate        # Overweight: Estimated body mass index is 25.89 kg/m  as calculated from the following:    Height as of this encounter: 1.5 m (4' 11.06\").    Weight as of this encounter: 58.2 kg (128 lb 6.4 oz).        ______________________________________________________________________    Interval History   Patient evaluated this morning, reports mild chest pain 3/10, remains on heparin and nitroglycerine drip, had headache -worse this am, better after tylenol.   -afebrile.     Data reviewed today: I reviewed all medications, new labs and imaging results over the last 24 hours. I personally reviewed the EKG tracing showing sinus on tele. CXR no acute process.    Physical Exam   Vital Signs: Temp: 97.8  F (36.6  C) Temp src: Oral BP: (!) 153/92 Pulse: 75   Resp: 17 SpO2: 99 % O2 Device: Nasal cannula Oxygen Delivery: 2 LPM  Weight: 128 lbs 6.4 oz     General: AAOx3, very pleasant, appears comfortable.  HEENT: PERRLA EOMI. Mucosa moist.   Lungs: Bilateral equal air entry. Clear to auscultation, normal work of breathing.   CVS: S1S2 regular, no tachycardia or murmur.   Abdomen: Soft, NT, ND. BS heard.  MSK: No edema or deformities.  Neuro: AAOX3. CN 2-12 normal. Strength symmetrical.  Skin: No rash.       Data   Recent Labs   Lab 08/31/22  2102   WBC 13.4*   HGB 13.7   MCV 89      *   POTASSIUM 3.9 "   CHLORIDE 99   CO2 23   BUN 22   CR 0.58   ANIONGAP 10   APRIL 9.0   *   ALBUMIN 3.1*   PROTTOTAL 6.5*   BILITOTAL 0.2   ALKPHOS 92   ALT 36   AST 13   LIPASE 310     Recent Results (from the past 24 hour(s))   Chest XR,  PA & LAT    Narrative    EXAM: XR CHEST 2 VIEWS  LOCATION: [facility]  DATE/TIME: 8/31/2022 9:13 PM    INDICATION: Chest pain.  COMPARISON: CT exam of the chest performed 08/12/2022.      Impression    IMPRESSION: Heart size is normal. No pleural effusion, pneumothorax, or abnormal area of consolidation.     Medications     - MEDICATION INSTRUCTIONS -       heparin 400 Units/hr (09/01/22 0801)     nitroGLYcerin 30 mcg/min (09/01/22 0801)     - MEDICATION INSTRUCTIONS -       - MEDICATION INSTRUCTIONS -       sodium chloride 75 mL/hr at 09/01/22 0133     sodium chloride 150 mL/hr at 09/01/22 0923       aspirin  81 mg Oral Daily     atorvastatin  40 mg Oral QPM     fluticasone-vilanterol  1 puff Inhalation Daily     lisinopril  10 mg Oral Daily     metoprolol tartrate  25 mg Oral BID     predniSONE  5 mg Oral BID w/meals     sodium chloride (PF)  3 mL Intracatheter Q8H     sodium chloride (PF)  3 mL Intracatheter Q8H

## 2022-09-01 NOTE — CONSULTS
Kalani Rowan is 71 year old female scheduled to undergo CABG tomorrow. Asked to see patient as she had concerns about intubation in light of her history of a tracheostomy when she was 6 years old. This was placed during an episode with a peanut and was only temporary. Currently on room air, no lingering effects. Reviewed CT chest which showed a patent trachea, no obvious stenosis. MPII, normal mouth opening.     Reassured patient that intubation should not be a problem for her upcoming surgery. She had no further questions or concerns.    Julian Garcia MD  [facility] Anesthesiology  5:21 PM September 1, 2022

## 2022-09-01 NOTE — CONSULTS
"Cardiology Consultation      Kalani Rowan MRN# [id number]   YOB: 1950 Age: 71 year old   Date of Admission: 2022     Reason for consult:  NSTEMI           Assessment and Plan:     1. Acute anteroapical infarction, non-ST elevation myocardial infarction but hyperacute T waves and akinesis on echocardiogram consistent with high-grade or complete occlusion of the mid LAD territory    Plan cardiac catheterization today.  No contraindication to drug-eluting stent.  Discussed risks and benefits and remote possibility of alternate diagnoses such as spasm or stress cardiomyopathy, but reiterated that the most likely etiology is obstructive coronary artery disease.    Complex decision making today.  I personally reviewed ECG images as well as echocardiographic images and coordinating care with staff.  Patient has acute infarction and requires definitive evaluation and treatment.               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    Echocardiogram has apical akinesis consistent with mid LAD infarct with wraparound distribution.    Currently chest discomfort has improved but is still present.  She is on IV heparin and nitroglycerin.  Hyperacute ST-T have improved on most recent ECG as well.             Physical Exam:   Vitals were reviewed  Blood pressure (!) 153/92, pulse 75, temperature 97.8  F (36.6  C), temperature source Oral, resp. rate 17, height 1.5 m (4' 11.06\"), weight 58.2 kg (128 lb 6.4 oz), SpO2 99 %, not currently breastfeeding.  Temperatures:  Current - Temp: 97.8  F (36.6  C); Max - Temp  Av.3  F (36.8  C)  Min: 97.8  F (36.6  C)  Max: 98.6  F (37  C)  Respiration range: Resp  Av.8  Min: 11  Max: 33  Pulse range: Pulse  Av.5  Min: 49  Max: 110  Blood pressure range: Systolic " "(24hrs), Av , Min:86 , Max:185   ; Diastolic (24hrs), Av, Min:48, Max:114    Pulse oximetry range: SpO2  Av.3 %  Min: 94 %  Max: 100 %  No intake or output data in the 24 hours ending 22 0843  Constitutional:   awake, alert, cooperative, no apparent distress, and appears stated age     Eyes:   Lids and lashes normal, pupils equal, round and reactive to light, extra ocular muscles intact, sclera clear, conjunctiva normal     Neck:   supple, symmetrical, trachea midline,      Back:   symmetric     Lungs:   Symmetric, clear     Cardiovascular:   Regular, no significant murmurs     Abdomen:   non-tender     Musculoskeletal:   motor strength is 5 out of 5 all extremities bilaterally     Neurologic:   Grossly nonfocal     Skin:   normal skin color, texture, turgor     Additional findings:                  Past Medical History:   I have reviewed this patient's past medical history  Past Medical History:   Diagnosis Date     Allergy, unspecified not elsewhere classified              Past Surgical History:   I have reviewed this patient's past surgical history  Past Surgical History:   Procedure Laterality Date     [facility] NONSPECIFIC PROCEDURE      S/P Right Tracheotomy Age 6 in [place] \"choking on nuts\"     [facility] NONSPECIFIC PROCEDURE      Left Foot Cyst               Social History:   I have reviewed this patient's social history  Social History     Tobacco Use     Smoking status: Never Smoker     Smokeless tobacco: Never Used   Substance Use Topics     Alcohol use: No     Alcohol/week: 0.0 standard drinks             Family History:   I have reviewed this patient's family history  Family History   Problem Relation Age of Onset     Colon Cancer Mother      Other Cancer Father         skin     Heart Failure Father              Allergies:     Allergies   Allergen Reactions     Codeine      Elderberry [Sambucus Canadensis]      Elderberry syrup- ER.              Medications:   I have reviewed this patient's " current medications  Facility-Administered Medications Prior to Admission   Medication Dose Route Frequency Provider Last Rate Last Admin     [DISCONTINUED] sodium chloride (PF) 0.9% PF flush 3 mL  3 mL Intravenous q1 min prn Tessa Soler PA-C         Medications Prior to Admission   Medication Sig Dispense Refill Last Dose     albuterol (PROVENTIL) (2.5 MG/3ML) 0.083% neb solution Take 2.5 mg by nebulization every 6 hours as needed for shortness of breath / dyspnea or wheezing   8/31/2022 at am     fluticasone-salmeterol (ADVAIR-HFA) 230-21 MCG/ACT inhaler Inhale 2 puffs into the lungs 2 times daily 12 g 3 8/31/2022 at am     predniSONE (DELTASONE) 10 MG tablet Take by mouth 2 times daily Taper: 10 mg twice daily 8/31/22 and 9/1/22, then 5 mg twice daily for 5 days   8/31/2022 at am     albuterol (PROAIR HFA) 108 (90 Base) MCG/ACT inhaler Inhale 1-2 puffs into the lungs every 6 hours as needed for shortness of breath / dyspnea 18 g 4      Current Facility-Administered Medications Ordered in Epic   Medication Dose Route Frequency Last Rate Last Admin     acetaminophen (TYLENOL) tablet 650 mg  650 mg Oral Q6H PRN   650 mg at 09/01/22 0758    Or     acetaminophen (TYLENOL) Suppository 650 mg  650 mg Rectal Q6H PRN         albuterol (PROVENTIL) neb solution 2.5 mg  2.5 mg Nebulization Q6H PRN         aspirin EC tablet 81 mg  81 mg Oral Daily         atorvastatin (LIPITOR) tablet 40 mg  40 mg Oral QPM         Continuing aspirin from home medication list OR daily aspirin order already placed during this visit   Does not apply DOES NOT GO TO MAR         fluticasone-vilanterol (BREO ELLIPTA) 200-25 MCG/INH inhaler 1 puff  1 puff Inhalation Daily         heparin 25,000 units in 0.45% NaCl 250 mL ANTICOAGULANT infusion  0-5,000 Units/hr Intravenous Continuous 4 mL/hr at 09/01/22 0801 400 Units/hr at 09/01/22 0801     HOLD: nitroGLYcerin IF   Does not apply HOLD         lidocaine (LMX4) cream   Topical Q1H PRN          lidocaine 1 % 0.1-1 mL  0.1-1 mL Other Q1H PRN         lisinopril (ZESTRIL) tablet 10 mg  10 mg Oral Daily         melatonin tablet 1 mg  1 mg Oral At Bedtime PRN         metoprolol tartrate (LOPRESSOR) tablet 25 mg  25 mg Oral BID         nitroGLYcerin (NITROSTAT) sublingual tablet 0.4 mg  0.4 mg Sublingual Q5 Min PRN   0.4 mg at 08/31/22 2203     nitroGLYcerin 50 mg in D5W 250 mL (adult std) infusion CENTRAL   mcg/min Intravenous Continuous 9 mL/hr at 09/01/22 0801 30 mcg/min at 09/01/22 0801     ondansetron (ZOFRAN ODT) ODT tab 4 mg  4 mg Oral Q6H PRN        Or     ondansetron (ZOFRAN) injection 4 mg  4 mg Intravenous Q6H PRN         Patient is already receiving anticoagulation with heparin, enoxaparin (LOVENOX), warfarin (COUMADIN)  or other anticoagulant medication   Does not apply Continuous PRN         polyethylene glycol (MIRALAX) Packet 17 g  17 g Oral Daily PRN         predniSONE (DELTASONE) tablet 5 mg  5 mg Oral BID w/meals         senna-docusate (SENOKOT-S/PERICOLACE) 8.6-50 MG per tablet 1 tablet  1 tablet Oral BID PRN        Or     senna-docusate (SENOKOT-S/PERICOLACE) 8.6-50 MG per tablet 2 tablet  2 tablet Oral BID PRN         sodium chloride (PF) 0.9% PF flush 3 mL  3 mL Intracatheter Q8H         sodium chloride (PF) 0.9% PF flush 3 mL  3 mL Intracatheter q1 min prn         sodium chloride 0.9% infusion   Intravenous Continuous 75 mL/hr at 09/01/22 0133 New Bag at 09/01/22 0133     No current Epic-ordered outpatient medications on file.             Review of Systems:   The 10 point Review of Systems is negative other than noted in the HPI            Data:   All laboratory data reviewed  Results for orders placed or performed during the hospital encounter of 08/31/22 (from the past 24 hour(s))   EKG 12 lead   Result Value Ref Range    Systolic Blood Pressure  mmHg    Diastolic Blood Pressure  mmHg    Ventricular Rate 76 BPM    Atrial Rate 76 BPM    MO Interval 160 ms    QRS Duration 78 ms      ms    QTc 416 ms    P Axis 73 degrees    R AXIS 15 degrees    T Axis 17 degrees    Interpretation ECG       Sinus rhythm  Nonspecific ST abnormality  Abnormal ECG  When compared with ECG of 02-NOV-2019 14:43,  No significant change was found  Confirmed by GENERATED REPORT, COMPUTER ([id number]),  Angeli Ibarra ([id number]) on 8/31/2022 9:10:24 PM     CBC with platelets differential    Narrative    The following orders were created for panel order CBC with platelets differential.  Procedure                               Abnormality         Status                     ---------                               -----------         ------                     CBC with platelets and d...[[id number]]  Abnormal            Final result                 Please view results for these tests on the individual orders.   Basic metabolic panel   Result Value Ref Range    Sodium 132 (L) 133 - 144 mmol/L    Potassium 3.9 3.4 - 5.3 mmol/L    Chloride 99 94 - 109 mmol/L    Carbon Dioxide (CO2) 23 20 - 32 mmol/L    Anion Gap 10 3 - 14 mmol/L    Urea Nitrogen 22 7 - 30 mg/dL    Creatinine 0.58 0.52 - 1.04 mg/dL    Calcium 9.0 8.5 - 10.1 mg/dL    Glucose 163 (H) 70 - 99 mg/dL    GFR Estimate >90 >60 mL/min/1.73m2   Troponin I   Result Value Ref Range    Troponin I High Sensitivity 165 (HH) <54 ng/L   Hepatic function panel   Result Value Ref Range    Bilirubin Total 0.2 0.2 - 1.3 mg/dL    Bilirubin Direct <0.1 0.0 - 0.2 mg/dL    Protein Total 6.5 (L) 6.8 - 8.8 g/dL    Albumin 3.1 (L) 3.4 - 5.0 g/dL    Alkaline Phosphatase 92 40 - 150 U/L    AST 13 0 - 45 U/L    ALT 36 0 - 50 U/L   Lipase   Result Value Ref Range    Lipase 310 73 - 393 U/L   CBC with platelets and differential   Result Value Ref Range    WBC Count 13.4 (H) 4.0 - 11.0 10e3/uL    RBC Count 4.70 3.80 - 5.20 10e6/uL    Hemoglobin 13.7 11.7 - 15.7 g/dL    Hematocrit 42.0 35.0 - 47.0 %    MCV 89 78 - 100 fL    MCH 29.1 26.5 - 33.0 pg    MCHC 32.6 31.5 - 36.5 g/dL    RDW 12.6  10.0 - 15.0 %    Platelet Count 336 150 - 450 10e3/uL    % Neutrophils 89 %    % Lymphocytes 8 %    % Monocytes 3 %    % Eosinophils 0 %    % Basophils 0 %    % Immature Granulocytes 0 %    NRBCs per 100 WBC 0 <1 /100    Absolute Neutrophils 11.9 (H) 1.6 - 8.3 10e3/uL    Absolute Lymphocytes 1.1 0.8 - 5.3 10e3/uL    Absolute Monocytes 0.3 0.0 - 1.3 10e3/uL    Absolute Eosinophils 0.0 0.0 - 0.7 10e3/uL    Absolute Basophils 0.0 0.0 - 0.2 10e3/uL    Absolute Immature Granulocytes 0.1 <=0.4 10e3/uL    Absolute NRBCs 0.0 10e3/uL   [facility] Draw    Narrative    The following orders were created for panel order [facility].  Procedure                               Abnormality         Status                     ---------                               -----------         ------                     Extra Blue Top Tube[[id number]]                              Final result               Extra Red Top Tube[[id number]]                               Final result               Extra Green Top (Lithium...[[id number]]                      Final result               Extra Purple Top Tube[[id number]]                            Final result                 Please view results for these tests on the individual orders.   Extra Blue Top Tube   Result Value Ref Range    Hold Specimen JIC    Chest XR,  PA & LAT    Narrative    EXAM: XR CHEST 2 VIEWS  LOCATION: [facility]  DATE/TIME: 8/31/2022 9:13 PM    INDICATION: Chest pain.  COMPARISON: CT exam of the chest performed 08/12/2022.      Impression    IMPRESSION: Heart size is normal. No pleural effusion, pneumothorax, or abnormal area of consolidation.   EKG 12 lead   Result Value Ref Range    Systolic Blood Pressure  mmHg    Diastolic Blood Pressure  mmHg    Ventricular Rate 86 BPM    Atrial Rate 86 BPM    FL Interval 170 ms    QRS Duration 74 ms     ms    QTc 435 ms    P Axis 68 degrees    R AXIS -5 degrees    T Axis 10 degrees    Interpretation ECG       Sinus  rhythm  Moderate voltage criteria for LVH, may be normal variant  Nonspecific ST abnormality  Abnormal ECG  When compared with ECG of 31-AUG-2022 20:45,  No significant change was found  Confirmed by GENERATED REPORT, COMPUTER ([id number]),  Opal Francisco ([id number]) on 8/31/2022 9:58:26 PM     Asymptomatic COVID-19 Virus (Coronavirus) by PCR Nasopharyngeal    Specimen: Nasopharyngeal; Swab   Result Value Ref Range    SARS CoV2 PCR Negative Negative    Narrative    Testing was performed using the Xpert Xpress SARS-CoV-2 Assay on the   Cepheid Gene-Xpert Instrument Systems. Additional information about   this Emergency Use Authorization (EUA) assay can be found via the Lab   Guide. This test should be ordered for the detection of SARS-CoV-2 in   individuals who meet SARS-CoV-2 clinical and/or epidemiological   criteria. Test performance is unknown in asymptomatic patients. This   test is for in vitro diagnostic use under the FDA EUA for   laboratories certified under CLIA to perform high complexity testing.   This test has not been FDA cleared or approved. A negative result   does not rule out the presence of PCR inhibitors in the specimen or   target RNA in concentration below the limit of detection for the   assay. The possibility of a false negative should be considered if   the patient's recent exposure or clinical presentation suggests   COVID-19. This test was validated by the [facility]. This laboratory is certified under the Clinical Laboratory Improvement Amendments of 1988 (CLIA-88) as qualified to perform high complexity laboratory testing.     Extra Red Top Tube   Result Value Ref Range    Hold Specimen JIC    Extra Green Top (Lithium Heparin) Tube   Result Value Ref Range    Hold Specimen JIC    Extra Purple Top Tube   Result Value Ref Range    Hold Specimen JIC    Troponin I   Result Value Ref Range    Troponin I High Sensitivity 4,119 (HH) <54 ng/L   Lipid panel reflex  to direct LDL   Result Value Ref Range    Cholesterol 258 (H) <200 mg/dL    Triglycerides 103 <150 mg/dL    Direct Measure HDL 65 >=50 mg/dL    LDL Cholesterol Calculated 172 (H) <=100 mg/dL    Non HDL Cholesterol 193 (H) <130 mg/dL    Narrative    Cholesterol  Desirable:  <200 mg/dL    Triglycerides  Normal:  Less than 150 mg/dL  Borderline High:  150-199 mg/dL  High:  200-499 mg/dL  Very High:  Greater than or equal to 500 mg/dL    Direct Measure HDL  Female:  Greater than or equal to 50 mg/dL   Male:  Greater than or equal to 40 mg/dL    LDL Cholesterol  Desirable:  <100mg/dL  Above Desirable:  100-129 mg/dL   Borderline High:  130-159 mg/dL   High:  160-189 mg/dL   Very High:  >= 190 mg/dL    Non HDL Cholesterol  Desirable:  130 mg/dL  Above Desirable:  130-159 mg/dL  Borderline High:  160-189 mg/dL  High:  190-219 mg/dL  Very High:  Greater than or equal to 220 mg/dL   EKG 12-lead, tracing only   Result Value Ref Range    Systolic Blood Pressure  mmHg    Diastolic Blood Pressure  mmHg    Ventricular Rate 77 BPM    Atrial Rate 77 BPM    WA Interval 166 ms    QRS Duration 74 ms     ms    QTc 439 ms    P Axis 78 degrees    R AXIS 13 degrees    T Axis 16 degrees    Interpretation ECG       Sinus rhythm  Normal ECG  When compared with ECG of 31-AUG-2022 21:50,  Non-specific change in ST segment in Inferior leads  Confirmed by GENERATED REPORT, COMPUTER ([id number]),  Anthony Florez ([id number]) on 9/1/2022 1:59:25 AM     Heparin Unfractionated Anti Xa Level   Result Value Ref Range    Anti Xa Unfractionated Heparin 0.91 For Reference Range, See Comment IU/mL    Narrative    Therapeutic Range: UFH: 0.25-0.50 IU/mL for low intensity dosing,  0.30-0.70 IU/mL for high intensity dosing DVT and PE.  This test is not validated for other direct factor X inhibitors (e.g. rivaroxaban, apixaban, edoxaban, betrixaban, fondaparinux) and should not be used for monitoring of other medications.   EKG 12-lead, tracing only   Result  Value Ref Range    Systolic Blood Pressure  mmHg    Diastolic Blood Pressure  mmHg    Ventricular Rate 78 BPM    Atrial Rate 78 BPM    GA Interval 188 ms    QRS Duration 84 ms     ms    QTc 437 ms    P Axis 70 degrees    R AXIS -11 degrees    T Axis 10 degrees    Interpretation ECG       Sinus rhythm  Minimal voltage criteria for LVH, may be normal variant  Borderline ECG  When compared with ECG of 01-SEP-2022 01:48,  No significant change was found     Troponin I   Result Value Ref Range    Troponin I High Sensitivity 12,068 (HH) <54 ng/L       Lab Results   Component Value Date    CHOL 258 09/01/2022    CHOL 377 11/02/2019     Lab Results   Component Value Date    HDL 65 09/01/2022    HDL 54 11/02/2019     Lab Results   Component Value Date     09/01/2022    LDL  11/02/2019     Cannot estimate LDL when triglyceride exceeds 400 mg/dL     11/02/2019     Lab Results   Component Value Date    TRIG 103 09/01/2022    TRIG 547 11/02/2019     Lab Results   Component Value Date    CHOLHDLRATIO 7.4 11/18/2013     TSH   Date Value Ref Range Status   11/02/2019 3.94 0.40 - 4.00 mU/L Final

## 2022-09-01 NOTE — ED TRIAGE NOTES
Pt had substernal pain, shortness of breath . Pt took 2 nitroglycerin from her  relieved pain. After taking two tums.

## 2022-09-01 NOTE — PLAN OF CARE
[id number]  Patient is alert and oriented times four.  Assist of SBA.  Nitroglycerin drip running at 30 mcg/min (right)  Heparin drip running at 400 units an hour (left)   - next Xa at 1245  Denies SOB on 2L NC  NPO  Plan for cards consult, possible angio, echo, and cardiac rehab

## 2022-09-01 NOTE — PROGRESS NOTES
CV Surgery     Patient and family seen. Surgery explained in great detail, angio reviewed, echo reviewed. Agreeable for surgery. Dr. Tsai aware.     Tomorrow CABG with Dr. Tsai [id number].    Vannesa Mike PA-C

## 2022-09-01 NOTE — ED NOTES
Bed: ED12  Expected date: 8/31/22  Expected time: 8:30 PM  Means of arrival: Ambulance  Comments:  [employer] 71F chest pain

## 2022-09-01 NOTE — ED PROVIDER NOTES
History   Chief Complaint:  Chest pain     HPI   Kalani Rowan is a 71 year old female with history of CVA, hyperlipidemia who presents with her  for evaluation of chest pain. The patient reports onset of sternal chest pain and nausea earlier this evening. She thought it was indigestion first so she took a Tums with no relief. She then took 2 nitroglycerin with relief. When she got up to go to the restroom she had a syncopal episode. She did not hit her head or have any other injury with this. The patient denies any other concerns including vomiting at this time. No cardiac history. She notes she is currently on prednisone and has 7 days left in her course.     Review of Systems   Cardiovascular: Positive for chest pain.   Gastrointestinal: Positive for nausea. Negative for vomiting.   Neurological: Positive for syncope.   All other systems reviewed and are negative.    Allergies:  Codeine  Elderberry     Medications:  Prednisone   Albuterol   Tessalon  Silenor  Advair    Past Medical History:     GERD  Hyperlipidemia   Hemorrhoids    Uterine leiomyoma   Asthma  PPD positive  CVA     Past Surgical History:    Right tracheostomy   Left foot cyst removal     Family History:    Mother: colon cancer  Father: skin cancer, heart failure     Social History:  The patient presents to the ED with her   PCP: Lucia Lind     Physical Exam     Patient Vitals for the past 24 hrs:   BP Temp Temp src Pulse Resp SpO2   08/31/22 2041 -- -- -- -- -- 99 %   08/31/22 2040 (!) 158/81 98.4  F (36.9  C) Oral 83 20 --       Physical Exam  Gen: well appearing, in no acute distress  Oral : Mucous membranes moist,   Nose: No rhinorhea  Ears: External near normal, without drainage  Eyes: periorbital tissues and sclera normal   Neck: supple, no abnormal swelling  Lungs: Clear bilaterally, no tachypnea or distress, speaks full sentences  CV: Regular rate, regular rhythm  Abd: soft, nontender, nondistended, no  rebound/guarding  Ext: no lower extremity edema  Skin: warm, dry, well perfused, no rashes/bruising/lesions on exposed skin  Neuro: alert, no gross motor or sensory deficits,   Psych: pleasant mood, normal affect    Emergency Department Course   ECG 1  ECG taken at 2045, ECG read at 2050  NSR  Nonspecific ST abnormality   Abnormal ECG    No significant change as compared to prior, dated 11/2/2019.  Rate 76 bpm. NE interval 160 ms. QRS duration 78 ms. QT/QTc 370/416 ms. P-R-T axes 73 15 17.     ECG 2  ECG taken at 2150, ECG read at 2151  NSR  Moderate voltage criteria for LVH, may be normal variant  nonspecific ST abnormality   Abnormal ECG   No significant change as compared to prior, dated 8/31/2022.  Rate 86 bpm. NE interval 170 ms. QRS duration 74 ms. QT/QTc 364/435 ms. P-R-T axes 68 -5 10.     Imaging:  Chest XR,  PA & LAT   Final Result   IMPRESSION: Heart size is normal. No pleural effusion, pneumothorax, or abnormal area of consolidation.        Report per radiology    Laboratory:  Labs Ordered and Resulted from Time of ED Arrival to Time of ED Departure   BASIC METABOLIC PANEL - Abnormal       Result Value    Sodium 132 (*)     Potassium 3.9      Chloride 99      Carbon Dioxide (CO2) 23      Anion Gap 10      Urea Nitrogen 22      Creatinine 0.58      Calcium 9.0      Glucose 163 (*)     GFR Estimate >90     TROPONIN I - Abnormal    Troponin I High Sensitivity 165 (*)    HEPATIC FUNCTION PANEL - Abnormal    Bilirubin Total 0.2      Bilirubin Direct <0.1      Protein Total 6.5 (*)     Albumin 3.1 (*)     Alkaline Phosphatase 92      AST 13      ALT 36     CBC WITH PLATELETS AND DIFFERENTIAL - Abnormal    WBC Count 13.4 (*)     RBC Count 4.70      Hemoglobin 13.7      Hematocrit 42.0      MCV 89      MCH 29.1      MCHC 32.6      RDW 12.6      Platelet Count 336      % Neutrophils 89      % Lymphocytes 8      % Monocytes 3      % Eosinophils 0      % Basophils 0      % Immature Granulocytes 0      NRBCs per 100  WBC 0      Absolute Neutrophils 11.9 (*)     Absolute Lymphocytes 1.1      Absolute Monocytes 0.3      Absolute Eosinophils 0.0      Absolute Basophils 0.0      Absolute Immature Granulocytes 0.1      Absolute NRBCs 0.0     LIPASE - Normal    Lipase 310     COVID-19 VIRUS (CORONAVIRUS) BY PCR        Procedures      Emergency Department Course:  Reviewed:  I reviewed nursing notes, vitals, past medical history and Care Everywhere    Assessments:  2040 I obtained history and examined the patient as noted above.   2136 Updated that patient's troponin was 125.  2137 I rechecked the patient and explained findings to her and her .   2214 I rechecked the patient.     Consults:      Interventions:  Medications   nitroGLYcerin 50 mg in D5W 250 mL (adult std) infusion CENTRAL (10 mcg/min Intravenous New Bag 8/31/22 2207)   nitroGLYcerin (NITROSTAT) sublingual tablet 0.4 mg (0.4 mg Sublingual Given 8/31/22 2203)   heparin 25,000 units in 0.45% NaCl 250 mL ANTICOAGULANT infusion (700 Units/hr Intravenous New Bag 8/31/22 2218)   aspirin (ASA) chewable tablet 324 mg (324 mg Oral Given 8/31/22 2141)   heparin loading dose for LOW INTENSITY TREATMENT * Give BEFORE starting heparin infusion (3,400 Units Intravenous Given 8/31/22 2219)     Disposition:  The patient was admitted to the hospital under the care of Dr. Ovalles.     Impression & Plan       Medical Decision Making:  Patient presents emergency department chest pain that began this evening.  History of GERD, took some Tums that was not really working.   gave her 2 tablets of nitroglycerin which did seem to help with her pain.  When I first saw her she was not having any pain, some questionable T inversions inferiorly with some ST depression.  Troponin came back elevated on reassessment said she was having 4 out of 10 chest pain.  Gave her another tablet of nitro, chest pain improved again.  Starting a nitro infusion.  She did get a little lightheaded with  that nitro tablet in the ED.  Aspirin given, heparin started.  Contacted hospitalist is in agreement for inpatient management.        Diagnosis:    ICD-10-CM    1. NSTEMI (non-ST elevated myocardial infarction) (H)  I21.4        Discharge Medications:  New Prescriptions    No medications on file       Scribe Disclosure:  I, Nawafannabelle Al, am serving as a scribe at 8:37 PM on 8/31/2022 to document services personally performed by Adam Fitzpatrick MD based on my observations and the provider's statements to me.            Adam Fitzpatrick MD  08/31/22 1124

## 2022-09-01 NOTE — ANESTHESIA PREPROCEDURE EVALUATION
"Anesthesia Pre-Procedure Evaluation    Patient: Kalani Rowan   MRN: [id number] : 1950        Procedure : Procedure(s):  CORONARY ARTERY BYPASS GRAFT (CABG)          Past Medical History:   Diagnosis Date     Allergy, unspecified not elsewhere classified       Past Surgical History:   Procedure Laterality Date     Z NONSPECIFIC PROCEDURE      S/P Right Tracheotomy Age 6 in [place] \"choking on nuts\"     Z NONSPECIFIC PROCEDURE      Left Foot Cyst      Allergies   Allergen Reactions     Codeine      Elderberry [Sambucus Canadensis]      Elderberry syrup- ER.       Social History     Tobacco Use     Smoking status: Never Smoker     Smokeless tobacco: Never Used   Substance Use Topics     Alcohol use: No     Alcohol/week: 0.0 standard drinks      Wt Readings from Last 1 Encounters:   22 58.2 kg (128 lb 6.4 oz)          Prior to Admission medications    Medication Sig Start Date End Date Taking? Authorizing Provider   albuterol (PROVENTIL) (2.5 MG/3ML) 0.083% neb solution Take 2.5 mg by nebulization every 6 hours as needed for shortness of breath / dyspnea or wheezing   Yes Unknown, Entered By History   fluticasone-salmeterol (ADVAIR-HFA) 230-21 MCG/ACT inhaler Inhale 2 puffs into the lungs 2 times daily 22  Yes Lucia Lind MD   predniSONE (DELTASONE) 10 MG tablet Take by mouth 2 times daily Taper: 10 mg twice daily 22 and 22, then 5 mg twice daily for 5 days   Yes Unknown, Entered By History   albuterol (PROAIR HFA) 108 (90 Base) MCG/ACT inhaler Inhale 1-2 puffs into the lungs every 6 hours as needed for shortness of breath / dyspnea 22   Erum Castro APRN CNP     Cath 22:   Left Main   Moderate sized left main without angiographic disease   Left Anterior Descending   Moderate sized LAD that wraps around apex with 50% tubular lesion in its proximal segment. It gives off 3 small diagonal branches. There is a 95% lesion in the mid-LAD just distal to the 3rd diagonal " branch. LAD provides left to right collaterals to the PAV/PL system.   Prox LAD lesion is 50% stenosed.   Mid LAD-1 lesion is 60% stenosed.   Mid LAD-2 lesion is 90% stenosed.   First Diagonal Branch   The vessel is small and is angiographically normal.   Second Diagonal Branch   2nd Diag lesion is 40% stenosed.   Third Diagonal Branch   The vessel is small and is angiographically normal.   Left Circumflex   Moderate sized LCx gives off high OM1. In the mid-LCx there is a focal 95% lesion. Distal to this lesion is a 40% tubular lesion. LCx gives off a terminal OM2 branch that is moderate in size and appears angiographically normal.   Prox Cx to Mid Cx lesion is 95% stenosed.   Mid Cx lesion is 40% stenosed.   First Obtuse Marginal Branch   The vessel is angiographically normal.   Second Obtuse Marginal Branch   The vessel is moderate in size and is angiographically normal.   Right Coronary Artery   RCA is normal in its proximal segment. In its mid-segment there is possibly a short  vs subtotally occluded segment. There are right-to-right bridging collaterals. The distal RCA, rPDA and PAV/PL branches are all angiographically normal. There are left to right collaterals to the PL system from the LAD.   Collaterals   Mid RCA filled by collaterals from Prox RCA.      Prox RCA to Mid RCA lesion is 99% stenosed.   Second Right Posterolateral Branch   Collaterals   2nd RPL filled by collaterals from Dist LAD.      Third Right Posterolateral Branch   Collaterals   3rd RPL filled by collaterals from 3rd Sept       ECG 9/1/22: Sinus rhythm   Minimal voltage criteria for LVH  ECHO 9/1/22: ______________________________________________________________________________  Interpretation Summary     Left ventricular systolic function is mildly reduced.The visual ejection  fraction is 45-50%.There is severe apical wall hypokinesis.Mid to distal  septal wall severe hypokinesia,  The right ventricular systolic function is  normal.  There is trace aortic regurgitation.  The inferior vena cava was normal in size with preserved respiratory  variability.     Echo dated 11/03/2019 LVEF was noted 65% with normal wall motion.     ______________________________________________________________________________  Left Ventricle  The left ventricle is normal in size. There is normal left ventricular wall  thickness. Diastolic Doppler findings (E/E' ratio and/or other parameters)  suggest left ventricular filling pressures are increased. Left ventricular  systolic function is mildly reduced. The visual ejection fraction is 45-50%.  There is severe apical wall hypokinesis. Mid to distal septal wall severe hypokinesia,.     Right Ventricle  The right ventricle is normal size. The right ventricular systolic function is  normal.     Atria  The left atrium is mildly dilated. Right atrial size is normal. There is no  color Doppler evidence of an atrial shunt.     Mitral Valve  There is mild mitral annular calcification. There is trace mitral regurgitation.     Tricuspid Valve  There is trace tricuspid regurgitation. Right ventricular systolic pressure could not be approximated due to inadequate tricuspid regurgitation.     Aortic Valve  The aortic valve is trileaflet. There is trace aortic regurgitation. No aortic stenosis is present.     Pulmonic Valve  There is no pulmonic valvular stenosis.     Vessels  The aortic root is normal size. Normal size ascending aorta. The inferior vena cava was normal in size with preserved respiratory variability.     Pericardium  There is no pericardial effusion.     Rhythm  Sinus rhythm was noted.      Anesthesia Evaluation   Pt has had prior anesthetic. Type: General.    No history of anesthetic complications       ROS/MED HX  ENT/Pulmonary: Comment: Hx tracheostomy in youth    (+) asthma recent URI (recent COVID 8/9/22. On prednisone taper for continuing SOB),  (-) tobacco use and sleep apnea   Neurologic:     (+) CVA  (hx aphasia in 2019),  (-) no seizures and migraines   Cardiovascular:     (+) Dyslipidemia hypertension-Peripheral Vascular Disease (right 50-69% stenosis, left < 50%)-- Carotid Stenosis. CAD angina---GUEVARA.  (-) arrhythmias and valvular problems/murmurs   METS/Exercise Tolerance:     Hematologic:    (-) history of blood clots and anemia   Musculoskeletal:    (-) arthritis   GI/Hepatic:    (-) GERD and liver disease   Renal/Genitourinary:    (-) renal disease and nephrolithiasis   Endo:    (-) Type I DM, Type II DM, thyroid disease and obesity   Psychiatric/Substance Use:    (-) psychiatric history   Infectious Disease:    (-) Recent Fever   Malignancy:       Other:            Physical Exam    Airway        Mallampati: III   TM distance: > 3 FB   Neck ROM: full   Mouth opening: > 3 cm    Respiratory Devices and Support         Dental  no notable dental history         Cardiovascular          Rhythm and rate: normal     Pulmonary   pulmonary exam normal        breath sounds clear to auscultation           OUTSIDE LABS:  CBC:   Lab Results   Component Value Date    WBC 13.4 (H) 08/31/2022    WBC 11.7 (H) 08/09/2022    HGB 13.7 08/31/2022    HGB 13.9 08/09/2022    HCT 42.0 08/31/2022    HCT 41.7 08/09/2022     08/31/2022     08/09/2022     BMP:   Lab Results   Component Value Date     (L) 08/31/2022     08/09/2022    POTASSIUM 3.9 08/31/2022    POTASSIUM 4.0 08/09/2022    CHLORIDE 99 08/31/2022    CHLORIDE 106 08/09/2022    CO2 23 08/31/2022    CO2 22 08/09/2022    BUN 22 08/31/2022    BUN 23 08/09/2022    CR 0.58 08/31/2022    CR 0.66 08/09/2022     (H) 08/31/2022     (H) 08/09/2022     COAGS:   Lab Results   Component Value Date    PTT 27 11/02/2019    INR 0.88 11/02/2019     POC:   Lab Results   Component Value Date     (H) 11/02/2019     HEPATIC:   Lab Results   Component Value Date    ALBUMIN 3.1 (L) 08/31/2022    PROTTOTAL 6.5 (L) 08/31/2022    ALT 36 08/31/2022     AST 13 08/31/2022    ALKPHOS 92 08/31/2022    BILITOTAL 0.2 08/31/2022     OTHER:   Lab Results   Component Value Date    PH 7.52 (H) 08/09/2022    LACT 0.6 08/09/2022    A1C 5.3 11/02/2019    APRIL 9.0 08/31/2022    LIPASE 310 08/31/2022    TSH 3.94 11/02/2019       Anesthesia Plan    ASA Status:  4   NPO Status:  NPO Appropriate    Anesthesia Type: General.     - Airway: ETT   Induction: Propofol.   Maintenance: Balanced.   Techniques and Equipment:     - Airway: Video-Laryngoscope     - Lines/Monitors: Arterial Line, Central Line, PAC, CVP, GERSON            GERSON Absolute Contra-indication: NONE            GERSON Relative Contra-indication: NONE     Consents    Anesthesia Plan(s) and associated risks, benefits, and realistic alternatives discussed. Questions answered and patient/representative(s) expressed understanding.    - Discussed:     - Discussed with:  Patient         Postoperative Care    Pain management: Multi-modal analgesia, IV analgesics.        Comments:    Other Comments: Nitroglcyerin, amicar, epinephrine, phenylephrine infusions ready  Have 6.5 and 7.0 ETT's ready            Joyce Recinos MD

## 2022-09-01 NOTE — ED NOTES
[facility]  ED Nurse Handoff Report    ED Chief complaint: Chest Pain      ED Diagnosis:   Final diagnoses:   NSTEMI (non-ST elevated myocardial infarction) (H)       Code Status: admitting physician to determine    Allergies:   Allergies   Allergen Reactions     Codeine      Elderberry [Sambucus Canadensis]      Elderberry syrup- ER.        Patient Story: Pt had substernal pain, shortness of breath . Pt took 2 nitroglycerin from her  relieved pain. After taking two tums.    Focused Assessment:  Patient very sensitive to nitroglycerine    Treatments and/or interventions provided:   Labs Ordered and Resulted from Time of ED Arrival to Time of ED Departure   BASIC METABOLIC PANEL - Abnormal       Result Value    Sodium 132 (*)     Potassium 3.9      Chloride 99      Carbon Dioxide (CO2) 23      Anion Gap 10      Urea Nitrogen 22      Creatinine 0.58      Calcium 9.0      Glucose 163 (*)     GFR Estimate >90     TROPONIN I - Abnormal    Troponin I High Sensitivity 165 (*)    HEPATIC FUNCTION PANEL - Abnormal    Bilirubin Total 0.2      Bilirubin Direct <0.1      Protein Total 6.5 (*)     Albumin 3.1 (*)     Alkaline Phosphatase 92      AST 13      ALT 36     CBC WITH PLATELETS AND DIFFERENTIAL - Abnormal    WBC Count 13.4 (*)     RBC Count 4.70      Hemoglobin 13.7      Hematocrit 42.0      MCV 89      MCH 29.1      MCHC 32.6      RDW 12.6      Platelet Count 336      % Neutrophils 89      % Lymphocytes 8      % Monocytes 3      % Eosinophils 0      % Basophils 0      % Immature Granulocytes 0      NRBCs per 100 WBC 0      Absolute Neutrophils 11.9 (*)     Absolute Lymphocytes 1.1      Absolute Monocytes 0.3      Absolute Eosinophils 0.0      Absolute Basophils 0.0      Absolute Immature Granulocytes 0.1      Absolute NRBCs 0.0     LIPASE - Normal    Lipase 310     COVID-19 VIRUS (CORONAVIRUS) BY PCR     Chest XR,  PA & LAT   Final Result   IMPRESSION: Heart size is normal. No pleural effusion,  pneumothorax, or abnormal area of consolidation.          Patient's response to treatments and/or interventions: Monitor    To be done/followed up on inpatient unit:  monitor    Does this patient have any cognitive concerns?: NA    Activity level - Baseline/Home:  Unknown  Activity Level - Current:   Unknown    Patient's Preferred language: English   Needed?: No    Isolation: None  Infection: Not Applicable  Patient tested for COVID 19 prior to admission: YES  Bariatric?: No    Vital Signs:   Vitals:    08/31/22 2205 08/31/22 2210 08/31/22 2215 08/31/22 2220   BP: (!) 160/100 (!) 86/48 135/81 133/74   Pulse: 94 72 79 77   Resp: 22 24 28 23   Temp:       TempSrc:       SpO2: 96% 94% 98% 97%       Cardiac Rhythm:     Was the PSS-3 completed:   Yes  What interventions are required if any?                 For the majority of the shift this patient's behavior was Green.   Behavioral interventions performed were NA.    ED NURSE PHONE NUMBER: *[phone]

## 2022-09-01 NOTE — PLAN OF CARE
"Goal Outcome Evaluation:    Plan of Care Reviewed With: patient, spouse     Overall Patient Progress: no change    Outcome Evaluation: Angio found severe 3 vessel disease, surgery consulted today    BP 97/52   Pulse 75   Temp 97.8  F (36.6  C) (Oral)   Resp 21   Ht 1.5 m (4' 11.06\")   Wt 58.2 kg (128 lb 6.4 oz)   LMP  (LMP Unknown)   SpO2 96%   BMI 25.89 kg/m   .    Assessment: Patient is alert and oriented x4. Vital signs stable, intermittent soft blood pressures. Tele NSR. L radial angio access site, WNL. Patient denies pain at this time. x1 bowel movement tdoday.     Pain management: manage pain, monitor cardiac telemetry.     Interventions this shift: angio completed, surgery consult, tolerating low fat diet.    Goals for next shift: monitor telemetry, manage pain, monitor L radial angio site. Plan for CABG tomorrow.      Ness Mcghee RN on 9/1/2022 at 6:48 PM    "

## 2022-09-01 NOTE — ED NOTES
DATE:  8/31/2022   TIME OF RECEIPT FROM LAB:  2136  LAB TEST:  Troponin  LAB VALUE:  165  RESULTS GIVEN WITH READ-BACK TO (PROVIDER):  No admitting provider for patient encounter.  TIME LAB VALUE REPORTED TO PROVIDER:   2136

## 2022-09-01 NOTE — H&P
[facility]    History and Physical  Hospitalist       Date of Admission:  8/31/2022  Date of Service (when I saw the patient): 08/31/22    Assessment & Plan   Kalani Rowan is a 71 year old female who presents with chest pain    NSTEMI  Syncopal episode  HTN  HLD  Onset of chest pain and nausea this evening. Tried tums but no help, 's NTG appeared to help. Went to bathroom and had syncopal episode after NTG, no head trauma. Mildly hypertensive in ED, otherwise VSS. Initial troponin 163. WBC 13.4 (steroids). CXR clear. EKG nonspecific t-wave changes. Concerning for ACS. Syncope suspect 2/2 NTG (her BP dropped in ED with NTG) vs arrhythmia.   - telemetry   - serial troponins  - aspirin 81 mg daily   - heparin gtt  - NTG gtt, titrate to pain   - echocardiogram  - cardiology consult  - metoprolol 25 mg BID  - lisinopril 5 mg daily   - atorvastatin 40 mg daily, check lipids    COVID-19 7/2022  [albuterol prn, advair 2 puffs BID, prednisone taper]  Persistent respiratory sx since COVID infection. Recent imaging unremarkable. Recently placed on prednisone taper which she thinks is helping.   - resume inhalers  - continue prednisone 5 mg BID for now    Hx encephalopathy with aphasia  In 11/2019. She states was 2/2 allergies.     DVT Prophylaxis: heparin gtt  Code Status: Full Code    Disposition: Expected discharge in 2-4 days     Tejinder Ovalles MD, MD  [phone] (P)  Text Page     Primary Care Physician   Lucia Lind    Chief Complaint   Chest pain    History is obtained from the patient and medical records    History of Present Illness   Kalani Rowan is a 71 year old female who presents with chest pain.  She reports that she had COVID which was diagnosed in mid July.  She has had intermittent fever since then.  She has also been suffering from shortness of breath for which she has been to the urgent care and emergency department.  She is also seen her primary and ID for  "this.  She was prescribed prednisone, albuterol and Advair for this.  She states this is helping.  On the evening of presentation she states she developed sternal pain.  It felt almost identical to her heartburn so she took some Tums.  However, this did not help.  Around this time she checked her blood pressure it was 194/85.  Her , who has heart issues, has nitroglycerin so we gave her 2 tablets of this sequentially which appeared to help.  She also was given aspirin 162 mg.  She has had some associated nausea but no shortness of breath.  She had no diaphoresis.  Chest pain did not radiate.  She does note that she has had fatigue with ambulation.  She has had chest pain in the past similar to this but is always relieved by Tums, this is the first time it has not been.  Denies any abdominal pain.  Denies edema.  She has never any heart problems.  She did have an episode of syncope this evening as well.  When she was going to the bathroom she was not feeling well she sat on a stool.  Her  states she then lost consciousness for 2 to 3 minutes.  This was after the nitroglycerin was given.  She had no head trauma.  She denied any palpitations.  She has no history of syncope.    Past Medical History    I have reviewed this patient's medical history and updated it with pertinent information if needed.   Past Medical History:   Diagnosis Date     Allergy, unspecified not elsewhere classified        Past Surgical History   I have reviewed this patient's surgical history and updated it with pertinent information if needed.  Past Surgical History:   Procedure Laterality Date     [facility] NONSPECIFIC PROCEDURE      S/P Right Tracheotomy Age 6 in [place] \"choking on nuts\"     [facility] NONSPECIFIC PROCEDURE      Left Foot Cyst       Prior to Admission Medications   Prior to Admission Medications   Prescriptions Last Dose Informant Patient Reported? Taking?   albuterol (PROAIR HFA) 108 (90 Base) MCG/ACT inhaler   No No   Sig: " Inhale 1-2 puffs into the lungs every 6 hours as needed for shortness of breath / dyspnea   albuterol (PROVENTIL) (2.5 MG/3ML) 0.083% neb solution 8/31/2022 at am  Yes Yes   Sig: Take 2.5 mg by nebulization every 6 hours as needed for shortness of breath / dyspnea or wheezing   fluticasone-salmeterol (ADVAIR-HFA) 230-21 MCG/ACT inhaler 8/31/2022 at am  No Yes   Sig: Inhale 2 puffs into the lungs 2 times daily   predniSONE (DELTASONE) 10 MG tablet 8/31/2022 at am  Yes Yes   Sig: Take by mouth 2 times daily Taper: 10 mg twice daily 8/31/22 and 9/1/22, then 5 mg twice daily for 5 days      Facility-Administered Medications: None     Allergies   Allergies   Allergen Reactions     Codeine      Elderberry [Sambucus Canadensis]      Elderberry syrup- ER.        Social History   I have reviewed this patient's social history and updated it with pertinent information if needed. Kalani Rowan  reports that she has never smoked. She has never used smokeless tobacco. She reports that she does not drink alcohol and does not use drugs.    Family History   I have reviewed this patient's family history and updated it with pertinent information if needed.   Family History   Problem Relation Age of Onset     Colon Cancer Mother      Other Cancer Father         skin     Heart Failure Father        Review of Systems   The 10 point Review of Systems is negative other than noted in the HPI or here.     Physical Exam   Temp: 98.4  F (36.9  C) Temp src: Oral BP: (!) 146/82 Pulse: 73   Resp: 21 SpO2: 98 % O2 Device: None (Room air)    Vital Signs with Ranges  0 lbs 0 oz    Constitutional: alert, oriented and in no acute distress  Eyes: EOMI, PERRL  HEENT: OP clear  Respiratory: CTA B without w/c  Cardiovascular: RRR no murmur. no edema.  GI: soft, nontender, nondistended, BS present  Lymph/Hematologic: no cervical LAD  Genitourinary: deferred  Skin: no rashes or lesions grossly  Musculoskeletal: no deformities or arthritis  Neurologic: CN  II-XII, HOLLY  Psychiatric: mood and affect wnl    Data   Data reviewed today:  I personally reviewed the EKG tracing showing nonspecific changes and the chest x-ray image(s) showing clear lungs.  Recent Labs   Lab 08/31/22 2102   WBC 13.4*   HGB 13.7   MCV 89      *   POTASSIUM 3.9   CHLORIDE 99   CO2 23   BUN 22   CR 0.58   ANIONGAP 10   APRIL 9.0   *   ALBUMIN 3.1*   PROTTOTAL 6.5*   BILITOTAL 0.2   ALKPHOS 92   ALT 36   AST 13   LIPASE 310       Recent Results (from the past 24 hour(s))   Chest XR,  PA & LAT    Narrative    EXAM: XR CHEST 2 VIEWS  LOCATION: [facility]  DATE/TIME: 8/31/2022 9:13 PM    INDICATION: Chest pain.  COMPARISON: CT exam of the chest performed 08/12/2022.      Impression    IMPRESSION: Heart size is normal. No pleural effusion, pneumothorax, or abnormal area of consolidation.

## 2022-09-01 NOTE — ED NOTES
Dr. Ovalles paged regarding critical delta troponin result. VORB to plan for angiogram in the morning.  Continue to monitor symptoms.     Pt and spouse informed regarding the delta trop result.  Pt states that chest pain is at 1-2/10 with nitroglycerin at 10 mcg/min. Headache is gone following Tylenol.  Heparin infusing at 700 Units/hr. Anti Xa draw due at 4:18 am.    SBP's stable 140's 150's.  HR at 60-70's bpm.  Sat's >90% at 2 LPM/ NC.    Shira Santiago RN,.......................................... 9/1/2022   2:50 AM

## 2022-09-01 NOTE — CONSULTS
CARDIOTHORACIC SURGERY CONSULT NOTE  9/1/2022      Reason for Consult: Severe multivessel coronary artery disease      ASSESSMENT/PLAN: Patient is a 71 year old female with a history of NSTEMI in the setting of severe multivessel coronary artery disease.     - We discussed coronary artery bypass grafting in detail with the patient and her . The patient and her  want to give it a thought and will let us know if they decide to proceed with surgery.   - If she opts to undergo surgery, we will need lower extremity vein mapping studies.   - Other cares per primary team  - Thank you for the opportunity to participate in the care of this patient.    Patient seen with Dr. Brooks, who agreed with the above plans.     STS Risk Calculator:     Risk of Mortality:  3.928%  Renal Failure:  2.071%  Permanent Stroke:  1.169%  Prolonged Ventilation:  13.525%  DSW Infection:  0.140%  Reoperation:  4.860%  Morbidity or Mortality:  18.676%  Short Length of Stay:  26.477%  Long Length of Stay:  8.061%    Lalita Carney MD   Cardiothoracic Surgery Fellow  September 1, 2022 11:42 AM   p: [phone]      ________________________________________________________________________________________________    HPI:   Ms Rowan is a 71 year old female with a history of asthma, hypertension, hyperlipidemia, COVID-19 (7/22/2022) who was admitted yesterday on account of chest pain secondary to an NSTEMI. Initial troponin was 165 ng/L and continues to trend up, now 12,068 ng/L. She underwent a coronary angiogram today that revealed significant multivessel CAD involving the circumflex, right coronary, diagonal and LAD arteries. She is currently on a heparin drip. She has had no previous chest or lower extremity surgeries. CT surgery was consulted for possible CABG.    Following her COVID infection, she was placed on prednisone for persistent symptoms of dyspnea on 8/18/2022 and is currently on a taper (currently receiving 5 mg BID). She is  "a retired nurse. On evaluation, she had no symptoms of chest pain, shortness of breath, lower extremity swelling.     PMH:  Past Medical History:   Diagnosis Date     Allergy, unspecified not elsewhere classified    COVID-19   Hypertension  Hyperlipidemia  Asthma    PSH:  Past Surgical History:   Procedure Laterality Date     [facility] NONSPECIFIC PROCEDURE      S/P Right Tracheotomy Age 6 in [place] \"choking on nuts\"     [facility] NONSPECIFIC PROCEDURE      Left Foot Cyst       FH:  Family History   Problem Relation Age of Onset     Colon Cancer Mother      Other Cancer Father         skin     Heart Failure Father        SH:  Social History     Socioeconomic History     Marital status:    Tobacco Use     Smoking status: Never Smoker     Smokeless tobacco: Never Used   Vaping Use     Vaping Use: Never used   Substance and Sexual Activity     Alcohol use: No     Alcohol/week: 0.0 standard drinks     Drug use: Never     Sexual activity: Yes     Partners: Male       Home Meds:  Facility-Administered Medications Prior to Admission   Medication Dose Route Frequency Provider Last Rate Last Admin     [DISCONTINUED] sodium chloride (PF) 0.9% PF flush 3 mL  3 mL Intravenous q1 min prn Tessa Soler PA-C         Medications Prior to Admission   Medication Sig Dispense Refill Last Dose     albuterol (PROVENTIL) (2.5 MG/3ML) 0.083% neb solution Take 2.5 mg by nebulization every 6 hours as needed for shortness of breath / dyspnea or wheezing   8/31/2022 at am     fluticasone-salmeterol (ADVAIR-HFA) 230-21 MCG/ACT inhaler Inhale 2 puffs into the lungs 2 times daily 12 g 3 8/31/2022 at am     predniSONE (DELTASONE) 10 MG tablet Take by mouth 2 times daily Taper: 10 mg twice daily 8/31/22 and 9/1/22, then 5 mg twice daily for 5 days   8/31/2022 at am     albuterol (PROAIR HFA) 108 (90 Base) MCG/ACT inhaler Inhale 1-2 puffs into the lungs every 6 hours as needed for shortness of breath / dyspnea 18 g 4  "     Allergies:  Allergies   Allergen Reactions     Codeine      Elderberry [Sambucus Canadensis]      Elderberry syrup- ER.      ROS:   Constitutional: No fevers, chills, or night sweats.No recent weight changes.    Neurologic: No new visual or hearing complaints. No new headaches.   Respiratory: No sore throat or nasal congestion. No SOB, cough, or wheezing.    Cardiovascular: No CP, palpitations, syncopal episodes, or dependent edema.    Musculoskeletal: No new muscle or joint pain.  No weakness, numbness, or tingling of extremities. Psychiatry: No changes in memory, mood, or affect.   Integumentary: No new rashes or bruises.     Physical Exam:  Temp:  [97.8  F (36.6  C)-98.6  F (37  C)] 97.8  F (36.6  C)  Pulse:  [] 65  Resp:  [11-33] 20  BP: ()/() 103/59  SpO2:  [94 %-100 %] 96 %     Gen: NAD, resting comfortably in bed, conversational  HEENT: normocephalic, atraumatic cranium, EOMI  Lungs: Good air entry bilaterally, on room air  CV: RRR, palpable peripheral pulses   Abd: Full, soft, moves with respiration.   Musculoskeletal: grossly intact, strength 5/5 upper and lower extremities, no pedal edema  Neuro: AOx3, No focal deficits  Mental: normal mood and affect, regular rate of speech    Labs:    CBC  Recent Labs   Lab 08/31/22 2102   WBC 13.4*   HGB 13.7        BMP  Recent Labs   Lab 08/31/22 2102   *   POTASSIUM 3.9   CHLORIDE 99   CO2 23   BUN 22   CR 0.58   *     LFT  Recent Labs   Lab 08/31/22 2102   AST 13   ALT 36   ALKPHOS 92   BILITOTAL 0.2   ALBUMIN 3.1*     Pancreas  Recent Labs   Lab 08/31/22 2102   LIPASE 310       Imaging:  Recent Results (from the past 24 hour(s))   Chest XR,  PA & LAT    Narrative    EXAM: XR CHEST 2 VIEWS  LOCATION: [facility]  DATE/TIME: 8/31/2022 9:13 PM    INDICATION: Chest pain.  COMPARISON: CT exam of the chest performed 08/12/2022.      Impression    IMPRESSION: Heart size is normal. No pleural effusion,  pneumothorax, or abnormal area of consolidation.   Echocardiogram Complete   Result Value    LVEF  45-50%    Narrative    [id number]  [id number]  [id number]^FATMATA^ANTOINETTE^MIGUEL ANGEL     [facility]  Echocardiography Laboratory  [facility] [place] [address]     Name: KATERIN LUNA  MRN: [id number]  : 1950  Study Date: 2022 08:00 AM  Age: 71 yrs  Gender: Female  Patient Location: [facility]  Reason For Study: MI - Acute  Ordering Physician: ANTOINETTE AVILEZ  Performed By: Pinky Mireles     BSA: 1.5 m2  Height: 59 in  Weight: 128 lb  HR: 80  BP: 153/92 mmHg  ______________________________________________________________________________  Procedure  Complete Portable Echo Adult. Optison (NDC #[id number]) given intravenously.  ______________________________________________________________________________  Interpretation Summary     Left ventricular systolic function is mildly reduced.The visual ejection  fraction is 45-50%.There is severe apical wall hypokinesis.Mid to distal  septal wall severe hypokinesia,  The right ventricular systolic function is normal.  There is trace aortic regurgitation.  The inferior vena cava was normal in size with preserved respiratory  variability.     Echo dated 2019 LVEF was noted 65% with normal wall motion.     ______________________________________________________________________________  Left Ventricle  The left ventricle is normal in size. There is normal left ventricular wall  thickness. Diastolic Doppler findings (E/E' ratio and/or other parameters)  suggest left ventricular filling pressures are increased. Left ventricular  systolic function is mildly reduced. The visual ejection fraction is 45-50%.  There is severe apical wall hypokinesis. Mid to distal septal wall severe  hypokinesia,.     Right Ventricle  The right ventricle is normal size. The right ventricular systolic function is  normal.     Atria  The left atrium is  mildly dilated. Right atrial size is normal. There is no  color Doppler evidence of an atrial shunt.     Mitral Valve  There is mild mitral annular calcification. There is trace mitral  regurgitation.     Tricuspid Valve  There is trace tricuspid regurgitation. Right ventricular systolic pressure  could not be approximated due to inadequate tricuspid regurgitation.     Aortic Valve  The aortic valve is trileaflet. There is trace aortic regurgitation. No aortic  stenosis is present.     Pulmonic Valve  There is no pulmonic valvular stenosis.     Vessels  The aortic root is normal size. Normal size ascending aorta. The inferior vena  cava was normal in size with preserved respiratory variability.     Pericardium  There is no pericardial effusion.     Rhythm  Sinus rhythm was noted.  ______________________________________________________________________________  MMode/2D Measurements & Calculations     IVSd: 0.93 cm  LVIDd: 4.2 cm  LVIDs: 3.1 cm  LVPWd: 0.82 cm  FS: 26.2 %  LV mass(C)d: 114.2 grams  LV mass(C)dI: 74.9 grams/m2  Ao root diam: 2.8 cm  LA dimension: 2.9 cm  asc Aorta Diam: 2.6 cm  LA/Ao: 1.0  LVOT diam: 1.8 cm  LVOT area: 2.5 cm2  LA Volume (BP): 29.2 ml  LA Volume Index (BP): 19.1 ml/m2  RWT: 0.39     Doppler Measurements & Calculations  MV E max rashard: 63.6 cm/sec  MV A max rashard: 115.0 cm/sec  MV E/A: 0.55  MV dec time: 0.16 sec  Ao V2 max: 163.0 cm/sec  Ao max P.0 mmHg  Ao V2 mean: 109.0 cm/sec  Ao mean P.0 mmHg  Ao V2 VTI: 31.3 cm  MARTIN(I,D): 1.7 cm2  MARTIN(V,D): 1.7 cm2  LV V1 max P.9 mmHg  LV V1 max: 111.0 cm/sec  LV V1 VTI: 21.5 cm  SV(LVOT): 54.7 ml  SI(LVOT): 35.9 ml/m2  PA acc time: 0.13 sec  AV Rashard Ratio (DI): 0.68  MARTIN Index (cm2/m2): 1.1  E/E' av.3  Lateral E/e': 7.8  Medial E/e': 8.7     ______________________________________________________________________________  Report approved by: Radha Bird 2022 10:13 AM         Cardiac Catheterization    Narrative      Prox  RCA to Mid RCA lesion is 99% stenosed.    Prox LAD to Mid LAD lesion is 50% stenosed.    Mid LAD lesion is 99% stenosed.    Prox Cx to Mid Cx lesion is 95% stenosed.         Chest X-ray (8/31/2022):     IMPRESSION: Heart size is normal. No pleural effusion, pneumothorax, or abnormal area of consolidation.    CT PE (8/12/2022):     ANGIOGRAM CHEST: No evidence for pulmonary embolism. Pulmonary arteries normal in caliber. Thoracic aorta normal in caliber. No aortic dissection or other acute abnormality. Aortic root obscured by motion.     HEART: Cardiac chambers within normal limits. No pericardial effusion. No coronary artery calcification.     MEDIASTINUM: No adenopathy or mass. Small hiatal hernia.     LUNGS AND PLEURA: No pulmonary mass, consolidation, or suspicious pulmonary nodule. No pleural effusion or pneumothorax.     LIMITED UPPER ABDOMEN: Negative.     MUSCULOSKELETAL: Negative.                                                                      IMPRESSION:  1.  No evidence for pulmonary embolism.

## 2022-09-01 NOTE — PHARMACY-ADMISSION MEDICATION HISTORY
Pharmacy Medication History  Admission medication history interview status for the 8/31/2022  admission is complete. See EPIC admission navigator for prior to admission medications     Location of Interview: Patient room  Medication history sources: Patient and Surescripts    Significant changes made to the medication list:  Changed prednisone    In the past week, patient estimated taking medication this percent of the time: greater than 90%    Additional medication history information:   none    Medication reconciliation completed by provider prior to medication history? No    Time spent in this activity: 15 minutes    Prior to Admission medications    Medication Sig Last Dose Taking? Auth Provider Long Term End Date   albuterol (PROVENTIL) (2.5 MG/3ML) 0.083% neb solution Take 2.5 mg by nebulization every 6 hours as needed for shortness of breath / dyspnea or wheezing 8/31/2022 at am Yes Unknown, Entered By History Yes    fluticasone-salmeterol (ADVAIR-HFA) 230-21 MCG/ACT inhaler Inhale 2 puffs into the lungs 2 times daily 8/31/2022 at am Yes Lucia Lind MD Yes    predniSONE (DELTASONE) 10 MG tablet Take by mouth 2 times daily Taper: 10 mg twice daily 8/31/22 and 9/1/22, then 5 mg twice daily for 5 days 8/31/2022 at am Yes Unknown, Entered By History     albuterol (PROAIR HFA) 108 (90 Base) MCG/ACT inhaler Inhale 1-2 puffs into the lungs every 6 hours as needed for shortness of breath / dyspnea   Erum Castro APRN CNP Yes        The information provided in this note is only as accurate as the sources available at the time of update(s)

## 2022-09-01 NOTE — ED NOTES
Pt given 0.4 of sublingual nitrogen and was very sensitive to it. BP went low, patient head to lowered and she recovered well. Will keep monitoring.

## 2022-09-02 ENCOUNTER — ANESTHESIA (OUTPATIENT)
Dept: SURGERY | Facility: CLINIC | Age: 72
DRG: 234 | End: 2022-09-02
Payer: COMMERCIAL

## 2022-09-02 ENCOUNTER — APPOINTMENT (OUTPATIENT)
Dept: GENERAL RADIOLOGY | Facility: CLINIC | Age: 72
DRG: 234 | End: 2022-09-02
Attending: SURGERY
Payer: COMMERCIAL

## 2022-09-02 ENCOUNTER — APPOINTMENT (OUTPATIENT)
Dept: GENERAL RADIOLOGY | Facility: CLINIC | Age: 72
DRG: 234 | End: 2022-09-02
Attending: PHYSICIAN ASSISTANT
Payer: COMMERCIAL

## 2022-09-02 LAB
ALBUMIN SERPL-MCNC: 2.6 G/DL (ref 3.4–5)
ALBUMIN SERPL-MCNC: 3.3 G/DL (ref 3.4–5)
ALP SERPL-CCNC: 48 U/L (ref 40–150)
ALP SERPL-CCNC: 50 U/L (ref 40–150)
ALT SERPL W P-5'-P-CCNC: 34 U/L (ref 0–50)
ALT SERPL W P-5'-P-CCNC: 36 U/L (ref 0–50)
ANION GAP SERPL CALCULATED.3IONS-SCNC: 4 MMOL/L (ref 3–14)
ANION GAP SERPL CALCULATED.3IONS-SCNC: 5 MMOL/L (ref 3–14)
ANION GAP SERPL CALCULATED.3IONS-SCNC: 6 MMOL/L (ref 3–14)
ANION GAP SERPL CALCULATED.3IONS-SCNC: 7 MMOL/L (ref 3–14)
APTT PPP: 30 SECONDS (ref 22–38)
APTT PPP: 50 SECONDS (ref 22–38)
AST SERPL W P-5'-P-CCNC: 55 U/L (ref 0–45)
AST SERPL W P-5'-P-CCNC: 68 U/L (ref 0–45)
ATRIAL RATE - MUSE: 102 BPM
ATRIAL RATE - MUSE: 105 BPM
BASE EXCESS BLDA CALC-SCNC: 1.5 MMOL/L (ref -9–1.8)
BASE EXCESS BLDA CALC-SCNC: 2.3 MMOL/L (ref -9–1.8)
BASE EXCESS BLDV CALC-SCNC: 2.4 MMOL/L (ref -7.7–1.9)
BASOPHILS # BLD AUTO: 0 10E3/UL (ref 0–0.2)
BASOPHILS NFR BLD AUTO: 0 %
BILIRUB SERPL-MCNC: 0.6 MG/DL (ref 0.2–1.3)
BILIRUB SERPL-MCNC: 0.7 MG/DL (ref 0.2–1.3)
BLD PROD TYP BPU: NORMAL
BLD PROD TYP BPU: NORMAL
BLOOD COMPONENT TYPE: NORMAL
BLOOD COMPONENT TYPE: NORMAL
BUN SERPL-MCNC: 13 MG/DL (ref 7–30)
BUN SERPL-MCNC: 13 MG/DL (ref 7–30)
BUN SERPL-MCNC: 14 MG/DL (ref 7–30)
BUN SERPL-MCNC: 20 MG/DL (ref 7–30)
CA-I BLD-MCNC: 4.6 MG/DL (ref 4.4–5.2)
CA-I BLD-MCNC: 4.7 MG/DL (ref 4.4–5.2)
CALCIUM SERPL-MCNC: 8.1 MG/DL (ref 8.5–10.1)
CALCIUM SERPL-MCNC: 8.2 MG/DL (ref 8.5–10.1)
CALCIUM SERPL-MCNC: 8.6 MG/DL (ref 8.5–10.1)
CALCIUM SERPL-MCNC: 9 MG/DL (ref 8.5–10.1)
CHLORIDE BLD-SCNC: 110 MMOL/L (ref 94–109)
CHLORIDE BLD-SCNC: 111 MMOL/L (ref 94–109)
CHLORIDE BLD-SCNC: 114 MMOL/L (ref 94–109)
CHLORIDE BLD-SCNC: 115 MMOL/L (ref 94–109)
CO2 SERPL-SCNC: 23 MMOL/L (ref 20–32)
CO2 SERPL-SCNC: 23 MMOL/L (ref 20–32)
CO2 SERPL-SCNC: 24 MMOL/L (ref 20–32)
CO2 SERPL-SCNC: 26 MMOL/L (ref 20–32)
CODING SYSTEM: NORMAL
CODING SYSTEM: NORMAL
CREAT SERPL-MCNC: 0.51 MG/DL (ref 0.52–1.04)
CREAT SERPL-MCNC: 0.59 MG/DL (ref 0.52–1.04)
CREAT SERPL-MCNC: 0.68 MG/DL (ref 0.52–1.04)
CREAT SERPL-MCNC: 0.69 MG/DL (ref 0.52–1.04)
CROSSMATCH: NORMAL
CROSSMATCH: NORMAL
DIASTOLIC BLOOD PRESSURE - MUSE: NORMAL MMHG
DIASTOLIC BLOOD PRESSURE - MUSE: NORMAL MMHG
EOSINOPHIL # BLD AUTO: 0 10E3/UL (ref 0–0.7)
EOSINOPHIL NFR BLD AUTO: 0 %
ERYTHROCYTE [DISTWIDTH] IN BLOOD BY AUTOMATED COUNT: 13 % (ref 10–15)
ERYTHROCYTE [DISTWIDTH] IN BLOOD BY AUTOMATED COUNT: 13.1 % (ref 10–15)
ERYTHROCYTE [DISTWIDTH] IN BLOOD BY AUTOMATED COUNT: 13.2 % (ref 10–15)
FIBRINOGEN PPP-MCNC: 156 MG/DL (ref 170–490)
GFR SERPL CREATININE-BSD FRML MDRD: >90 ML/MIN/1.73M2
GLUCOSE BLD-MCNC: 103 MG/DL (ref 70–99)
GLUCOSE BLD-MCNC: 154 MG/DL (ref 70–99)
GLUCOSE BLD-MCNC: 162 MG/DL (ref 70–99)
GLUCOSE BLD-MCNC: 172 MG/DL (ref 70–99)
GLUCOSE BLDC GLUCOMTR-MCNC: 122 MG/DL (ref 70–99)
GLUCOSE BLDC GLUCOMTR-MCNC: 131 MG/DL (ref 70–99)
GLUCOSE BLDC GLUCOMTR-MCNC: 134 MG/DL (ref 70–99)
GLUCOSE BLDC GLUCOMTR-MCNC: 157 MG/DL (ref 70–99)
GLUCOSE BLDC GLUCOMTR-MCNC: 159 MG/DL (ref 70–99)
GLUCOSE BLDC GLUCOMTR-MCNC: 161 MG/DL (ref 70–99)
HBA1C MFR BLD: 6 % (ref 0–5.6)
HCO3 BLD-SCNC: 24 MMOL/L (ref 21–28)
HCO3 BLD-SCNC: 26 MMOL/L (ref 21–28)
HCO3 BLDV-SCNC: 26 MMOL/L (ref 21–28)
HCT VFR BLD AUTO: 23.8 % (ref 35–47)
HCT VFR BLD AUTO: 31.6 % (ref 35–47)
HCT VFR BLD AUTO: 42.2 % (ref 35–47)
HGB BLD-MCNC: 10.7 G/DL (ref 11.7–15.7)
HGB BLD-MCNC: 13.8 G/DL (ref 11.7–15.7)
HGB BLD-MCNC: 7.9 G/DL (ref 11.7–15.7)
IMM GRANULOCYTES # BLD: 0 10E3/UL
IMM GRANULOCYTES NFR BLD: 0 %
INR PPP: 1.26 (ref 0.85–1.15)
INR PPP: 1.55 (ref 0.85–1.15)
INTERPRETATION ECG - MUSE: NORMAL
INTERPRETATION ECG - MUSE: NORMAL
ISSUE DATE AND TIME: NORMAL
ISSUE DATE AND TIME: NORMAL
LACTATE SERPL-SCNC: 1.3 MMOL/L (ref 0.7–2)
LYMPHOCYTES # BLD AUTO: 2.9 10E3/UL (ref 0.8–5.3)
LYMPHOCYTES NFR BLD AUTO: 29 %
MAGNESIUM SERPL-MCNC: 2.5 MG/DL (ref 1.6–2.3)
MAGNESIUM SERPL-MCNC: 3 MG/DL (ref 1.6–2.3)
MCH RBC QN AUTO: 29.6 PG (ref 26.5–33)
MCH RBC QN AUTO: 30.3 PG (ref 26.5–33)
MCH RBC QN AUTO: 30.3 PG (ref 26.5–33)
MCHC RBC AUTO-ENTMCNC: 32.7 G/DL (ref 31.5–36.5)
MCHC RBC AUTO-ENTMCNC: 33.2 G/DL (ref 31.5–36.5)
MCHC RBC AUTO-ENTMCNC: 33.9 G/DL (ref 31.5–36.5)
MCV RBC AUTO: 90 FL (ref 78–100)
MCV RBC AUTO: 91 FL (ref 78–100)
MCV RBC AUTO: 91 FL (ref 78–100)
MONOCYTES # BLD AUTO: 0.9 10E3/UL (ref 0–1.3)
MONOCYTES NFR BLD AUTO: 9 %
NEUTROPHILS # BLD AUTO: 6.1 10E3/UL (ref 1.6–8.3)
NEUTROPHILS NFR BLD AUTO: 62 %
NRBC # BLD AUTO: 0 10E3/UL
NRBC BLD AUTO-RTO: 0 /100
O2/TOTAL GAS SETTING VFR VENT: 40 %
O2/TOTAL GAS SETTING VFR VENT: 50 %
O2/TOTAL GAS SETTING VFR VENT: 50 %
OXYHGB MFR BLD: 98 % (ref 92–100)
OXYHGB MFR BLDV: 65 % (ref 70–75)
P AXIS - MUSE: 69 DEGREES
P AXIS - MUSE: 77 DEGREES
PCO2 BLD: 29 MM HG (ref 35–45)
PCO2 BLD: 38 MM HG (ref 35–45)
PCO2 BLDV: 35 MM HG (ref 40–50)
PH BLD: 7.45 [PH] (ref 7.35–7.45)
PH BLD: 7.52 [PH] (ref 7.35–7.45)
PH BLDV: 7.48 [PH] (ref 7.32–7.43)
PHOSPHATE SERPL-MCNC: 2.4 MG/DL (ref 2.5–4.5)
PHOSPHATE SERPL-MCNC: 3.7 MG/DL (ref 2.5–4.5)
PLATELET # BLD AUTO: 162 10E3/UL (ref 150–450)
PLATELET # BLD AUTO: 191 10E3/UL (ref 150–450)
PLATELET # BLD AUTO: 288 10E3/UL (ref 150–450)
PO2 BLD: 136 MM HG (ref 80–105)
PO2 BLD: 152 MM HG (ref 80–105)
PO2 BLDV: 32 MM HG (ref 25–47)
POTASSIUM BLD-SCNC: 3.3 MMOL/L (ref 3.4–5.3)
POTASSIUM BLD-SCNC: 3.8 MMOL/L (ref 3.4–5.3)
POTASSIUM BLD-SCNC: 3.8 MMOL/L (ref 3.4–5.3)
POTASSIUM BLD-SCNC: 3.9 MMOL/L (ref 3.4–5.3)
POTASSIUM BLD-SCNC: 4.2 MMOL/L (ref 3.4–5.3)
PR INTERVAL - MUSE: 136 MS
PR INTERVAL - MUSE: 138 MS
PROT SERPL-MCNC: 4.6 G/DL (ref 6.8–8.8)
PROT SERPL-MCNC: 5.3 G/DL (ref 6.8–8.8)
QRS DURATION - MUSE: 76 MS
QRS DURATION - MUSE: 82 MS
QT - MUSE: 368 MS
QT - MUSE: 392 MS
QTC - MUSE: 486 MS
QTC - MUSE: 510 MS
R AXIS - MUSE: -16 DEGREES
R AXIS - MUSE: -16 DEGREES
RBC # BLD AUTO: 2.61 10E6/UL (ref 3.8–5.2)
RBC # BLD AUTO: 3.53 10E6/UL (ref 3.8–5.2)
RBC # BLD AUTO: 4.66 10E6/UL (ref 3.8–5.2)
SODIUM SERPL-SCNC: 140 MMOL/L (ref 133–144)
SODIUM SERPL-SCNC: 141 MMOL/L (ref 133–144)
SODIUM SERPL-SCNC: 143 MMOL/L (ref 133–144)
SODIUM SERPL-SCNC: 144 MMOL/L (ref 133–144)
SYSTOLIC BLOOD PRESSURE - MUSE: NORMAL MMHG
SYSTOLIC BLOOD PRESSURE - MUSE: NORMAL MMHG
T AXIS - MUSE: 18 DEGREES
T AXIS - MUSE: 5 DEGREES
UFH PPP CHRO-ACNC: <0.1 IU/ML
UNIT ABO/RH: NORMAL
UNIT ABO/RH: NORMAL
UNIT NUMBER: NORMAL
UNIT NUMBER: NORMAL
UNIT STATUS: NORMAL
UNIT STATUS: NORMAL
UNIT TYPE ISBT: 6200
UNIT TYPE ISBT: 6200
VENTRICULAR RATE- MUSE: 102 BPM
VENTRICULAR RATE- MUSE: 105 BPM
WBC # BLD AUTO: 10 10E3/UL (ref 4–11)
WBC # BLD AUTO: 17.2 10E3/UL (ref 4–11)
WBC # BLD AUTO: 22.3 10E3/UL (ref 4–11)

## 2022-09-02 PROCEDURE — 94002 VENT MGMT INPAT INIT DAY: CPT

## 2022-09-02 PROCEDURE — 250N000012 HC RX MED GY IP 250 OP 636 PS 637: Performed by: SURGERY

## 2022-09-02 PROCEDURE — 258N000003 HC RX IP 258 OP 636: Performed by: SURGERY

## 2022-09-02 PROCEDURE — 84100 ASSAY OF PHOSPHORUS: CPT | Performed by: PHYSICIAN ASSISTANT

## 2022-09-02 PROCEDURE — 250N000011 HC RX IP 250 OP 636: Performed by: THORACIC SURGERY (CARDIOTHORACIC VASCULAR SURGERY)

## 2022-09-02 PROCEDURE — 83605 ASSAY OF LACTIC ACID: CPT | Performed by: PHYSICIAN ASSISTANT

## 2022-09-02 PROCEDURE — P9016 RBC LEUKOCYTES REDUCED: HCPCS | Performed by: THORACIC SURGERY (CARDIOTHORACIC VASCULAR SURGERY)

## 2022-09-02 PROCEDURE — 250N000009 HC RX 250: Performed by: THORACIC SURGERY (CARDIOTHORACIC VASCULAR SURGERY)

## 2022-09-02 PROCEDURE — 5A1221Z PERFORMANCE OF CARDIAC OUTPUT, CONTINUOUS: ICD-10-PCS | Performed by: SURGERY

## 2022-09-02 PROCEDURE — 258N000003 HC RX IP 258 OP 636: Performed by: NURSE ANESTHETIST, CERTIFIED REGISTERED

## 2022-09-02 PROCEDURE — 82330 ASSAY OF CALCIUM: CPT | Performed by: SURGERY

## 2022-09-02 PROCEDURE — 36415 COLL VENOUS BLD VENIPUNCTURE: CPT | Performed by: INTERNAL MEDICINE

## 2022-09-02 PROCEDURE — 33508 ENDOSCOPIC VEIN HARVEST: CPT | Mod: XU | Performed by: SURGERY

## 2022-09-02 PROCEDURE — C1898 LEAD, PMKR, OTHER THAN TRANS: HCPCS | Performed by: SURGERY

## 2022-09-02 PROCEDURE — 250N000013 HC RX MED GY IP 250 OP 250 PS 637: Performed by: PHYSICIAN ASSISTANT

## 2022-09-02 PROCEDURE — 410N000005 HC PER-PERFUSION, SH ONLY, 1ST 30 MIN: Performed by: SURGERY

## 2022-09-02 PROCEDURE — 250N000009 HC RX 250: Performed by: PHYSICIAN ASSISTANT

## 2022-09-02 PROCEDURE — 83735 ASSAY OF MAGNESIUM: CPT | Performed by: PHYSICIAN ASSISTANT

## 2022-09-02 PROCEDURE — 33533 CABG ARTERIAL SINGLE: CPT | Performed by: SURGERY

## 2022-09-02 PROCEDURE — 250N000011 HC RX IP 250 OP 636: Performed by: SURGERY

## 2022-09-02 PROCEDURE — 999N000157 HC STATISTIC RCP TIME EA 10 MIN

## 2022-09-02 PROCEDURE — 84132 ASSAY OF SERUM POTASSIUM: CPT | Performed by: SURGERY

## 2022-09-02 PROCEDURE — 93005 ELECTROCARDIOGRAM TRACING: CPT

## 2022-09-02 PROCEDURE — P9045 ALBUMIN (HUMAN), 5%, 250 ML: HCPCS

## 2022-09-02 PROCEDURE — 250N000011 HC RX IP 250 OP 636: Performed by: PHYSICIAN ASSISTANT

## 2022-09-02 PROCEDURE — 85610 PROTHROMBIN TIME: CPT | Performed by: SURGERY

## 2022-09-02 PROCEDURE — 258N000003 HC RX IP 258 OP 636: Performed by: THORACIC SURGERY (CARDIOTHORACIC VASCULAR SURGERY)

## 2022-09-02 PROCEDURE — 82310 ASSAY OF CALCIUM: CPT | Performed by: SURGERY

## 2022-09-02 PROCEDURE — 85610 PROTHROMBIN TIME: CPT | Performed by: PHYSICIAN ASSISTANT

## 2022-09-02 PROCEDURE — 93010 ELECTROCARDIOGRAM REPORT: CPT | Performed by: INTERNAL MEDICINE

## 2022-09-02 PROCEDURE — 200N000001 HC R&B ICU

## 2022-09-02 PROCEDURE — 370N000017 HC ANESTHESIA TECHNICAL FEE, PER MIN: Performed by: SURGERY

## 2022-09-02 PROCEDURE — 82805 BLOOD GASES W/O2 SATURATION: CPT | Performed by: INTERNAL MEDICINE

## 2022-09-02 PROCEDURE — 02100Z9 BYPASS CORONARY ARTERY, ONE ARTERY FROM LEFT INTERNAL MAMMARY, OPEN APPROACH: ICD-10-PCS | Performed by: SURGERY

## 2022-09-02 PROCEDURE — 84132 ASSAY OF SERUM POTASSIUM: CPT | Performed by: PHYSICIAN ASSISTANT

## 2022-09-02 PROCEDURE — 85027 COMPLETE CBC AUTOMATED: CPT | Performed by: PHYSICIAN ASSISTANT

## 2022-09-02 PROCEDURE — 250N000011 HC RX IP 250 OP 636: Performed by: ANESTHESIOLOGY

## 2022-09-02 PROCEDURE — 360N000079 HC SURGERY LEVEL 6, PER MIN: Performed by: SURGERY

## 2022-09-02 PROCEDURE — 999N000253 HC STATISTIC WEANING TRIALS

## 2022-09-02 PROCEDURE — 021109W BYPASS CORONARY ARTERY, TWO ARTERIES FROM AORTA WITH AUTOLOGOUS VENOUS TISSUE, OPEN APPROACH: ICD-10-PCS | Performed by: SURGERY

## 2022-09-02 PROCEDURE — 250N000009 HC RX 250: Performed by: NURSE ANESTHETIST, CERTIFIED REGISTERED

## 2022-09-02 PROCEDURE — 258N000003 HC RX IP 258 OP 636: Performed by: ANESTHESIOLOGY

## 2022-09-02 PROCEDURE — 99291 CRITICAL CARE FIRST HOUR: CPT | Mod: 24 | Performed by: INTERNAL MEDICINE

## 2022-09-02 PROCEDURE — 82803 BLOOD GASES ANY COMBINATION: CPT | Performed by: SURGERY

## 2022-09-02 PROCEDURE — 250N000013 HC RX MED GY IP 250 OP 250 PS 637: Performed by: THORACIC SURGERY (CARDIOTHORACIC VASCULAR SURGERY)

## 2022-09-02 PROCEDURE — 410N000006: Performed by: SURGERY

## 2022-09-02 PROCEDURE — 80053 COMPREHEN METABOLIC PANEL: CPT | Performed by: HOSPITALIST

## 2022-09-02 PROCEDURE — 84100 ASSAY OF PHOSPHORUS: CPT | Performed by: SURGERY

## 2022-09-02 PROCEDURE — 85027 COMPLETE CBC AUTOMATED: CPT | Performed by: SURGERY

## 2022-09-02 PROCEDURE — 85014 HEMATOCRIT: CPT | Performed by: HOSPITALIST

## 2022-09-02 PROCEDURE — 06BP4ZZ EXCISION OF RIGHT SAPHENOUS VEIN, PERCUTANEOUS ENDOSCOPIC APPROACH: ICD-10-PCS | Performed by: SURGERY

## 2022-09-02 PROCEDURE — 06BQ4ZZ EXCISION OF LEFT SAPHENOUS VEIN, PERCUTANEOUS ENDOSCOPIC APPROACH: ICD-10-PCS | Performed by: SURGERY

## 2022-09-02 PROCEDURE — 33518 CABG ARTERY-VEIN TWO: CPT | Performed by: SURGERY

## 2022-09-02 PROCEDURE — 83735 ASSAY OF MAGNESIUM: CPT | Performed by: SURGERY

## 2022-09-02 PROCEDURE — 85730 THROMBOPLASTIN TIME PARTIAL: CPT | Performed by: PHYSICIAN ASSISTANT

## 2022-09-02 PROCEDURE — 84155 ASSAY OF PROTEIN SERUM: CPT | Performed by: PHYSICIAN ASSISTANT

## 2022-09-02 PROCEDURE — 250N000009 HC RX 250: Performed by: SURGERY

## 2022-09-02 PROCEDURE — 999N000065 XR CHEST PORT 1 VIEW

## 2022-09-02 PROCEDURE — 82330 ASSAY OF CALCIUM: CPT | Performed by: PHYSICIAN ASSISTANT

## 2022-09-02 PROCEDURE — 999N000141 HC STATISTIC PRE-PROCEDURE NURSING ASSESSMENT: Performed by: SURGERY

## 2022-09-02 PROCEDURE — 85384 FIBRINOGEN ACTIVITY: CPT | Performed by: SURGERY

## 2022-09-02 PROCEDURE — 999N000259 HC STATISTIC EXTUBATION

## 2022-09-02 PROCEDURE — 250N000011 HC RX IP 250 OP 636: Performed by: NURSE ANESTHETIST, CERTIFIED REGISTERED

## 2022-09-02 PROCEDURE — 250N000013 HC RX MED GY IP 250 OP 250 PS 637: Performed by: INTERNAL MEDICINE

## 2022-09-02 PROCEDURE — 272N000001 HC OR GENERAL SUPPLY STERILE: Performed by: SURGERY

## 2022-09-02 PROCEDURE — 85520 HEPARIN ASSAY: CPT | Performed by: INTERNAL MEDICINE

## 2022-09-02 PROCEDURE — P9045 ALBUMIN (HUMAN), 5%, 250 ML: HCPCS | Performed by: NURSE ANESTHETIST, CERTIFIED REGISTERED

## 2022-09-02 PROCEDURE — 999N000063 XR CHEST PORT 1 VIEW

## 2022-09-02 PROCEDURE — 85730 THROMBOPLASTIN TIME PARTIAL: CPT | Performed by: SURGERY

## 2022-09-02 PROCEDURE — 250N000024 HC ISOFLURANE, PER MIN: Performed by: SURGERY

## 2022-09-02 PROCEDURE — 250N000011 HC RX IP 250 OP 636

## 2022-09-02 PROCEDURE — 83036 HEMOGLOBIN GLYCOSYLATED A1C: CPT | Performed by: SURGERY

## 2022-09-02 RX ORDER — FENTANYL CITRATE 50 UG/ML
INJECTION, SOLUTION INTRAMUSCULAR; INTRAVENOUS PRN
Status: DISCONTINUED | OUTPATIENT
Start: 2022-09-02 | End: 2022-09-02

## 2022-09-02 RX ORDER — LIDOCAINE 40 MG/G
CREAM TOPICAL
Status: DISCONTINUED | OUTPATIENT
Start: 2022-09-02 | End: 2022-09-02

## 2022-09-02 RX ORDER — CEFAZOLIN SODIUM 2 G/100ML
2 INJECTION, SOLUTION INTRAVENOUS SEE ADMIN INSTRUCTIONS
Status: DISCONTINUED | OUTPATIENT
Start: 2022-09-02 | End: 2022-09-02 | Stop reason: HOSPADM

## 2022-09-02 RX ORDER — POLYETHYLENE GLYCOL 3350 17 G/17G
17 POWDER, FOR SOLUTION ORAL DAILY
Status: DISCONTINUED | OUTPATIENT
Start: 2022-09-03 | End: 2022-09-09 | Stop reason: HOSPADM

## 2022-09-02 RX ORDER — DEXMEDETOMIDINE HYDROCHLORIDE 4 UG/ML
.2-.7 INJECTION, SOLUTION INTRAVENOUS CONTINUOUS
Status: DISCONTINUED | OUTPATIENT
Start: 2022-09-02 | End: 2022-09-05

## 2022-09-02 RX ORDER — LIDOCAINE 4 G/G
1 PATCH TOPICAL EVERY 24 HOURS
Status: DISCONTINUED | OUTPATIENT
Start: 2022-09-02 | End: 2022-09-09 | Stop reason: HOSPADM

## 2022-09-02 RX ORDER — SODIUM CHLORIDE 9 MG/ML
INJECTION, SOLUTION INTRAVENOUS CONTINUOUS
Status: DISCONTINUED | OUTPATIENT
Start: 2022-09-02 | End: 2022-09-06

## 2022-09-02 RX ORDER — CEFAZOLIN SODIUM 1 G/3ML
1 INJECTION, POWDER, FOR SOLUTION INTRAMUSCULAR; INTRAVENOUS EVERY 8 HOURS
Status: COMPLETED | OUTPATIENT
Start: 2022-09-02 | End: 2022-09-03

## 2022-09-02 RX ORDER — MAGNESIUM SULFATE HEPTAHYDRATE 40 MG/ML
INJECTION, SOLUTION INTRAVENOUS PRN
Status: DISCONTINUED | OUTPATIENT
Start: 2022-09-02 | End: 2022-09-02

## 2022-09-02 RX ORDER — ASPIRIN 81 MG/1
81 TABLET, CHEWABLE ORAL
Status: COMPLETED | OUTPATIENT
Start: 2022-09-02 | End: 2022-09-02

## 2022-09-02 RX ORDER — PROPOFOL 10 MG/ML
INJECTION, EMULSION INTRAVENOUS PRN
Status: DISCONTINUED | OUTPATIENT
Start: 2022-09-02 | End: 2022-09-02

## 2022-09-02 RX ORDER — HEPARIN SODIUM 1000 [USP'U]/ML
INJECTION, SOLUTION INTRAVENOUS; SUBCUTANEOUS PRN
Status: DISCONTINUED | OUTPATIENT
Start: 2022-09-02 | End: 2022-09-02

## 2022-09-02 RX ORDER — ONDANSETRON 2 MG/ML
4 INJECTION INTRAMUSCULAR; INTRAVENOUS EVERY 6 HOURS PRN
Status: DISCONTINUED | OUTPATIENT
Start: 2022-09-02 | End: 2022-09-09 | Stop reason: HOSPADM

## 2022-09-02 RX ORDER — VECURONIUM BROMIDE 1 MG/ML
INJECTION, POWDER, LYOPHILIZED, FOR SOLUTION INTRAVENOUS PRN
Status: DISCONTINUED | OUTPATIENT
Start: 2022-09-02 | End: 2022-09-02

## 2022-09-02 RX ORDER — NITROGLYCERIN 20 MG/100ML
INJECTION INTRAVENOUS CONTINUOUS PRN
Status: DISCONTINUED | OUTPATIENT
Start: 2022-09-02 | End: 2022-09-02

## 2022-09-02 RX ORDER — SODIUM CHLORIDE 9 MG/ML
INJECTION, SOLUTION INTRAVENOUS CONTINUOUS PRN
Status: DISCONTINUED | OUTPATIENT
Start: 2022-09-02 | End: 2022-09-02

## 2022-09-02 RX ORDER — FENTANYL CITRATE 50 UG/ML
INJECTION, SOLUTION INTRAMUSCULAR; INTRAVENOUS
Status: COMPLETED
Start: 2022-09-02 | End: 2022-09-02

## 2022-09-02 RX ORDER — CHLORHEXIDINE GLUCONATE ORAL RINSE 1.2 MG/ML
10 SOLUTION DENTAL ONCE
Status: COMPLETED | OUTPATIENT
Start: 2022-09-02 | End: 2022-09-02

## 2022-09-02 RX ORDER — LIDOCAINE 40 MG/G
CREAM TOPICAL
Status: DISCONTINUED | OUTPATIENT
Start: 2022-09-02 | End: 2022-09-09 | Stop reason: HOSPADM

## 2022-09-02 RX ORDER — NALOXONE HYDROCHLORIDE 0.4 MG/ML
0.2 INJECTION, SOLUTION INTRAMUSCULAR; INTRAVENOUS; SUBCUTANEOUS
Status: DISCONTINUED | OUTPATIENT
Start: 2022-09-02 | End: 2022-09-09 | Stop reason: HOSPADM

## 2022-09-02 RX ORDER — BISACODYL 10 MG
10 SUPPOSITORY, RECTAL RECTAL DAILY PRN
Status: DISCONTINUED | OUTPATIENT
Start: 2022-09-02 | End: 2022-09-09 | Stop reason: HOSPADM

## 2022-09-02 RX ORDER — SODIUM CHLORIDE, SODIUM LACTATE, POTASSIUM CHLORIDE, CALCIUM CHLORIDE 600; 310; 30; 20 MG/100ML; MG/100ML; MG/100ML; MG/100ML
INJECTION, SOLUTION INTRAVENOUS CONTINUOUS
Status: DISCONTINUED | OUTPATIENT
Start: 2022-09-02 | End: 2022-09-06

## 2022-09-02 RX ORDER — PROCHLORPERAZINE MALEATE 5 MG
5 TABLET ORAL EVERY 6 HOURS PRN
Status: DISCONTINUED | OUTPATIENT
Start: 2022-09-02 | End: 2022-09-09 | Stop reason: HOSPADM

## 2022-09-02 RX ORDER — FAMOTIDINE 20 MG/1
20 TABLET, FILM COATED ORAL
Status: COMPLETED | OUTPATIENT
Start: 2022-09-02 | End: 2022-09-02

## 2022-09-02 RX ORDER — NALOXONE HYDROCHLORIDE 0.4 MG/ML
0.4 INJECTION, SOLUTION INTRAMUSCULAR; INTRAVENOUS; SUBCUTANEOUS
Status: DISCONTINUED | OUTPATIENT
Start: 2022-09-02 | End: 2022-09-09 | Stop reason: HOSPADM

## 2022-09-02 RX ORDER — AMOXICILLIN 250 MG
1 CAPSULE ORAL 2 TIMES DAILY
Status: DISCONTINUED | OUTPATIENT
Start: 2022-09-02 | End: 2022-09-09 | Stop reason: HOSPADM

## 2022-09-02 RX ORDER — SODIUM CHLORIDE, SODIUM LACTATE, POTASSIUM CHLORIDE, CALCIUM CHLORIDE 600; 310; 30; 20 MG/100ML; MG/100ML; MG/100ML; MG/100ML
INJECTION, SOLUTION INTRAVENOUS CONTINUOUS PRN
Status: DISCONTINUED | OUTPATIENT
Start: 2022-09-02 | End: 2022-09-02

## 2022-09-02 RX ORDER — LIDOCAINE HYDROCHLORIDE 20 MG/ML
INJECTION, SOLUTION INFILTRATION; PERINEURAL PRN
Status: DISCONTINUED | OUTPATIENT
Start: 2022-09-02 | End: 2022-09-02

## 2022-09-02 RX ORDER — PROTAMINE SULFATE 10 MG/ML
INJECTION, SOLUTION INTRAVENOUS PRN
Status: DISCONTINUED | OUTPATIENT
Start: 2022-09-02 | End: 2022-09-02

## 2022-09-02 RX ORDER — ASPIRIN 81 MG/1
162 TABLET, CHEWABLE ORAL
Status: COMPLETED | OUTPATIENT
Start: 2022-09-02 | End: 2022-09-02

## 2022-09-02 RX ORDER — PHENYLEPHRINE HCL IN 0.9% NACL 50MG/250ML
.1-4 PLASTIC BAG, INJECTION (ML) INTRAVENOUS CONTINUOUS PRN
Status: DISCONTINUED | OUTPATIENT
Start: 2022-09-02 | End: 2022-09-05

## 2022-09-02 RX ORDER — SODIUM CHLORIDE, SODIUM LACTATE, POTASSIUM CHLORIDE, CALCIUM CHLORIDE 600; 310; 30; 20 MG/100ML; MG/100ML; MG/100ML; MG/100ML
INJECTION, SOLUTION INTRAVENOUS CONTINUOUS
Status: DISCONTINUED | OUTPATIENT
Start: 2022-09-02 | End: 2022-09-02 | Stop reason: HOSPADM

## 2022-09-02 RX ORDER — DEXTROSE MONOHYDRATE 25 G/50ML
25-50 INJECTION, SOLUTION INTRAVENOUS
Status: DISCONTINUED | OUTPATIENT
Start: 2022-09-02 | End: 2022-09-09 | Stop reason: HOSPADM

## 2022-09-02 RX ORDER — ACETAMINOPHEN 325 MG/1
650 TABLET ORAL EVERY 4 HOURS PRN
Status: DISCONTINUED | OUTPATIENT
Start: 2022-09-05 | End: 2022-09-09 | Stop reason: HOSPADM

## 2022-09-02 RX ORDER — CALCIUM GLUCONATE 20 MG/ML
1 INJECTION, SOLUTION INTRAVENOUS
Status: ACTIVE | OUTPATIENT
Start: 2022-09-02 | End: 2022-09-08

## 2022-09-02 RX ORDER — NEOSTIGMINE METHYLSULFATE 1 MG/ML
VIAL (ML) INJECTION PRN
Status: DISCONTINUED | OUTPATIENT
Start: 2022-09-02 | End: 2022-09-02

## 2022-09-02 RX ORDER — NITROGLYCERIN 10 MG/100ML
INJECTION INTRAVENOUS PRN
Status: DISCONTINUED | OUTPATIENT
Start: 2022-09-02 | End: 2022-09-02

## 2022-09-02 RX ORDER — ONDANSETRON 4 MG/1
4 TABLET, ORALLY DISINTEGRATING ORAL EVERY 6 HOURS PRN
Status: DISCONTINUED | OUTPATIENT
Start: 2022-09-02 | End: 2022-09-09 | Stop reason: HOSPADM

## 2022-09-02 RX ORDER — NICOTINE POLACRILEX 4 MG
15-30 LOZENGE BUCCAL
Status: DISCONTINUED | OUTPATIENT
Start: 2022-09-02 | End: 2022-09-09 | Stop reason: HOSPADM

## 2022-09-02 RX ORDER — NICOTINE POLACRILEX 4 MG
15-30 LOZENGE BUCCAL
Status: DISCONTINUED | OUTPATIENT
Start: 2022-09-02 | End: 2022-09-02

## 2022-09-02 RX ORDER — HYDROMORPHONE HCL IN WATER/PF 6 MG/30 ML
0.4 PATIENT CONTROLLED ANALGESIA SYRINGE INTRAVENOUS
Status: DISCONTINUED | OUTPATIENT
Start: 2022-09-02 | End: 2022-09-05

## 2022-09-02 RX ORDER — HYDRALAZINE HYDROCHLORIDE 20 MG/ML
10 INJECTION INTRAMUSCULAR; INTRAVENOUS EVERY 30 MIN PRN
Status: DISCONTINUED | OUTPATIENT
Start: 2022-09-02 | End: 2022-09-09 | Stop reason: HOSPADM

## 2022-09-02 RX ORDER — PANTOPRAZOLE SODIUM 40 MG/1
40 TABLET, DELAYED RELEASE ORAL DAILY
Status: DISCONTINUED | OUTPATIENT
Start: 2022-09-02 | End: 2022-09-09 | Stop reason: HOSPADM

## 2022-09-02 RX ORDER — FENTANYL CITRATE 0.05 MG/ML
50 INJECTION, SOLUTION INTRAMUSCULAR; INTRAVENOUS
Status: DISCONTINUED | OUTPATIENT
Start: 2022-09-02 | End: 2022-09-02 | Stop reason: HOSPADM

## 2022-09-02 RX ORDER — HEPARIN SODIUM 5000 [USP'U]/.5ML
5000 INJECTION, SOLUTION INTRAVENOUS; SUBCUTANEOUS EVERY 8 HOURS
Status: DISCONTINUED | OUTPATIENT
Start: 2022-09-03 | End: 2022-09-09 | Stop reason: HOSPADM

## 2022-09-02 RX ORDER — ACETAMINOPHEN 325 MG/1
975 TABLET ORAL EVERY 8 HOURS
Status: DISPENSED | OUTPATIENT
Start: 2022-09-02 | End: 2022-09-05

## 2022-09-02 RX ORDER — ASPIRIN 81 MG/1
162 TABLET, CHEWABLE ORAL DAILY
Status: DISCONTINUED | OUTPATIENT
Start: 2022-09-03 | End: 2022-09-09 | Stop reason: HOSPADM

## 2022-09-02 RX ORDER — GLYCOPYRROLATE 0.2 MG/ML
INJECTION, SOLUTION INTRAMUSCULAR; INTRAVENOUS PRN
Status: DISCONTINUED | OUTPATIENT
Start: 2022-09-02 | End: 2022-09-02

## 2022-09-02 RX ORDER — MUPIROCIN 20 MG/G
0.5 OINTMENT TOPICAL 2 TIMES DAILY
Status: COMPLETED | OUTPATIENT
Start: 2022-09-02 | End: 2022-09-07

## 2022-09-02 RX ORDER — CALCIUM GLUCONATE 20 MG/ML
2 INJECTION, SOLUTION INTRAVENOUS
Status: ACTIVE | OUTPATIENT
Start: 2022-09-02 | End: 2022-09-08

## 2022-09-02 RX ORDER — METHOCARBAMOL 500 MG/1
500 TABLET, FILM COATED ORAL EVERY 6 HOURS PRN
Status: DISCONTINUED | OUTPATIENT
Start: 2022-09-02 | End: 2022-09-09 | Stop reason: HOSPADM

## 2022-09-02 RX ORDER — OXYCODONE HYDROCHLORIDE 5 MG/1
5 TABLET ORAL EVERY 4 HOURS PRN
Status: DISCONTINUED | OUTPATIENT
Start: 2022-09-02 | End: 2022-09-09 | Stop reason: HOSPADM

## 2022-09-02 RX ORDER — CEFAZOLIN SODIUM 2 G/100ML
2 INJECTION, SOLUTION INTRAVENOUS
Status: COMPLETED | OUTPATIENT
Start: 2022-09-02 | End: 2022-09-02

## 2022-09-02 RX ORDER — PROPOFOL 10 MG/ML
INJECTION, EMULSION INTRAVENOUS CONTINUOUS PRN
Status: DISCONTINUED | OUTPATIENT
Start: 2022-09-02 | End: 2022-09-02

## 2022-09-02 RX ORDER — OXYCODONE HYDROCHLORIDE 5 MG/1
10 TABLET ORAL EVERY 4 HOURS PRN
Status: DISCONTINUED | OUTPATIENT
Start: 2022-09-02 | End: 2022-09-09 | Stop reason: HOSPADM

## 2022-09-02 RX ORDER — HYDROMORPHONE HCL IN WATER/PF 6 MG/30 ML
0.2 PATIENT CONTROLLED ANALGESIA SYRINGE INTRAVENOUS
Status: DISCONTINUED | OUTPATIENT
Start: 2022-09-02 | End: 2022-09-05

## 2022-09-02 RX ORDER — NITROGLYCERIN 20 MG/100ML
10-200 INJECTION INTRAVENOUS CONTINUOUS PRN
Status: DISCONTINUED | OUTPATIENT
Start: 2022-09-02 | End: 2022-09-05

## 2022-09-02 RX ORDER — DEXTROSE MONOHYDRATE 25 G/50ML
25-50 INJECTION, SOLUTION INTRAVENOUS
Status: DISCONTINUED | OUTPATIENT
Start: 2022-09-02 | End: 2022-09-02

## 2022-09-02 RX ORDER — POTASSIUM CHLORIDE 29.8 MG/ML
20 INJECTION INTRAVENOUS
Status: COMPLETED | OUTPATIENT
Start: 2022-09-02 | End: 2022-09-02

## 2022-09-02 RX ORDER — EPINEPHRINE IN 0.9 % SOD CHLOR 5 MG/250ML
.01-.1 PLASTIC BAG, INJECTION (ML) INTRAVENOUS CONTINUOUS PRN
Status: DISCONTINUED | OUTPATIENT
Start: 2022-09-02 | End: 2022-09-05

## 2022-09-02 RX ORDER — DEXTROSE MONOHYDRATE 100 MG/ML
INJECTION, SOLUTION INTRAVENOUS CONTINUOUS PRN
Status: DISCONTINUED | OUTPATIENT
Start: 2022-09-02 | End: 2022-09-06

## 2022-09-02 RX ADMIN — PHENYLEPHRINE HYDROCHLORIDE 50 MCG: 10 INJECTION INTRAVENOUS at 08:24

## 2022-09-02 RX ADMIN — VECURONIUM BROMIDE 1 MG: 1 INJECTION, POWDER, LYOPHILIZED, FOR SOLUTION INTRAVENOUS at 12:39

## 2022-09-02 RX ADMIN — PROPOFOL 40 MG: 10 INJECTION, EMULSION INTRAVENOUS at 10:47

## 2022-09-02 RX ADMIN — CEFAZOLIN SODIUM 2 G: 2 INJECTION, SOLUTION INTRAVENOUS at 11:49

## 2022-09-02 RX ADMIN — PHENYLEPHRINE HYDROCHLORIDE 100 MCG: 10 INJECTION INTRAVENOUS at 10:45

## 2022-09-02 RX ADMIN — PHENYLEPHRINE HYDROCHLORIDE 50 MCG: 10 INJECTION INTRAVENOUS at 10:04

## 2022-09-02 RX ADMIN — ALBUMIN HUMAN: 0.05 INJECTION, SOLUTION INTRAVENOUS at 12:13

## 2022-09-02 RX ADMIN — FENTANYL CITRATE 100 MCG: 50 INJECTION, SOLUTION INTRAMUSCULAR; INTRAVENOUS at 07:42

## 2022-09-02 RX ADMIN — MIDAZOLAM HYDROCHLORIDE 1 MG: 1 INJECTION, SOLUTION INTRAMUSCULAR; INTRAVENOUS at 06:43

## 2022-09-02 RX ADMIN — FENTANYL CITRATE 50 MCG: 50 INJECTION, SOLUTION INTRAMUSCULAR; INTRAVENOUS at 10:17

## 2022-09-02 RX ADMIN — PHENYLEPHRINE HYDROCHLORIDE 50 MCG: 10 INJECTION INTRAVENOUS at 10:08

## 2022-09-02 RX ADMIN — FENTANYL CITRATE 50 MCG: 50 INJECTION, SOLUTION INTRAMUSCULAR; INTRAVENOUS at 08:48

## 2022-09-02 RX ADMIN — ALBUMIN HUMAN 12.5 G: 0.05 INJECTION, SOLUTION INTRAVENOUS at 16:24

## 2022-09-02 RX ADMIN — SODIUM CHLORIDE, POTASSIUM CHLORIDE, SODIUM LACTATE AND CALCIUM CHLORIDE: 600; 310; 30; 20 INJECTION, SOLUTION INTRAVENOUS at 16:16

## 2022-09-02 RX ADMIN — SODIUM CHLORIDE, POTASSIUM CHLORIDE, SODIUM LACTATE AND CALCIUM CHLORIDE: 600; 310; 30; 20 INJECTION, SOLUTION INTRAVENOUS at 07:39

## 2022-09-02 RX ADMIN — SENNOSIDES AND DOCUSATE SODIUM 1 TABLET: 50; 8.6 TABLET ORAL at 20:05

## 2022-09-02 RX ADMIN — FENTANYL CITRATE 50 MCG: 50 INJECTION, SOLUTION INTRAMUSCULAR; INTRAVENOUS at 08:39

## 2022-09-02 RX ADMIN — PHENYLEPHRINE HYDROCHLORIDE 100 MCG: 10 INJECTION INTRAVENOUS at 07:51

## 2022-09-02 RX ADMIN — FENTANYL CITRATE 50 MCG: 50 INJECTION, SOLUTION INTRAMUSCULAR; INTRAVENOUS at 09:48

## 2022-09-02 RX ADMIN — PHENYLEPHRINE HYDROCHLORIDE 100 MCG: 10 INJECTION INTRAVENOUS at 07:50

## 2022-09-02 RX ADMIN — OXYCODONE HYDROCHLORIDE 10 MG: 5 TABLET ORAL at 23:44

## 2022-09-02 RX ADMIN — MAGNESIUM SULFATE HEPTAHYDRATE 2 G: 40 INJECTION, SOLUTION INTRAVENOUS at 08:08

## 2022-09-02 RX ADMIN — HEPARIN SODIUM 32000 UNITS: 1000 INJECTION INTRAVENOUS; SUBCUTANEOUS at 10:04

## 2022-09-02 RX ADMIN — POTASSIUM CHLORIDE 20 MEQ: 29.8 INJECTION, SOLUTION INTRAVENOUS at 17:34

## 2022-09-02 RX ADMIN — METHOCARBAMOL 500 MG: 500 TABLET ORAL at 23:44

## 2022-09-02 RX ADMIN — MIDAZOLAM HYDROCHLORIDE 2 MG: 1 INJECTION, SOLUTION INTRAMUSCULAR; INTRAVENOUS at 12:39

## 2022-09-02 RX ADMIN — PHENYLEPHRINE HYDROCHLORIDE 50 MCG: 10 INJECTION INTRAVENOUS at 08:05

## 2022-09-02 RX ADMIN — PHENYLEPHRINE HYDROCHLORIDE 100 MCG: 10 INJECTION INTRAVENOUS at 13:27

## 2022-09-02 RX ADMIN — SODIUM CHLORIDE 1 UNITS/HR: 9 INJECTION, SOLUTION INTRAVENOUS at 14:49

## 2022-09-02 RX ADMIN — ROCURONIUM BROMIDE 40 MG: 50 INJECTION, SOLUTION INTRAVENOUS at 09:36

## 2022-09-02 RX ADMIN — CEFAZOLIN SODIUM 2 G: 2 INJECTION, SOLUTION INTRAVENOUS at 07:49

## 2022-09-02 RX ADMIN — PHENYLEPHRINE HYDROCHLORIDE 100 MCG: 10 INJECTION INTRAVENOUS at 13:23

## 2022-09-02 RX ADMIN — PHENYLEPHRINE HYDROCHLORIDE 25 MCG: 10 INJECTION INTRAVENOUS at 10:37

## 2022-09-02 RX ADMIN — EPINEPHRINE 0.02 MCG/KG/MIN: 1 INJECTION INTRAMUSCULAR; INTRAVENOUS; SUBCUTANEOUS at 12:09

## 2022-09-02 RX ADMIN — PROTAMINE SULFATE 130 MG: 10 INJECTION, SOLUTION INTRAVENOUS at 12:18

## 2022-09-02 RX ADMIN — MIDAZOLAM HYDROCHLORIDE 1 MG: 1 INJECTION, SOLUTION INTRAMUSCULAR; INTRAVENOUS at 12:06

## 2022-09-02 RX ADMIN — ROCURONIUM BROMIDE 60 MG: 50 INJECTION, SOLUTION INTRAVENOUS at 07:42

## 2022-09-02 RX ADMIN — FENTANYL CITRATE 50 MCG: 50 INJECTION, SOLUTION INTRAMUSCULAR; INTRAVENOUS at 13:51

## 2022-09-02 RX ADMIN — PROPOFOL 40 MG: 10 INJECTION, EMULSION INTRAVENOUS at 10:25

## 2022-09-02 RX ADMIN — ASPIRIN 81 MG CHEWABLE TABLET 162 MG: 81 TABLET CHEWABLE at 05:30

## 2022-09-02 RX ADMIN — PHENYLEPHRINE HYDROCHLORIDE 25 MCG: 10 INJECTION INTRAVENOUS at 10:38

## 2022-09-02 RX ADMIN — PROPOFOL 100 MG: 10 INJECTION, EMULSION INTRAVENOUS at 07:42

## 2022-09-02 RX ADMIN — DEXMEDETOMIDINE HYDROCHLORIDE 0.2 MCG/KG/HR: 400 INJECTION INTRAVENOUS at 15:26

## 2022-09-02 RX ADMIN — PHENYLEPHRINE HYDROCHLORIDE 50 MCG: 10 INJECTION INTRAVENOUS at 12:28

## 2022-09-02 RX ADMIN — PHENYLEPHRINE HYDROCHLORIDE 50 MCG: 10 INJECTION INTRAVENOUS at 10:01

## 2022-09-02 RX ADMIN — PROPOFOL 50 MG: 10 INJECTION, EMULSION INTRAVENOUS at 08:39

## 2022-09-02 RX ADMIN — ACETAMINOPHEN 975 MG: 325 TABLET ORAL at 22:08

## 2022-09-02 RX ADMIN — PROPOFOL 30 MG: 10 INJECTION, EMULSION INTRAVENOUS at 10:48

## 2022-09-02 RX ADMIN — CEFAZOLIN 1 G: 1 INJECTION, POWDER, FOR SOLUTION INTRAMUSCULAR; INTRAVENOUS at 16:52

## 2022-09-02 RX ADMIN — PHENYLEPHRINE HYDROCHLORIDE 50 MCG: 10 INJECTION INTRAVENOUS at 08:00

## 2022-09-02 RX ADMIN — PHENYLEPHRINE HYDROCHLORIDE 100 MCG: 10 INJECTION INTRAVENOUS at 10:24

## 2022-09-02 RX ADMIN — PHENYLEPHRINE HYDROCHLORIDE 25 MCG: 10 INJECTION INTRAVENOUS at 10:30

## 2022-09-02 RX ADMIN — MIDAZOLAM HYDROCHLORIDE 1 MG: 1 INJECTION, SOLUTION INTRAMUSCULAR; INTRAVENOUS at 07:52

## 2022-09-02 RX ADMIN — CHLORHEXIDINE GLUCONATE 10 ML: 1.2 SOLUTION ORAL at 05:58

## 2022-09-02 RX ADMIN — FENTANYL CITRATE 50 MCG: 50 INJECTION, SOLUTION INTRAMUSCULAR; INTRAVENOUS at 07:52

## 2022-09-02 RX ADMIN — FENTANYL CITRATE 50 MCG: 50 INJECTION, SOLUTION INTRAMUSCULAR; INTRAVENOUS at 06:48

## 2022-09-02 RX ADMIN — HYDROMORPHONE HYDROCHLORIDE 0.2 MG: 0.2 INJECTION, SOLUTION INTRAMUSCULAR; INTRAVENOUS; SUBCUTANEOUS at 15:15

## 2022-09-02 RX ADMIN — SODIUM CHLORIDE: 9 INJECTION, SOLUTION INTRAVENOUS at 07:39

## 2022-09-02 RX ADMIN — PHENYLEPHRINE HYDROCHLORIDE 100 MCG: 10 INJECTION INTRAVENOUS at 10:41

## 2022-09-02 RX ADMIN — SODIUM CHLORIDE: 9 INJECTION, SOLUTION INTRAVENOUS at 10:33

## 2022-09-02 RX ADMIN — METOPROLOL TARTRATE 25 MG: 25 TABLET, FILM COATED ORAL at 05:30

## 2022-09-02 RX ADMIN — PROPOFOL 75 MCG/KG/MIN: 10 INJECTION, EMULSION INTRAVENOUS at 12:21

## 2022-09-02 RX ADMIN — MUPIROCIN 0.5 G: 20 OINTMENT TOPICAL at 22:08

## 2022-09-02 RX ADMIN — FENTANYL CITRATE 100 MCG: 50 INJECTION, SOLUTION INTRAMUSCULAR; INTRAVENOUS at 13:18

## 2022-09-02 RX ADMIN — NITROGLYCERIN 20 MCG: 10 INJECTION INTRAVENOUS at 10:17

## 2022-09-02 RX ADMIN — NEOSTIGMINE METHYLSULFATE 3 MG: 1 INJECTION, SOLUTION INTRAVENOUS at 13:38

## 2022-09-02 RX ADMIN — PHENYLEPHRINE HYDROCHLORIDE 100 MCG: 10 INJECTION INTRAVENOUS at 12:35

## 2022-09-02 RX ADMIN — PHENYLEPHRINE HYDROCHLORIDE 25 MCG: 10 INJECTION INTRAVENOUS at 10:32

## 2022-09-02 RX ADMIN — VECURONIUM BROMIDE 3 MG: 1 INJECTION, POWDER, LYOPHILIZED, FOR SOLUTION INTRAVENOUS at 10:41

## 2022-09-02 RX ADMIN — MIDAZOLAM HYDROCHLORIDE 1 MG: 1 INJECTION, SOLUTION INTRAMUSCULAR; INTRAVENOUS at 08:39

## 2022-09-02 RX ADMIN — PHENYLEPHRINE HYDROCHLORIDE 50 MCG: 10 INJECTION INTRAVENOUS at 10:34

## 2022-09-02 RX ADMIN — NITROGLYCERIN 10 MCG/KG/MIN: 20 INJECTION INTRAVENOUS at 10:20

## 2022-09-02 RX ADMIN — AMINOCAPROIC ACID 5 G/HR: 250 INJECTION, SOLUTION INTRAVENOUS at 07:49

## 2022-09-02 RX ADMIN — HYDRALAZINE HYDROCHLORIDE 10 MG: 20 INJECTION, SOLUTION INTRAMUSCULAR; INTRAVENOUS at 15:49

## 2022-09-02 RX ADMIN — PHENYLEPHRINE HYDROCHLORIDE 0.1 MCG/KG/MIN: 10 INJECTION INTRAVENOUS at 08:15

## 2022-09-02 RX ADMIN — PHENYLEPHRINE HYDROCHLORIDE 0.2 MCG/KG/MIN: 10 INJECTION INTRAVENOUS at 07:30

## 2022-09-02 RX ADMIN — FENTANYL CITRATE 50 MCG: 50 INJECTION, SOLUTION INTRAMUSCULAR; INTRAVENOUS at 12:24

## 2022-09-02 RX ADMIN — FAMOTIDINE 20 MG: 20 TABLET ORAL at 05:30

## 2022-09-02 RX ADMIN — ALBUMIN HUMAN 12.5 G: 0.05 INJECTION, SOLUTION INTRAVENOUS at 14:18

## 2022-09-02 RX ADMIN — PROPOFOL 50 MG: 10 INJECTION, EMULSION INTRAVENOUS at 12:19

## 2022-09-02 RX ADMIN — SODIUM CHLORIDE: 9 INJECTION, SOLUTION INTRAVENOUS at 15:00

## 2022-09-02 RX ADMIN — POTASSIUM CHLORIDE 20 MEQ: 29.8 INJECTION, SOLUTION INTRAVENOUS at 16:03

## 2022-09-02 RX ADMIN — HYDROMORPHONE HYDROCHLORIDE 0.2 MG: 0.2 INJECTION, SOLUTION INTRAMUSCULAR; INTRAVENOUS; SUBCUTANEOUS at 18:58

## 2022-09-02 RX ADMIN — NITROGLYCERIN 10 MCG: 10 INJECTION INTRAVENOUS at 10:16

## 2022-09-02 RX ADMIN — PROPOFOL 50 MG: 10 INJECTION, EMULSION INTRAVENOUS at 10:20

## 2022-09-02 RX ADMIN — SODIUM CHLORIDE, POTASSIUM CHLORIDE, SODIUM LACTATE AND CALCIUM CHLORIDE: 600; 310; 30; 20 INJECTION, SOLUTION INTRAVENOUS at 06:04

## 2022-09-02 RX ADMIN — GLYCOPYRROLATE 0.4 MG: 0.2 INJECTION, SOLUTION INTRAMUSCULAR; INTRAVENOUS at 13:38

## 2022-09-02 RX ADMIN — OXYCODONE HYDROCHLORIDE 10 MG: 5 TABLET ORAL at 20:05

## 2022-09-02 RX ADMIN — PROPOFOL 60 MG: 10 INJECTION, EMULSION INTRAVENOUS at 10:18

## 2022-09-02 RX ADMIN — METHOCARBAMOL 500 MG: 500 TABLET ORAL at 18:19

## 2022-09-02 RX ADMIN — NITROGLYCERIN 10 MCG: 10 INJECTION INTRAVENOUS at 08:50

## 2022-09-02 RX ADMIN — PHENYLEPHRINE HYDROCHLORIDE 100 MCG: 10 INJECTION INTRAVENOUS at 08:14

## 2022-09-02 RX ADMIN — PHENYLEPHRINE HYDROCHLORIDE 50 MCG: 10 INJECTION INTRAVENOUS at 12:13

## 2022-09-02 RX ADMIN — PHENYLEPHRINE HYDROCHLORIDE 100 MCG: 10 INJECTION INTRAVENOUS at 12:31

## 2022-09-02 RX ADMIN — LIDOCAINE HYDROCHLORIDE 80 MG: 20 INJECTION, SOLUTION INFILTRATION; PERINEURAL at 07:42

## 2022-09-02 ASSESSMENT — ACTIVITIES OF DAILY LIVING (ADL)
ADLS_ACUITY_SCORE: 18
ADLS_ACUITY_SCORE: 24
ADLS_ACUITY_SCORE: 18
ADLS_ACUITY_SCORE: 18
ADLS_ACUITY_SCORE: 24
ADLS_ACUITY_SCORE: 18
ADLS_ACUITY_SCORE: 18

## 2022-09-02 ASSESSMENT — LIFESTYLE VARIABLES: TOBACCO_USE: 0

## 2022-09-02 ASSESSMENT — ENCOUNTER SYMPTOMS
SEIZURES: 0
DYSRHYTHMIAS: 0

## 2022-09-02 NOTE — ANESTHESIA PROCEDURE NOTES
Arterial Line Procedure Note    Pre-Procedure   Staff -        Anesthesiologist:  Joyce Recinos MD       Performed By: anesthesiologist       Location: pre-op       Pre-Anesthestic Checklist: patient identified, IV checked, site marked, risks and benefits discussed, informed consent, monitors and equipment checked, pre-op evaluation and at physician/surgeon's request  Timeout:       Correct Patient: Yes        Correct Procedure: Yes        Correct Site: Yes        Correct Position: Yes   Line Placement:   This line was placed Pre Induction starting at 9/2/2022 6:30 AM and ending at 9/2/2022 6:35 AM  Procedure   Procedure: arterial line       Laterality: right       Insertion Site: radial.  Sterile Prep        All elements of maximal sterile barrier technique followed       Patient Prep/Sterile Barriers: hand hygiene, sterile gloves, hat, mask, draped, sterile gown, draped       Skin prep: Chloraprep  Insertion/Injection        Technique: Seldinger Technique and ultrasound guided (no image saved)        1. Ultrasound was used to evaluate the access site.       2. Artery evaluated via ultrasound for patency/adequacy.       3. Using real-time ultrasound the needle/catheter was observed entering the artery/vein.       5. The visualized structures were anatomically normal.       6. There were no apparent abnormal pathologic findings.       Catheter Type/Size: 20 gauge, 1.75 in/4.5 cm quick cath (integral wire)  Narrative        Tegaderm dressing used.       Complications: None apparent,        Arterial waveform: Yes        IBP within 10% of NIBP: Yes

## 2022-09-02 NOTE — ANESTHESIA CARE TRANSFER NOTE
Patient: Kalani Rowan    Procedure: Procedure(s):  CORONARY ARTERY BYPASS GRAFT x 3 (LEFT INTERNAL MAMMARY ARTERY - LEFT ANTERIOR DESCENDING ARTERY; SAPHENOUS VEIN - OBTUSE MARGINAL ARTERY; SAPHENOUS VEIN - POSTERIOR DESCENDING ARTERY) WITH ENDOSCOPIC SAPHENOUS VEIN HARVEST ON BILATERAL LOWER EXTREMITY, AND ON CARDIOPULMONARY PUMP OXYGENATOR (INTRAOPERATIVE TRANSESOPHAGEAL ECHOCARDIOGRAM BY ANESTHESIOLOGIST)       Diagnosis: NSTEMI (non-ST elevated myocardial infarction) (H) [I21.4]  Diagnosis Additional Information: No value filed.    Anesthesia Type:   General     Note:    Oropharynx: endotracheal tube in place and ventilatory support  Level of Consciousness: iatrogenic sedation  Patient oxygen source: BMV.  Level of Supplemental Oxygen (L/min / FiO2): 15L  Independent Airway: airway patency satisfactory and stable  Dentition: dentition unchanged  Vital Signs Stable: post-procedure vital signs reviewed and stable  Report to RN Given: handoff report given  Patient transferred to: ICU  Comments: Patient connected to SpO2, EKG, and arterial blood pressure transport monitors and accompanied by CRNA, anesthesiologist, circulating RN to ICU room. Patient ventilated by CRNA with ambu via ETT with O2 at 15 liters per minute during transport.     On arrival to ICU, endotracheal tube position unchanged, equal, bilateral breath sounds auscultated in ICU room, patient placed on ICU ventilator by respiratory therapist, teeth and oral mucosa intact and unchanged at handoff of care. At anesthesia handoff of care, clinical monitors and alarms on and functioning, report on patient's clinical status given to ICU RN, report on patient's clinical status given to intensivist, ICU staff questions answered.  ICU Handoff: Call for PAUSE to initiate/utilize ICU HANDOFF, Identified Patient, Identified Responsible Provider, Reviewed the Pertinent Medical History, Discussed Surgical Course, Reviewed Intra-OP Anesthesia Management and  Issues during Anesthesia, Set Expectations for Post Procedure Period and Allowed Opportunity for Questions and Acknowledgement of Understanding      Vitals:  Vitals Value Taken Time   BP     Temp     Pulse 69 09/02/22 1339   Resp 10 09/02/22 1339   SpO2 99 % 09/02/22 1339   Vitals shown include unvalidated device data.    Electronically Signed By: CARL Tang CRNA  September 2, 2022  1:40 PM

## 2022-09-02 NOTE — PROGRESS NOTES
Extubation Note    Successful completion of SBT (Yes or No):Yes  Extubation time:1757    Patient assessment:  Lung sounds:Clear and diminished  Stridor Present (Yes or No):No  Patient tolerance:Good    Oxygen device:  4L Oxymask  SpO2:97%    Plan:Will cont to monitor.  9/2/2022  Pinky Vigil, RT

## 2022-09-02 NOTE — ANESTHESIA POSTPROCEDURE EVALUATION
Patient: Kalani Rowan    Procedure: Procedure(s):  CORONARY ARTERY BYPASS GRAFT x 3 (LEFT INTERNAL MAMMARY ARTERY - LEFT ANTERIOR DESCENDING ARTERY; SAPHENOUS VEIN - OBTUSE MARGINAL ARTERY; SAPHENOUS VEIN - POSTERIOR DESCENDING ARTERY) WITH ENDOSCOPIC SAPHENOUS VEIN HARVEST ON BILATERAL LOWER EXTREMITY, AND ON CARDIOPULMONARY PUMP OXYGENATOR (INTRAOPERATIVE TRANSESOPHAGEAL ECHOCARDIOGRAM BY ANESTHESIOLOGIST)       Anesthesia Type:  General    Note:  Disposition: ICU            ICU Sign Out: Anesthesiologist/ICU physician sign out WAS performed   Postop Pain Control: Uneventful            Sign Out: Well controlled pain   PONV: No   Neuro/Psych: Uneventful            Sign Out: Acceptable/Baseline neuro status   Airway/Respiratory: Uneventful            Sign Out: AIRWAY IN SITU/Resp. Support               Airway in situ/Resp. Support: ETT                 Reason: Planned Pre-op   CV/Hemodynamics: Uneventful            Sign Out: Detailed CV status               Blood Pressure: HypOtension               Rate/Rhythm: Normal HR               Perfusion:  Inotropes   Other NRE: NONE   DID A NON-ROUTINE EVENT OCCUR? No    Event details/Postop Comments:  To ICU on 15L/min O2, ventilation adequate via ambu. Propofol for sedation, minimal vasopressor requirements with phenylephrine. All vital signs stable. Report given to ICU provider and nursing staff. No complications.              Last vitals:  Vitals:    09/02/22 1656 09/02/22 1700 09/02/22 1715   BP:      Pulse:  87 87   Resp:  28 25   Temp:  37.9  C (100.2  F) 38  C (100.4  F)   SpO2: 98% 98% 98%       Electronically Signed By: Joyce Recinos MD  September 2, 2022  5:41 PM

## 2022-09-02 NOTE — ANESTHESIA PROCEDURE NOTES
Airway       Patient location during procedure: OR  Staff -        Anesthesiologist:  Joyce Recinos MD       CRNA: Sameera Crespo APRN CRNA       Performed By: CRNA  Consent for Airway        Urgency: elective  Indications and Patient Condition       Indications for airway management: vandana-procedural       Induction type:intravenous       Mask difficulty assessment: 2 - vent by mask + OA or adjuvant +/- NMBA    Final Airway Details       Final airway type: endotracheal airway       Successful airway: ETT - single  Endotracheal Airway Details        ETT size (mm): 7.0       Cuffed: yes       Successful intubation technique: video laryngoscopy       VL Blade Size: Carlin 3       Grade View of Cords: 1       Adjucts: stylet       Position: Right       Measured from: lips       Secured at (cm): 21       Bite block used: None    Post intubation assessment        Placement verified by: capnometry, equal breath sounds and chest rise        Number of attempts at approach: 1       Secured with: pink tape       Ease of procedure: easy       Dentition: Unchanged

## 2022-09-02 NOTE — OP NOTE
OPERATIVE DATE: 9/2/2022    PRE-OPERATIVE DIAGNOSIS:  1) Severe multi-vessel coronary artery disease  2) Obesity  3) Hyperlipidemia  4) Hypertension  5) COVID pneumonia    POST-OPERATIVE DIAGNOSIS:  1) Severe multi-vessel coronary artery disease  2) Obesity  3) Hyperlipidemia  4) Hypertension  5) COVID pneumonia    PROCEDURE:  1) Coronary artery bypass grafting x 3   - Left internal mammary artery to left anterior descending artery   - Reversed saphenous vein graft to right posterior descending artery   - Reversed saphenous vein graft to obtuse marginal artery   2) Endoscopic vein harvest left and right lower extremity  3) Transesophageal echocardiogram    SURGEON: Berto Tsai MD    ASSISTANT: Shea Flower, PAC    ANESTHESIA: GETA    ESTIMATED BLOOD LOSS: 500cc    OPERATIVE FINDINGS:  1) Ejection fraction: 50%  2) Left internal mammary artery 2.5mm and excellent flow.  3) Left and right greater saphenous vein 3.5mm and suitable for bypass.  4) Left anterior descending artery 1.75mm and free of disease at anastomosis.  5) Right posterior descending artery 1.5mm and free of disease at anastomosis.  6) Obtuse marginal artery 1.75mm and free of disease at anastomosis.  7) Diagonal artery too small to graft.    CARDIOPULMONARY BYPASS TIME: 83 minutes    AORTIC CROSS CLAMP TIME: 70 minutes    INDICATIONS:  KATERIN LUNA is a 71 year old female admitted with shortness of breath with recent COVID pneumonia. She was found to have multi vessel coronary artery disease. We were asked to evaluate for surgical bypass. Risks and benefits of the operation were explained to the patient and their family including, but not limited to, bleeding, infection, stroke and even death. They understood these risks and agreed to proceed electively.    OPERATIVE REPORT:  The patient was transferred to the operating room and positioned supine on the OR table. General anesthesia was initiated by the anesthesia team. Endotracheal  intubation and central venous access was performed by anesthesia. The patient's neck, chest, abdomen and bilateral lower extremities were clipped, prepped and draped in sterile fashion. A pre-procedure time-out was performed confirming the correct patient, correct site and correct procedure.    Simultaneous median sternotomy and left/right lower extremity skin incisions were made. Endoscopic vein harvest of the left and right greater saphenous vein was performed. The vein was ligated at the proximal and distal ends, harvested from the leg, cannulated in reverse orientation, and flushed with heparinized saline. Branches of the vein were doubly clipped with metal clips. The vein was then stored in heparinized saline.    Median sternotomy was made with reciprocating saw. The bone was made hemostatic with cautery and bone wax. A mammary retractor was placed. The left pleural space was opened.  The left internal mammary artery was mobilized. Branches of the artery were clipped with metal clips and divided with cautery.      The patient was given 300 mg/kg IV heparin. The mammary pedicle was clamped with a tonsil clamp. The mammary artery was divided with metzenbaum scissors. There was excellent pulsatile flow from the mammary artery. This was controlled with a bulldog clamp. The distal aspect was tied with 0-silk tie and double clipped. The proximal end was spatulated in preparation for anastomosis and flushed with papavarine.      Pledgeted 3-0 ethibond pursestring was made in the ascending aorta. A 18F EOPA arterial cannula was placed here and connected to the bypass circuit. A 2-0 ethibond pursestring was made in the right atrial appendage. A dual stage venous cannula was placed here and connected to the cardiopulmonary bypass circuit. A 4-0 prolene pursestring was made in the right atrium. A stab incision was made here. A retrograde cardioplegia catheter was placed and connected to the cardioplegia circuit.  Next the  aorto-pulmonary window was developed.  A 4-0 prolene pursestring was made in the ascending aorta. A DLP root vent / antegrade cardioplegia catheter was placed here and connected to the cardioplegia circuit.    Cardiopulmonary bypass was initiated with good flows. Flow on the circuit was decreased. A large aortic cross clamp was applied. Flow on the circuit was resumed. 1000cc of antegrade cold blood cardioplegia was delivered with good diastolic arrest. An additional 300cc of retrograde cold blood cardioplegia was used to test the retrograde.    We focused our attention on the distal bypass anastomoses next.    The following bypasses were constructed using reversed saphenous vein in end-to-side fashion with running 7-0 prolene:  1) Reversed saphenous vein graft to posterior descending artery.  2) Reversed saphenous vein graft to obtuse marginal artery.  The anastomoses were tested by flushing with cold blood cardioplegia to ensure hemostasis. An additional dose of retrograde cold blood cardioplegia was delivered between completion of each anastomosis.    We focused our attention on the proximal vein graft anastomoses next. The vein grafts were sized to fit to the ascending aorta. 32 additional aortotomies were made with 11-blade knife. The aortotomies were extended with 4mm punch. The vein grafts were anastomosed to the ascending aorta in end-to-side fashion using running 6-0 prolene.    Next a rent was made in the left pericardium. The left internal mammary artery was then anastomosed to the left anterior descending artery in end-to-side fashion using running 7-0 prolene.  The mammary pedicle clamp was removed. The anastomosis was hemostatic. The clamp was replaced. The pedicle was tacked to the epicardium using 6-0 prolene.    A retrograde hot shot of warm blood cardiplegia was delivered. The clamp on the mammary pedicle was removed. A bulldog clamp was placed on the proximal vein grafts. After completion of the  hot shot, flow on the bypass circuit was decreased and the aortic cross clamp was removed. Flow on the bypass circuit was resumed. The proximal vein grafts were de-aired using an insulin needle. The bulldog clamp on the vein grafts was removed. The retrograde cardioplegia catheter was removed. The pursestring was tied. The site was over-sewn with a 4-0 prolene.  The distal anastomoses were inspected and hemostatic. Ventricular pacing wires were placed and delivered through the anterior abdominal wall and secured to the skin with 0-ethibond stitches. The patient had sinus rhythm and was paced at backup 50 bpm.      The left pleural space was suctioned dry. The lungs were ventilated bilaterally. GERSON showed no intra-cardiac air. The DLP root vent / antegrade cardioplegia catheter was removed. Intravenous calcium was administered.  Flow on the bypass circuit was weaned to off. The patient was stable on low dose epinephrine and neosynephrine. The venous cannula was removed.  Pursestring at this site was tied. This was over-sewed with 4-0 prolene. The remaining pump blood volume was returned via the arterial cannula. A test dose of protamine was administered. The arterial cannula was removed. The pursestrings at this site were tied. This was oversewn with pledgeted 4-0 prolene.     The sternal retractor was removed. Two 32F straight argyle mediastinal chest tubes were placed and one 28F left pleural chest tube was placed. These were delivered through the anterior abdominal wall and secured to the skin with 0-ethibond stitches.      The wound was made hemostatic with cautery. The subcutaneous tissue, sternal edges, thymic fat, pericardial edges and all anastomotic and cannulation sites were inspected and hemostatic.    The wound was irrigated with warm antibiotic saline. The sternum was reapproximated with sternal wires.    The wound was made hemostatic then closed in layers using Stratafix sutures. The subcutaneous tissue  was closed with 2-0 Stratafix stitches. The skin was closed with 4-0 Stratafix.  Dermabond skin glue was applied. A sterile dressing was applied.      The patient was then transferred from the operating bed to an ICU bed and transferred to the ICU in critical, but stable, condition.    All needle, sponge and instrument counts were correct at the end of the case.    Berto Tsai MD  Cardiothoracic Surgery  Pager: [phone]

## 2022-09-02 NOTE — PROGRESS NOTES
RT bedside to provide initial instruct with Incentive Spirometer. Patient achieved 1250ml Pre-op, putting forth good effort.    Manuelito Stanley, RT

## 2022-09-02 NOTE — PLAN OF CARE
[id number]  Patient alert and oriented times four.  Assist of SBA   Denies pain and SOB  Left radial site - WDL CMS intact slightly bruised  Tele: SR   Patient sent down to preop@ 1378 with . All belongings sent .  Plan for CABG

## 2022-09-02 NOTE — CONSULTS
CARDIAC SURGERY NUTRITION CONSULT    Received standing order to assess and educate patient.    Will follow and complete assessment once patient is extubated and/or is transferred to medical unit.    Patient will receive nutrition education during the Outpatient Cardiac Rehab Program (nutrition classes/dietitian counseling).    Ida Lindsey, ROLAN, LD, [facility]

## 2022-09-02 NOTE — PROGRESS NOTES
[facility]  Critical Care Service  Progress Note  Date of Service (when I saw the patient): 09/02/2022  Main Plans for Today Extubate on pathway if possible  Assessment & Plan   Kalani Rowan is a 71 year old female who was admitted on 8/31/2022. She underwent 3 vessel CAB today without issue.  Neuro  1. Acute pain  2. Sedation  Plan:  -- Scheduled acetaminophen  -- Propofol for sedation as needed until extubation  -- Delirium prevention as able    CV  1. S/p 3 vessel CAB  2. HTN  Plan:  -- Per pathway  -- Cardiac meds per CV surgery    Resp:  1. Post operative ventilator management  2. Acute respiratory failure  Plan:  -- Current settings are:    Vent Mode: CMV/AC  (Continuous Mandatory Ventilation/ Assist Control)  FiO2 (%): 40 %  Resp Rate (Set): 16 breaths/min  Tidal Volume (Set, mL): 500 mL  PEEP (cm H2O): 5 cmH2O  Resp: 20      -- PST when hemodynamically stable    GI/Nutrition  1. No prior hx  Plan:  -- NPO  -- PPI    Renal  1. No prior hx.  Baseline creatinine appears to be 0.7  Plan:  --monitor function and electrolytes as needed with replacement per ICU protocols. - generally avoid nephrotoxic agents such as NSAID, IV contrast unless specifically required  -- adjust medications as needed for renal clearance  -- follow I/O's as appropriate.  -- maintain euvolemia      Endocrine  1. Stress Hyperglycemia  Plan:  --  Insulin gtt per protocol if indicated  -- Keep BG  <180 for optimal healing    Heme:  1. Acute blood loss anemia  2. Coagulopathy   Plan:  -- Monitor hemoglobin.  -- Transfuse to keep > 7.5      General cares:  DVT Prophylaxis: Pneumatic Compression Devices  GI Prophylaxis: PPI  Restraints: Restraints for medical healing needed: YES  Family update by me today: No  Current lines are required for patient management  Access:  Jewel Squires MD  Time Spent on this Encounter   Billing:  I spent 35 minutes bedside and on the inpatient unit today managing the critical  care of Kalani Rowan in relation to the issues listed in this note.  Interval History   S/p 3 vessel CAB  Physical Exam   Temp: 98.6  F (37  C) Temp src: Temporal Temp  Min: 98.3  F (36.8  C)  Max: 98.6  F (37  C) BP: 137/72 Pulse: 56   Resp: 20 SpO2: 98 % O2 Device: Mechanical Ventilator Oxygen Delivery: 2 LPM  Vitals:    09/01/22 0417   Weight: 58.2 kg (128 lb 6.4 oz)     I/O last 3 completed shifts:  In: 3150 [I.V.:2700; Other:200]  Out: 1825 [Urine:1825]    GEN: sedated on ventilator  EYES: PERRL, Anicteric sclera.   HEENT:  Normocephalic, atraumatic, trachea midline, ETT secure  CV: RRR, no gallops, rubs, or murmurs  PULM/CHEST: Clear breath sounds bilaterally without rhonchi, crackles or wheeze, symmetric chest rise  GI: soft, non-distenede  : truong catheter in place, urine yellow and clear  EXTREMITIES: no peripheral edema, moving all extremities, peripheral pulses intact  NEURO: sedated  SKIN: No rashes, sores or ulcerations      Data   Recent Labs   Lab 09/02/22  1613 09/02/22  1441 09/02/22  1429 09/02/22  1226 09/02/22  0350 08/31/22  2102   WBC  --   --  17.2* 22.3* 10.0 13.4*   HGB  --   --  10.7* 7.9* 13.8 13.7   MCV  --   --  90 91 91 89   PLT  --   --  191 162 288 336   INR  --   --  1.26* 1.55*  --   --    NA  --   --  143 144 140 132*   POTASSIUM  --   --  3.3* 3.9 3.8 3.9   CHLORIDE  --   --  114* 115* 110* 99   CO2  --   --  23 24 26 23   BUN  --   --  13 14 20 22   CR  --   --  0.59 0.51* 0.69 0.58   ANIONGAP  --   --  6 5 4 10   APRIL  --   --  8.1* 8.2* 9.0 9.0   * 159* 154* 172* 103* 163*   ALBUMIN  --   --  2.6*  --   --  3.1*   PROTTOTAL  --   --  4.6*  --   --  6.5*   BILITOTAL  --   --  0.6  --   --  0.2   ALKPHOS  --   --  48  --   --  92   ALT  --   --  36  --   --  36   AST  --   --  68*  --   --  13

## 2022-09-02 NOTE — BRIEF OP NOTE
[facility]    Brief Operative Note    Pre-operative diagnosis: NSTEMI (non-ST elevated myocardial infarction) (H) [I21.4]  Post-operative diagnosis Same as pre-operative diagnosis    Procedure: Procedure(s):  CORONARY ARTERY BYPASS GRAFT x 3 (LEFT INTERNAL MAMMARY ARTERY - LEFT ANTERIOR DESCENDING ARTERY; SAPHENOUS VEIN - OBTUSE MARGINAL ARTERY; SAPHENOUS VEIN - POSTERIOR DESCENDING ARTERY) WITH ENDOSCOPIC SAPHENOUS VEIN HARVEST ON BILATERAL LOWER EXTREMITY, AND ON CARDIOPULMONARY PUMP OXYGENATOR (INTRAOPERATIVE TRANSESOPHAGEAL ECHOCARDIOGRAM BY ANESTHESIOLOGIST)  Surgeon: Surgeon(s) and Role:     * Berto Tsai MD - Primary     * Shea Flower PA-C - Assisting  Anesthesia: General   Estimated Blood Loss: 1000 mL    Drains:2 x mediastinal, 1 x left pleural  Specimens:   ID Type Source Tests Collected by Time Destination   A : STAT LABS (POST CARDIOPULMONARY BYPASS) Blood Line, arterial CBC WITH PLATELETS, BASIC METABOLIC PANEL, FIBRINOGEN ACTIVITY, PARTIAL THROMBOPLASTIN TIME, INR Sameera Crespo APRN CRNA 9/2/2022 12:26 PM      Findings:   None.  Complications: None.  Implants: * No implants in log *    Shea Flower PA-C on 9/2/2022 at 1:25 PM

## 2022-09-02 NOTE — ANESTHESIA PROCEDURE NOTES
Central Line/PA Catheter Placement    Pre-Procedure   Staff -        Anesthesiologist:  Joyce Recinos MD       Performed By: anesthesiologist       Location: pre-op       Pre-Anesthestic Checklist: patient identified, IV checked, site marked, risks and benefits discussed, informed consent, monitors and equipment checked, pre-op evaluation and at physician/surgeon's request  Timeout:       Correct Patient: Yes        Correct Procedure: Yes        Correct Site: Yes        Correct Position: Yes        Correct Laterality: Yes   Line Placement:   This line was placed Pre Induction starting at 9/2/2022 6:40 AM and ending at 9/2/2022 6:55 AM    Procedure   Procedure: central line       Laterality: right       Insertion Site: internal jugular.       Patient Position: Trendelenburg  Sterile Prep        All elements of maximal sterile barrier technique followed       Patient Prep/Sterile Barriers: draped, hand hygiene, gloves , hat , mask , draped, gown, sterile gel and probe cover       Skin prep: Chloraprep  Insertion/Injection        Local skin infiltrated with 1 mL of 1% lidocaine.        Technique: ultrasound guided        1. Ultrasound was used to evaluate the access site.       2. Vein evaluated via ultrasound for patency/adequacy.       3. Using real-time ultrasound the needle/catheter was observed entering the artery/vein.       4. Permanent image was captured and entered into the patient's record.       5. The visualized structures were anatomically normal.       6. There were no apparent abnormal pathologic findings.       Introducer Type: 9 Fr, 10 cm        Type: PA/CVC with Introducer  Narrative         Secured by: suture       Tegaderm and Biopatch dressing used.       blood aspirated from all lumens,        All lumens flushed: Yes   Comments:  A time out was preformed identifying the correct procedure, the correct location with the nursing staff.  The right neck was prepped with 2% chlorhexidine and draped  with a full length sterile sheet in the usual fashion.  1% lidocaine was administered subcutaneously for local anesthesia.  The right internal jugular vein was accessed under ultrasound guidance with an 18 gauge thin wall needle, a 1.75 inch catheter was advanced over the needle and the needle was removed. Normal venous pressure was confirmed using a fluid column technique. A wire was then advanced through the catheter and catheter was then removed. A 9 [place] cordis was advanced over the wire via the seldinger technique. Blood was withdrawn from all lumens and flushed with normal saline.   Attention was then turned to placement of a triple lumen [place] catheter. All ports were flushed and balloon inflation confirmed prior to insertion into the cordis port. The catheter was introduced and advanced to 20 cm. The balloon was then inflated and the catheter was advanced through the right ventricle and into the pulmonary artery until a wedge position pressure tracing was obtained. The balloon was then deflated and verification of return of pulmonary artery pressure tracing was made. The cathter was locked to the Cordis with tip inserted to a distance of 50 cm.  The catheter was then sutured in place and a sterile dressing was applied over the site prior to removal of drapes.

## 2022-09-03 ENCOUNTER — APPOINTMENT (OUTPATIENT)
Dept: PHYSICAL THERAPY | Facility: CLINIC | Age: 72
DRG: 234 | End: 2022-09-03
Payer: COMMERCIAL

## 2022-09-03 ENCOUNTER — APPOINTMENT (OUTPATIENT)
Dept: GENERAL RADIOLOGY | Facility: CLINIC | Age: 72
DRG: 234 | End: 2022-09-03
Attending: PHYSICIAN ASSISTANT
Payer: COMMERCIAL

## 2022-09-03 LAB
ANION GAP SERPL CALCULATED.3IONS-SCNC: 3 MMOL/L (ref 3–14)
BUN SERPL-MCNC: 13 MG/DL (ref 7–30)
CA-I BLD-MCNC: 4.9 MG/DL (ref 4.4–5.2)
CALCIUM SERPL-MCNC: 8.5 MG/DL (ref 8.5–10.1)
CHLORIDE BLD-SCNC: 112 MMOL/L (ref 94–109)
CO2 SERPL-SCNC: 26 MMOL/L (ref 20–32)
CREAT SERPL-MCNC: 0.53 MG/DL (ref 0.52–1.04)
ERYTHROCYTE [DISTWIDTH] IN BLOOD BY AUTOMATED COUNT: 13.3 % (ref 10–15)
GFR SERPL CREATININE-BSD FRML MDRD: >90 ML/MIN/1.73M2
GLUCOSE BLD-MCNC: 104 MG/DL (ref 70–99)
GLUCOSE BLDC GLUCOMTR-MCNC: 105 MG/DL (ref 70–99)
GLUCOSE BLDC GLUCOMTR-MCNC: 114 MG/DL (ref 70–99)
GLUCOSE BLDC GLUCOMTR-MCNC: 114 MG/DL (ref 70–99)
GLUCOSE BLDC GLUCOMTR-MCNC: 120 MG/DL (ref 70–99)
GLUCOSE BLDC GLUCOMTR-MCNC: 121 MG/DL (ref 70–99)
GLUCOSE BLDC GLUCOMTR-MCNC: 123 MG/DL (ref 70–99)
GLUCOSE BLDC GLUCOMTR-MCNC: 149 MG/DL (ref 70–99)
GLUCOSE BLDC GLUCOMTR-MCNC: 166 MG/DL (ref 70–99)
HCT VFR BLD AUTO: 29.5 % (ref 35–47)
HGB BLD-MCNC: 9.6 G/DL (ref 11.7–15.7)
MAGNESIUM SERPL-MCNC: 2.3 MG/DL (ref 1.6–2.3)
MCH RBC QN AUTO: 29.8 PG (ref 26.5–33)
MCHC RBC AUTO-ENTMCNC: 32.5 G/DL (ref 31.5–36.5)
MCV RBC AUTO: 92 FL (ref 78–100)
PHOSPHATE SERPL-MCNC: 3.9 MG/DL (ref 2.5–4.5)
PLATELET # BLD AUTO: 188 10E3/UL (ref 150–450)
POTASSIUM BLD-SCNC: 3.8 MMOL/L (ref 3.4–5.3)
POTASSIUM BLD-SCNC: 3.8 MMOL/L (ref 3.4–5.3)
POTASSIUM BLD-SCNC: 4.2 MMOL/L (ref 3.4–5.3)
RBC # BLD AUTO: 3.22 10E6/UL (ref 3.8–5.2)
SODIUM SERPL-SCNC: 141 MMOL/L (ref 133–144)
WBC # BLD AUTO: 16.7 10E3/UL (ref 4–11)

## 2022-09-03 PROCEDURE — 85027 COMPLETE CBC AUTOMATED: CPT | Performed by: PHYSICIAN ASSISTANT

## 2022-09-03 PROCEDURE — 83735 ASSAY OF MAGNESIUM: CPT | Performed by: PHYSICIAN ASSISTANT

## 2022-09-03 PROCEDURE — 250N000012 HC RX MED GY IP 250 OP 636 PS 637: Performed by: NURSE PRACTITIONER

## 2022-09-03 PROCEDURE — 93005 ELECTROCARDIOGRAM TRACING: CPT

## 2022-09-03 PROCEDURE — P9045 ALBUMIN (HUMAN), 5%, 250 ML: HCPCS

## 2022-09-03 PROCEDURE — 97161 PT EVAL LOW COMPLEX 20 MIN: CPT | Mod: GP | Performed by: PHYSICAL THERAPIST

## 2022-09-03 PROCEDURE — 258N000003 HC RX IP 258 OP 636: Performed by: PHYSICIAN ASSISTANT

## 2022-09-03 PROCEDURE — 250N000011 HC RX IP 250 OP 636: Performed by: NURSE PRACTITIONER

## 2022-09-03 PROCEDURE — 250N000013 HC RX MED GY IP 250 OP 250 PS 637: Performed by: PHYSICIAN ASSISTANT

## 2022-09-03 PROCEDURE — 71045 X-RAY EXAM CHEST 1 VIEW: CPT

## 2022-09-03 PROCEDURE — 250N000011 HC RX IP 250 OP 636: Performed by: PHYSICIAN ASSISTANT

## 2022-09-03 PROCEDURE — 93010 ELECTROCARDIOGRAM REPORT: CPT | Performed by: INTERNAL MEDICINE

## 2022-09-03 PROCEDURE — P9045 ALBUMIN (HUMAN), 5%, 250 ML: HCPCS | Performed by: PHYSICIAN ASSISTANT

## 2022-09-03 PROCEDURE — 250N000011 HC RX IP 250 OP 636

## 2022-09-03 PROCEDURE — 84132 ASSAY OF SERUM POTASSIUM: CPT | Performed by: SURGERY

## 2022-09-03 PROCEDURE — 200N000001 HC R&B ICU

## 2022-09-03 PROCEDURE — 99291 CRITICAL CARE FIRST HOUR: CPT | Mod: 24 | Performed by: INTERNAL MEDICINE

## 2022-09-03 PROCEDURE — 82330 ASSAY OF CALCIUM: CPT | Performed by: PHYSICIAN ASSISTANT

## 2022-09-03 PROCEDURE — 82310 ASSAY OF CALCIUM: CPT | Performed by: PHYSICIAN ASSISTANT

## 2022-09-03 PROCEDURE — 84100 ASSAY OF PHOSPHORUS: CPT | Performed by: PHYSICIAN ASSISTANT

## 2022-09-03 PROCEDURE — 97530 THERAPEUTIC ACTIVITIES: CPT | Mod: GP | Performed by: PHYSICAL THERAPIST

## 2022-09-03 PROCEDURE — 250N000011 HC RX IP 250 OP 636: Performed by: SURGERY

## 2022-09-03 RX ORDER — FUROSEMIDE 10 MG/ML
10 INJECTION INTRAMUSCULAR; INTRAVENOUS
Status: COMPLETED | OUTPATIENT
Start: 2022-09-03 | End: 2022-09-03

## 2022-09-03 RX ORDER — SIMETHICONE 80 MG
80 TABLET,CHEWABLE ORAL 4 TIMES DAILY
Status: COMPLETED | OUTPATIENT
Start: 2022-09-03 | End: 2022-09-03

## 2022-09-03 RX ORDER — FUROSEMIDE 10 MG/ML
10 INJECTION INTRAMUSCULAR; INTRAVENOUS ONCE
Status: DISCONTINUED | OUTPATIENT
Start: 2022-09-03 | End: 2022-09-03

## 2022-09-03 RX ORDER — POTASSIUM CHLORIDE 29.8 MG/ML
20 INJECTION INTRAVENOUS ONCE
Status: COMPLETED | OUTPATIENT
Start: 2022-09-03 | End: 2022-09-03

## 2022-09-03 RX ADMIN — ALBUMIN HUMAN 250 ML: 0.05 INJECTION, SOLUTION INTRAVENOUS at 14:53

## 2022-09-03 RX ADMIN — SENNOSIDES AND DOCUSATE SODIUM 1 TABLET: 50; 8.6 TABLET ORAL at 08:53

## 2022-09-03 RX ADMIN — OXYCODONE HYDROCHLORIDE 10 MG: 5 TABLET ORAL at 05:26

## 2022-09-03 RX ADMIN — POLYETHYLENE GLYCOL 3350 17 G: 17 POWDER, FOR SOLUTION ORAL at 08:54

## 2022-09-03 RX ADMIN — HEPARIN SODIUM 5000 UNITS: 5000 INJECTION, SOLUTION INTRAVENOUS; SUBCUTANEOUS at 21:06

## 2022-09-03 RX ADMIN — ACETAMINOPHEN 975 MG: 325 TABLET ORAL at 05:12

## 2022-09-03 RX ADMIN — ASPIRIN 81 MG CHEWABLE TABLET 162 MG: 81 TABLET CHEWABLE at 08:53

## 2022-09-03 RX ADMIN — ALBUMIN HUMAN 250 ML: 0.05 INJECTION, SOLUTION INTRAVENOUS at 10:30

## 2022-09-03 RX ADMIN — MUPIROCIN 0.5 G: 20 OINTMENT TOPICAL at 21:07

## 2022-09-03 RX ADMIN — INSULIN ASPART 1 UNITS: 100 INJECTION, SOLUTION INTRAVENOUS; SUBCUTANEOUS at 18:20

## 2022-09-03 RX ADMIN — SENNOSIDES AND DOCUSATE SODIUM 1 TABLET: 50; 8.6 TABLET ORAL at 21:06

## 2022-09-03 RX ADMIN — CEFAZOLIN 1 G: 1 INJECTION, POWDER, FOR SOLUTION INTRAMUSCULAR; INTRAVENOUS at 08:54

## 2022-09-03 RX ADMIN — OXYCODONE HYDROCHLORIDE 5 MG: 5 TABLET ORAL at 21:07

## 2022-09-03 RX ADMIN — SIMETHICONE 80 MG: 80 TABLET, CHEWABLE ORAL at 13:09

## 2022-09-03 RX ADMIN — MUPIROCIN 0.5 G: 20 OINTMENT TOPICAL at 10:56

## 2022-09-03 RX ADMIN — CEFAZOLIN 1 G: 1 INJECTION, POWDER, FOR SOLUTION INTRAMUSCULAR; INTRAVENOUS at 02:07

## 2022-09-03 RX ADMIN — SIMETHICONE 80 MG: 80 TABLET, CHEWABLE ORAL at 18:20

## 2022-09-03 RX ADMIN — METHOCARBAMOL 500 MG: 500 TABLET ORAL at 16:05

## 2022-09-03 RX ADMIN — LIDOCAINE 1 PATCH: 560 PATCH PERCUTANEOUS; TOPICAL; TRANSDERMAL at 14:16

## 2022-09-03 RX ADMIN — METHOCARBAMOL 500 MG: 500 TABLET ORAL at 11:00

## 2022-09-03 RX ADMIN — PHENYLEPHRINE HYDROCHLORIDE 0.3 MCG/KG/MIN: 10 INJECTION INTRAVENOUS at 14:46

## 2022-09-03 RX ADMIN — SIMETHICONE 80 MG: 80 TABLET, CHEWABLE ORAL at 21:06

## 2022-09-03 RX ADMIN — POTASSIUM CHLORIDE 20 MEQ: 29.8 INJECTION, SOLUTION INTRAVENOUS at 06:38

## 2022-09-03 RX ADMIN — ACETAMINOPHEN 975 MG: 325 TABLET ORAL at 14:16

## 2022-09-03 RX ADMIN — FUROSEMIDE 10 MG: 10 INJECTION, SOLUTION INTRAMUSCULAR; INTRAVENOUS at 22:07

## 2022-09-03 RX ADMIN — HEPARIN SODIUM 5000 UNITS: 5000 INJECTION, SOLUTION INTRAVENOUS; SUBCUTANEOUS at 14:16

## 2022-09-03 RX ADMIN — SIMETHICONE 80 MG: 80 TABLET, CHEWABLE ORAL at 08:54

## 2022-09-03 RX ADMIN — PANTOPRAZOLE SODIUM 40 MG: 40 TABLET, DELAYED RELEASE ORAL at 08:53

## 2022-09-03 ASSESSMENT — ACTIVITIES OF DAILY LIVING (ADL)
ADLS_ACUITY_SCORE: 25
ADLS_ACUITY_SCORE: 24
ADLS_ACUITY_SCORE: 25
ADLS_ACUITY_SCORE: 24
ADLS_ACUITY_SCORE: 20
ADLS_ACUITY_SCORE: 24
ADLS_ACUITY_SCORE: 20
ADLS_ACUITY_SCORE: 24
ADLS_ACUITY_SCORE: 24
ADLS_ACUITY_SCORE: 25
ADLS_ACUITY_SCORE: 25
ADLS_ACUITY_SCORE: 24

## 2022-09-03 NOTE — PLAN OF CARE
Goal Outcome Evaluation:    Plan of Care Reviewed With: spouse, patient     Pathway, despite  pretty labile hemodynamics.  Extubated at 1757, minimal  chest tube output, Phenylephrine on for BP upper 80's low 90's systolic after 500 ml in total of 5% Albumin, diuresing spontaneously, K replaced.  Spouse at bedside, he and patient aware of POC

## 2022-09-03 NOTE — PROGRESS NOTES
[facility]  Critical Care Service  Progress Note  Date of Service (when I saw the patient): 09/03/2022  Main Plans for Today Extubate on pathway if possible  Assessment & Plan   Kalani Rowan is a 71 year old female who was admitted on 8/31/2022. She underwent 3 vessel CAB today without issue.  Neuro  - No issues since extubation    CV  1. S/p 3 vessel CAB  2. HTN  Plan:  -- Cardiac meds per CV surgery  - Hypotension likely due to vasoplegia and on phenylephrine for MAP> 65. Will give small bolus of fluids as CVP is low.    Resp:  Post operative ventilator management- doing well following extubation with chest tube  Plan:  - Pulmonary tileting  - Chest tube care and has drained 537 ml in the last 12 - 18 hrs.     GI/Nutrition  1. No prior hx  Plan:  -- Tolerating sips of water  -- PPI    Renal  1. No prior hx.  Baseline creatinine appears to be 0.7  Plan:  --monitor function and electrolytes as needed with replacement per ICU protocols. - generally avoid nephrotoxic agents such as NSAID, IV contrast unless specifically required  -- adjust medications as needed for renal clearance  -- follow I/O's as appropriate.  -- maintain euvolemia      Endocrine  1. Stress Hyperglycemia  Plan:  --  Insulin gtt per protocol if indicated  -- Keep BG  <180 for optimal healing    Heme:  1. Acute blood loss anemia  2. Coagulopathy   Plan:  -- Monitor hemoglobin.  -- Transfuse to keep > 7.5      General cares:  DVT Prophylaxis: Pneumatic Compression Devices  GI Prophylaxis: PPI  Restraints: Restraints for medical healing needed: YES  Family update by me today: No  Current lines are required for patient management  Access:  Demian Sierra MD  Time Spent on this Encounter   Billing:  I spent 35 minutes bedside and on the inpatient unit today managing the critical care of Kalani Rowan in relation to the issues listed in this note.  Interval History   S/p 3 vessel CAB  Physical Exam   Temp: 99.9  F (37.7  C)  Temp src: Bladder Temp  Min: 95.5  F (35.3  C)  Max: 101.1  F (38.4  C) BP: (!) 85/43 Pulse: 80   Resp: 20 SpO2: 97 % O2 Device: Nasal cannula Oxygen Delivery: 1 LPM  Vitals:    09/01/22 0417 09/03/22 0830   Weight: 58.2 kg (128 lb 6.4 oz) 59.1 kg (130 lb 4.7 oz)     I/O last 3 completed shifts:  In: 4926.99 [P.O.:400; I.V.:4076.99; Other:200]  Out: 4507 [Urine:3970; Chest Tube:537]    GEN: Awake  EYES: PERRL, Anicteric sclera.   HEENT:  Normocephalic, atraumatic, trachea midline  CV: RRR, no gallops, rubs, or murmurs.  PULM/CHEST: Clear breath sounds bilaterally without rhonchi. Clean post surgical wound  GI: soft, non-distenede  : truong catheter in place, urine yellow and clear  EXTREMITIES: no peripheral edema, moving all extremities, peripheral pulses intact  NEURO: sedated  SKIN: No rashes, sores or ulcerations      Data   Recent Labs   Lab 09/03/22  0833 09/03/22  0634 09/03/22  0409 09/02/22  2359 09/02/22  2305 09/02/22  2152 09/02/22  2108 09/02/22  1441 09/02/22  1429 09/02/22  1226   WBC  --   --  16.7*  --   --   --   --   --  17.2* 22.3*   HGB  --   --  9.6*  --   --   --   --   --  10.7* 7.9*   MCV  --   --  92  --   --   --   --   --  90 91   PLT  --   --  188  --   --   --   --   --  191 162   INR  --   --   --   --   --   --   --   --  1.26* 1.55*   NA  --   --  141  --  141  --   --   --  143 144   POTASSIUM  --   --  3.8  3.8  --  3.8  --  4.2  --  3.3* 3.9   CHLORIDE  --   --  112*  --  111*  --   --   --  114* 115*   CO2  --   --  26  --  23  --   --   --  23 24   BUN  --   --  13  --  13  --   --   --  13 14   CR  --   --  0.53  --  0.68  --   --   --  0.59 0.51*   ANIONGAP  --   --  3  --  7  --   --   --  6 5   APRIL  --   --  8.5  --  8.6  --   --   --  8.1* 8.2*   * 123* 104*   < > 162*   < >  --    < > 154* 172*   ALBUMIN  --   --   --   --  3.3*  --   --   --  2.6*  --    PROTTOTAL  --   --   --   --  5.3*  --   --   --  4.6*  --    BILITOTAL  --   --   --   --  0.7  --   --   --   0.6  --    ALKPHOS  --   --   --   --  50  --   --   --  48  --    ALT  --   --   --   --  34  --   --   --  36  --    AST  --   --   --   --  55*  --   --   --  68*  --     < > = values in this interval not displayed.     Time Spent on this Encounter     Total Critical Care time spent by me, excluding procedures, was 40 minutes.    MD Demian Jeffries MD  Pulmonary, Critical Care and Sleep Medicine  [facility]  Pager: [phone]

## 2022-09-03 NOTE — PLAN OF CARE
Goal Outcome Evaluation:          Pathway, despite pretty labile hemodynamics.  Up to chair at 0100, minimal  chest tube output, Phenylephrine on for BP to maintain MAP >65, diuresing spontaneously, K replaced.  Spouse to visit this AM. Slept well overnight between cares.

## 2022-09-03 NOTE — PLAN OF CARE
Goal Outcome Evaluation:    Plan of Care Reviewed With: spouse, patient     Continues with need for pressor support, poor UOP discussed with Vannesa Mike earlier in shift and then Leonela Terry, will observe over next several hours to see if pt mobilizes fluid or may need Lasix.  Pain controlled well with mostly non-narcotic agents, up most of the day.  Spouse here most of the day, aware of POC

## 2022-09-03 NOTE — PROGRESS NOTES
"   09/03/22 0850   Quick Adds   Type of Visit Initial PT Evaluation   Living Environment   People in Home spouse   Current Living Arrangements house   Home Accessibility stairs to enter home;stairs within home   Stair Railings, Within Home, Primary railings safe and in good condition   Transportation Anticipated car, drives self;family or friend will provide   Living Environment Comments Bedrooms are on the main level.   Self-Care   Usual Activity Tolerance good   Current Activity Tolerance fair   Regular Exercise Yes   Activity/Exercise Type walking   Exercise Amount/Frequency 45 mins;3-5 times/wk   Equipment Currently Used at Home none   Fall history within last six months no   Activity/Exercise/Self-Care Comment Reports independence with all functional mobility at baseline, no assist for ADLs or IADLs. \"I just do it all.\"   General Information   Onset of Illness/Injury or Date of Surgery 09/10/22   Referring Physician Vannesa Mike PA-C   Patient/Family Therapy Goals Statement (PT) Home with  assist as needed.   Pertinent History of Current Problem (include personal factors and/or comorbidities that impact the POC) 72 yo female POD#1 CABG x 3. Had been adm with SOB and chest pain. Took nitro at home ('s meds) and passed out. Work up revealed multi vessel coronary artery disease. Of note pt with recent COVID pneumonia, on a sterioid taper and inhaler for symptoms.   Existing Precautions/Restrictions fall;cardiac;sternal   Cognition   Affect/Mental Status (Cognition) WNL   Orientation Status (Cognition) oriented x 4   Follows Commands (Cognition) WNL   Pain Assessment   Patient Currently in Pain No   Integumentary/Edema   Integumentary/Edema Comments Multiple lines, SWAN, CT, truong, IV. Still on some pressors. R Radial art line.   Posture    Posture Forward head position;Protracted shoulders   Range of Motion (ROM)   ROM Comment B LEs WFL, B UEs WFL within sternal prec.   Strength (Manual Muscle " Testing)   Strength Comments Demonstrates antigravity strength with mobility. Post op weakness, fatigue and impaired activity tolerance from baseline noted.   Bed Mobility   Comment, (Bed Mobility) sup>sit Min A at the trunk, and for hand/arm placement for adhearance to sternal prec   Transfers   Comment, (Transfers) Sit>stand CGA, slow to scoot and reach floor with feet. CGA/Min A fro weight shift and balance up to stand.   Gait/Stairs (Locomotion)   Comment, (Gait/Stairs) Bedside gait with Min A for balance, cues to exhale and for stepping sequence.   Balance   Balance Comments Sitting balance fair + posterior tendency, CGA to Min A for safety and input cues.   Sensory Examination   Sensory Perception Comments Denies n/t B UEs or LEs, grossly intact to light touch. Feet are quite cool to the touch. Palpable pulses.   Clinical Impression   Criteria for Skilled Therapeutic Intervention Yes, treatment indicated   PT Diagnosis (PT) Impaired funtional activity tolerance   Influenced by the following impairments Weakness, fatiuge, impaired balance, post op weakness and sternal precuations   Functional limitations due to impairments Decreased functional independence   Clinical Presentation (PT Evaluation Complexity) Stable/Uncomplicated   Clinical Presentation Rationale see MR   Clinical Decision Making (Complexity) low complexity   Planned Therapy Interventions (PT) balance training;bed mobility training;gait training;home exercise program;neuromuscular re-education;patient/family education;ROM (range of motion);stair training;strengthening;stretching;transfer training;progressive activity/exercise;risk factor education;home program guidelines   Risk & Benefits of therapy have been explained evaluation/treatment results reviewed;care plan/treatment goals reviewed;risks/benefits reviewed;current/potential barriers reviewed;participants voiced agreement with care plan;participants included;patient   PT Discharge Planning    PT Discharge Recommendation (DC Rec) home with outpatient cardiac rehab;home with assist   PT Rationale for DC Rec Anticipate pt to progress to SBA or less with all funcitonal mobility, will benefit from supervision at home with mobility and HEP. Will benefit form OP CR for closely monitored exercise/activity progression as well as continued education for optimal heart health.   PT Brief overview of current status Sup>sit Min A. Sit<>Stand Min A. Bed>chair, Min A plus one for lines.   Total Evaluation Time   Total Evaluation Time (Minutes) 10   Physical Therapy Goals   PT Frequency 2x/day   PT Predicted Duration/Target Date for Goal Attainment 09/10/22   PT Goals Bed Mobility;Cardiac Phase 1   PT: Bed Mobility Supervision/stand-by assist;Supine to/from sit;Within precautions   PT: Understanding of cardiac education to maximize quality of life, condition management, and health outcomes Patient;Verbalize;Demonstrate   PT: Perform aerobic activity with stable cardiovascular response continuous;15 minutes;20 minutes;ambulation;NuStep;treadmill   PT: Functional/aerobic ambulation tolerance with stable cardiovascular response in order to return to home and community environment Supervision/SBA;Greater than 300 feet   PT: Navigation of stairs simulating home set up with stable cardiovascular response in order to return to home and community environment Supervision/SBA;Greater than 10 stairs;Rail on both sides

## 2022-09-03 NOTE — PROGRESS NOTES
[facility]  Cardiovascular and Thoracic Surgery Daily Note      Assessment and Plan  POD # 1 s/p Coronary artery bypass grafting x 3              - Left internal mammary artery to left anterior descending artery              - Reversed saphenous vein graft to right posterior descending artery              - Reversed saphenous vein graft to obtuse marginal artery   2) Endoscopic vein harvest left and right lower extremity  3) Transesophageal echocardiogram on 22 with Dr. Berto Tsai    - CVS: Pre-op TTE with EF 45-50%, on florencia, wean as able, 250 ml albumin this am, ASA, BB when able, statin, sub q heparin, overall net negative from OR, received 500 ml albumin, may need more.      - Resp: Extubated per protocol. IS, pulmonary toilet, sating well on 1L NC, wean as able    - Neuro: Neuro intact, pain controlled on current regimen    - Renal: adequate UO, weight needs to be entered in computer  Recent Labs   Lab 22  2305 22  1429 22  1226   CR 0.68 0.59 0.51*       - GI: -BM, continue bowel regimen    - : truong, limited mobility, accurate Is & Os    - Endo: Insulin infusion   Hemoglobin A1C   Date Value Ref Range Status   2022 6.0 (H) 0.0 - 5.6 % Final     Comment:     Normal <5.7%   Prediabetes 5.7-6.4%    Diabetes 6.5% or higher     Note: Adopted from ADA consensus guidelines.   2019 5.3 0 - 5.6 % Final     Comment:     Normal <5.7% Prediabetes 5.7-6.4%  Diabetes 6.5% or higher - adopted from ADA   consensus guidelines.          - FEN: Replace electrolytes as needed. Regular diet    - ID: Afebrile, Temp (24hrs), Av.8  F (37.7  C), Min:95.5  F (35.3  C), Max:101.1  F (38.4  C).  WBC 16.7. Periop abx completing. Trend CBC.   Recent Labs   Lab 22  0409 22  1429 22  1226   WBC 16.7* 17.2* 22.3*       - Heme: Acute blood loss anemia and thrombocytopenia due to surgery. Hgb and PLT wnl after surgery. Trend CBC, transfuse PRN.   Recent Labs   Lab  "09/03/22  0409 09/02/22  1429 09/02/22  1226   HGB 9.6* 10.7* 7.9*    191 162       - Proph: SCD, subcutaneous heparin, PPI    - Dispo: ICU, may transfer to [facility] if able to come off florencia later today      Interval History  Sitting up in bed, tired, pain controlled, breathing stable, tolerating clears      Medications    acetaminophen  975 mg Oral Q8H     aspirin  162 mg Oral or NG Tube Daily     ceFAZolin  1 g Intravenous Q8H     heparin ANTICOAGULANT  5,000 Units Subcutaneous Q8H     lactated ringers  250 mL Intravenous Once     Lidocaine  1 patch Transdermal Q24H     lidocaine   Transdermal Q8H DEVON     mupirocin  0.5 g Both Nostrils BID     pantoprazole  40 mg Oral or NG Tube Daily    Or     pantoprazole  40 mg Oral Daily     polyethylene glycol  17 g Oral Daily     senna-docusate  1 tablet Oral BID     simethicone  80 mg Oral 4x Daily     sodium chloride (PF)  3 mL Intracatheter Q8H     [START ON 9/5/2022] acetaminophen, bisacodyl, calcium gluconate, calcium gluconate, calcium gluconate, dextrose, glucose **OR** dextrose **OR** glucagon, EPINEPHrine, hydrALAZINE, HYDROmorphone **OR** HYDROmorphone, lidocaine 4%, lidocaine (buffered or not buffered), magnesium hydroxide, methocarbamol, naloxone **OR** naloxone **OR** naloxone **OR** naloxone, nitroGLYcerin, ondansetron **OR** ondansetron, oxyCODONE **OR** oxyCODONE, phenylephrine, prochlorperazine **OR** prochlorperazine, BETA BLOCKER NOT PRESCRIBED, sodium chloride (PF)      Physical Exam  Vitals were reviewed  Blood pressure 95/51, pulse 86, temperature 100.2  F (37.9  C), resp. rate 12, height 1.5 m (4' 11.06\"), weight 58.2 kg (128 lb 6.4 oz), SpO2 98 %, not currently breastfeeding.  Rhythm: NSR    Lungs: diminished bases    Cardiovascular: rrr, no m/r/g    Abdomen: soft, NT, ND, +BS    Extremeties: warm, no LE edema    Incision: CDI    CT: 537 serosang output, no air leak    Weight:   Vitals:    09/01/22 0417   Weight: 58.2 kg (128 lb 6.4 oz) "         Data  [unfilled]    Imaging:  Recent Results (from the past 24 hour(s))   XR Chest Port 1 View    Narrative    CHEST ONE VIEW  September 2, 2022 3:05 PM     HISTORY: Postoperative CVTS Surgery.    COMPARISON: August 31, 2022.      Impression    IMPRESSION: Endotracheal tube tip 3.7 cm from the van. Right IJ  Dover-Nadeen catheter probably in the right interlobar pulmonary artery.  Chest tubes in place, no pneumothorax demonstrated. Lungs appear  clear.    OSMAN PUCKETT MD         SYSTEM ID:  [id number]   XR Chest Port 1 View    Narrative    EXAM: CHEST SINGLE VIEW PORTABLE  LOCATION: [facility]  DATE/TIME: 9/2/2022 10:30 PM    INDICATION: Confirming location of pulmonary arterial catheter.  COMPARISON: 9/2/2022 at 1432 hours.      Impression    IMPRESSION:   1. A right internal jugular pulmonary arterial catheter is again in place. The distal catheter tip projects over the right hilum. Evaluation for interval change since the recent comparison study is limited due to mild rotation of the patient to the right   on this study as compared to the prior study.  2. Interval removal of an endotracheal tube and enteric drainage tube.  3. Two mediastinal drains and a left pleural drain are again noted.  4. No evidence of active cardiopulmonary disease.    XR Chest Port 1 View    Narrative    EXAM: CHEST SINGLE VIEW PORTABLE  LOCATION: [facility]  DATE/TIME: 09/03/2022, 6:19 AM    INDICATION: Status post thoracic surgery.  COMPARISON: 09/02/2022 at 2219 hours.      Impression    IMPRESSION:   1. No significant interval change since the recent comparison study.  2. A right internal jugular pulmonary arterial catheter, a left pleural drainage catheter, and two mediastinal drains are again noted.  3. No evidence of active cardiopulmonary disease.            Patient seen and discussed with Dr. Quin Mike PA-C  Cardiothoracic Surgery  Pager [phone]

## 2022-09-03 NOTE — PROGRESS NOTES
Notified provider about indwelling truong catheter discussed removal or continued need.    Did provider choose to remove indwelling truong catheter? No    Provider's truong indication for keeping indwelling truong catheter: Strict 1-2 Hour I & O if external catheters are not an option.    Is there an order for indwelling truong catheter? Yes    *If there is a plan to keep truong catheter in place at discharge daily notification with provider is not necessary.

## 2022-09-03 NOTE — PROGRESS NOTES
Urine output has been low this shift, call to DONELL Oneal, will give another 250cc of 5% Albumin.

## 2022-09-04 ENCOUNTER — APPOINTMENT (OUTPATIENT)
Dept: PHYSICAL THERAPY | Facility: CLINIC | Age: 72
DRG: 234 | End: 2022-09-04
Payer: COMMERCIAL

## 2022-09-04 LAB
ALBUMIN SERPL-MCNC: 3 G/DL (ref 3.4–5)
ALP SERPL-CCNC: 38 U/L (ref 40–150)
ALT SERPL W P-5'-P-CCNC: 21 U/L (ref 0–50)
ANION GAP SERPL CALCULATED.3IONS-SCNC: 3 MMOL/L (ref 3–14)
ANION GAP SERPL CALCULATED.3IONS-SCNC: 4 MMOL/L (ref 3–14)
AST SERPL W P-5'-P-CCNC: 22 U/L (ref 0–45)
ATRIAL RATE - MUSE: 68 BPM
BILIRUB SERPL-MCNC: 1.1 MG/DL (ref 0.2–1.3)
BUN SERPL-MCNC: 16 MG/DL (ref 7–30)
BUN SERPL-MCNC: 16 MG/DL (ref 7–30)
CA-I BLD-MCNC: 4.9 MG/DL (ref 4.4–5.2)
CALCIUM SERPL-MCNC: 8.2 MG/DL (ref 8.5–10.1)
CALCIUM SERPL-MCNC: 8.3 MG/DL (ref 8.5–10.1)
CHLORIDE BLD-SCNC: 109 MMOL/L (ref 94–109)
CHLORIDE BLD-SCNC: 110 MMOL/L (ref 94–109)
CO2 SERPL-SCNC: 27 MMOL/L (ref 20–32)
CO2 SERPL-SCNC: 28 MMOL/L (ref 20–32)
CREAT SERPL-MCNC: 0.51 MG/DL (ref 0.52–1.04)
CREAT SERPL-MCNC: 0.54 MG/DL (ref 0.52–1.04)
DIASTOLIC BLOOD PRESSURE - MUSE: NORMAL MMHG
ERYTHROCYTE [DISTWIDTH] IN BLOOD BY AUTOMATED COUNT: 13.5 % (ref 10–15)
ERYTHROCYTE [DISTWIDTH] IN BLOOD BY AUTOMATED COUNT: 13.8 % (ref 10–15)
GFR SERPL CREATININE-BSD FRML MDRD: >90 ML/MIN/1.73M2
GFR SERPL CREATININE-BSD FRML MDRD: >90 ML/MIN/1.73M2
GLUCOSE BLD-MCNC: 130 MG/DL (ref 70–99)
GLUCOSE BLD-MCNC: 131 MG/DL (ref 70–99)
GLUCOSE BLDC GLUCOMTR-MCNC: 114 MG/DL (ref 70–99)
GLUCOSE BLDC GLUCOMTR-MCNC: 118 MG/DL (ref 70–99)
GLUCOSE BLDC GLUCOMTR-MCNC: 122 MG/DL (ref 70–99)
GLUCOSE BLDC GLUCOMTR-MCNC: 130 MG/DL (ref 70–99)
HCT VFR BLD AUTO: 26.4 % (ref 35–47)
HCT VFR BLD AUTO: 26.6 % (ref 35–47)
HGB BLD-MCNC: 8.5 G/DL (ref 11.7–15.7)
HGB BLD-MCNC: 8.7 G/DL (ref 11.7–15.7)
INTERPRETATION ECG - MUSE: NORMAL
MCH RBC QN AUTO: 29.7 PG (ref 26.5–33)
MCH RBC QN AUTO: 30.2 PG (ref 26.5–33)
MCHC RBC AUTO-ENTMCNC: 32 G/DL (ref 31.5–36.5)
MCHC RBC AUTO-ENTMCNC: 33 G/DL (ref 31.5–36.5)
MCV RBC AUTO: 92 FL (ref 78–100)
MCV RBC AUTO: 93 FL (ref 78–100)
P AXIS - MUSE: 80 DEGREES
PLATELET # BLD AUTO: 111 10E3/UL (ref 150–450)
PLATELET # BLD AUTO: 127 10E3/UL (ref 150–450)
POTASSIUM BLD-SCNC: 3.3 MMOL/L (ref 3.4–5.3)
POTASSIUM BLD-SCNC: 3.3 MMOL/L (ref 3.4–5.3)
PR INTERVAL - MUSE: 184 MS
PROT SERPL-MCNC: 5 G/DL (ref 6.8–8.8)
QRS DURATION - MUSE: 86 MS
QT - MUSE: 514 MS
QTC - MUSE: 546 MS
R AXIS - MUSE: 8 DEGREES
RBC # BLD AUTO: 2.86 10E6/UL (ref 3.8–5.2)
RBC # BLD AUTO: 2.88 10E6/UL (ref 3.8–5.2)
SODIUM SERPL-SCNC: 140 MMOL/L (ref 133–144)
SODIUM SERPL-SCNC: 141 MMOL/L (ref 133–144)
SYSTOLIC BLOOD PRESSURE - MUSE: NORMAL MMHG
T AXIS - MUSE: -71 DEGREES
VENTRICULAR RATE- MUSE: 68 BPM
WBC # BLD AUTO: 12.7 10E3/UL (ref 4–11)
WBC # BLD AUTO: 13.2 10E3/UL (ref 4–11)

## 2022-09-04 PROCEDURE — 999N000128 HC STATISTIC PERIPHERAL IV START W/O US GUIDANCE

## 2022-09-04 PROCEDURE — 200N000001 HC R&B ICU

## 2022-09-04 PROCEDURE — 97110 THERAPEUTIC EXERCISES: CPT | Mod: GP | Performed by: PHYSICAL THERAPIST

## 2022-09-04 PROCEDURE — 97530 THERAPEUTIC ACTIVITIES: CPT | Mod: GP | Performed by: PHYSICAL THERAPIST

## 2022-09-04 PROCEDURE — 36415 COLL VENOUS BLD VENIPUNCTURE: CPT | Performed by: PHYSICIAN ASSISTANT

## 2022-09-04 PROCEDURE — 80048 BASIC METABOLIC PNL TOTAL CA: CPT | Performed by: PHYSICIAN ASSISTANT

## 2022-09-04 PROCEDURE — 85027 COMPLETE CBC AUTOMATED: CPT | Performed by: PHYSICIAN ASSISTANT

## 2022-09-04 PROCEDURE — 83735 ASSAY OF MAGNESIUM: CPT | Performed by: STUDENT IN AN ORGANIZED HEALTH CARE EDUCATION/TRAINING PROGRAM

## 2022-09-04 PROCEDURE — 99291 CRITICAL CARE FIRST HOUR: CPT | Mod: 24 | Performed by: INTERNAL MEDICINE

## 2022-09-04 PROCEDURE — 258N000003 HC RX IP 258 OP 636: Performed by: PHYSICIAN ASSISTANT

## 2022-09-04 PROCEDURE — 250N000013 HC RX MED GY IP 250 OP 250 PS 637: Performed by: PHYSICIAN ASSISTANT

## 2022-09-04 PROCEDURE — 82330 ASSAY OF CALCIUM: CPT | Performed by: PHYSICIAN ASSISTANT

## 2022-09-04 PROCEDURE — 250N000011 HC RX IP 250 OP 636: Performed by: PHYSICIAN ASSISTANT

## 2022-09-04 PROCEDURE — 250N000011 HC RX IP 250 OP 636: Performed by: SURGERY

## 2022-09-04 PROCEDURE — 80053 COMPREHEN METABOLIC PANEL: CPT | Performed by: PHYSICIAN ASSISTANT

## 2022-09-04 PROCEDURE — 84100 ASSAY OF PHOSPHORUS: CPT | Performed by: STUDENT IN AN ORGANIZED HEALTH CARE EDUCATION/TRAINING PROGRAM

## 2022-09-04 PROCEDURE — 85027 COMPLETE CBC AUTOMATED: CPT | Performed by: SURGERY

## 2022-09-04 RX ORDER — FUROSEMIDE 10 MG/ML
10 INJECTION INTRAMUSCULAR; INTRAVENOUS ONCE
Status: COMPLETED | OUTPATIENT
Start: 2022-09-04 | End: 2022-09-04

## 2022-09-04 RX ADMIN — HEPARIN SODIUM 5000 UNITS: 5000 INJECTION, SOLUTION INTRAVENOUS; SUBCUTANEOUS at 22:01

## 2022-09-04 RX ADMIN — MUPIROCIN 0.5 G: 20 OINTMENT TOPICAL at 12:06

## 2022-09-04 RX ADMIN — ACETAMINOPHEN 975 MG: 325 TABLET ORAL at 22:01

## 2022-09-04 RX ADMIN — PANTOPRAZOLE SODIUM 40 MG: 40 TABLET, DELAYED RELEASE ORAL at 08:56

## 2022-09-04 RX ADMIN — PHENYLEPHRINE HYDROCHLORIDE 0.3 MCG/KG/MIN: 10 INJECTION INTRAVENOUS at 07:50

## 2022-09-04 RX ADMIN — FUROSEMIDE 10 MG: 10 INJECTION, SOLUTION INTRAMUSCULAR; INTRAVENOUS at 04:41

## 2022-09-04 RX ADMIN — METHOCARBAMOL 500 MG: 500 TABLET ORAL at 01:01

## 2022-09-04 RX ADMIN — SENNOSIDES AND DOCUSATE SODIUM 1 TABLET: 50; 8.6 TABLET ORAL at 08:56

## 2022-09-04 RX ADMIN — POLYETHYLENE GLYCOL 3350 17 G: 17 POWDER, FOR SOLUTION ORAL at 08:56

## 2022-09-04 RX ADMIN — ACETAMINOPHEN 975 MG: 325 TABLET ORAL at 05:43

## 2022-09-04 RX ADMIN — MUPIROCIN 0.5 G: 20 OINTMENT TOPICAL at 22:00

## 2022-09-04 RX ADMIN — HEPARIN SODIUM 5000 UNITS: 5000 INJECTION, SOLUTION INTRAVENOUS; SUBCUTANEOUS at 14:17

## 2022-09-04 RX ADMIN — ACETAMINOPHEN 975 MG: 325 TABLET ORAL at 14:15

## 2022-09-04 RX ADMIN — HEPARIN SODIUM 5000 UNITS: 5000 INJECTION, SOLUTION INTRAVENOUS; SUBCUTANEOUS at 05:43

## 2022-09-04 RX ADMIN — METHOCARBAMOL 500 MG: 500 TABLET ORAL at 19:58

## 2022-09-04 RX ADMIN — OXYCODONE HYDROCHLORIDE 5 MG: 5 TABLET ORAL at 01:01

## 2022-09-04 RX ADMIN — HYDROMORPHONE HYDROCHLORIDE 0.2 MG: 0.2 INJECTION, SOLUTION INTRAMUSCULAR; INTRAVENOUS; SUBCUTANEOUS at 06:00

## 2022-09-04 RX ADMIN — ASPIRIN 81 MG CHEWABLE TABLET 162 MG: 81 TABLET CHEWABLE at 08:56

## 2022-09-04 RX ADMIN — SENNOSIDES AND DOCUSATE SODIUM 1 TABLET: 50; 8.6 TABLET ORAL at 21:58

## 2022-09-04 ASSESSMENT — ACTIVITIES OF DAILY LIVING (ADL)
ADLS_ACUITY_SCORE: 20
ADLS_ACUITY_SCORE: 20
ADLS_ACUITY_SCORE: 28
ADLS_ACUITY_SCORE: 20
ADLS_ACUITY_SCORE: 20
ADLS_ACUITY_SCORE: 28
ADLS_ACUITY_SCORE: 20
ADLS_ACUITY_SCORE: 28
ADLS_ACUITY_SCORE: 20

## 2022-09-04 NOTE — PROGRESS NOTES
[facility]  Cardiovascular and Thoracic Surgery Daily Note      Assessment and Plan  POD # 2 s/p Coronary artery bypass grafting x 3              - Left internal mammary artery to left anterior descending artery              - Reversed saphenous vein graft to right posterior descending artery              - Reversed saphenous vein graft to obtuse marginal artery   2) Endoscopic vein harvest left and right lower extremity  3) Transesophageal echocardiogram on 22 with Dr. Berto Tsai    - CVS: Pre-op TTE with EF 45-50%, back on florencia, wean as able after orthostasis episode, ASA, BB as able, statin, sub q heparin.      - Resp: Extubated per protocol. IS, pulmonary toilet, sating well on 1L NC, wean as able    - Neuro: Neuro intact, pain controlled on current regimen    - Renal: adequate UO, at pre-op wt  Recent Labs   Lab 22   CR 0.51* 0.54 0.53       - GI: -BM, continue bowel regimen    - : Remove Gandhi     - Endo: Insulin infusion   Hemoglobin A1C   Date Value Ref Range Status   2022 6.0 (H) 0.0 - 5.6 % Final     Comment:     Normal <5.7%   Prediabetes 5.7-6.4%    Diabetes 6.5% or higher     Note: Adopted from ADA consensus guidelines.   2019 5.3 0 - 5.6 % Final     Comment:     Normal <5.7% Prediabetes 5.7-6.4%  Diabetes 6.5% or higher - adopted from ADA   consensus guidelines.          - FEN: Replace electrolytes as needed. Regular diet    - ID: Afebrile, Temp (24hrs), Av  F (37.8  C), Min:99.3  F (37.4  C), Max:100.6  F (38.1  C).  WBC 12.7<16.7. Periop abx completing. Trend CBC.   Recent Labs   Lab 22   WBC 12.7* 13.2* 16.7*       - Heme: Acute blood loss anemia and thrombocytopenia due to surgery. Hgb and PLT wnl after surgery. Trend CBC, transfuse PRN.   Recent Labs   Lab 22  0009 22  0409   HGB 8.5* 8.7* 9.6*   * 127* 188       - Proph: SCD,  "subcutaneous heparin, PPI    - Dispo: ICU, may transfer to [facility] if able to come off florencia later today      Interval History  Reclined in chair after having hypotensive episode (orthostasis) with rehab this am, denies SOB/dizzyness currently, tolerating clears-ADAT      Medications    acetaminophen  975 mg Oral Q8H     aspirin  162 mg Oral or NG Tube Daily     heparin ANTICOAGULANT  5,000 Units Subcutaneous Q8H     insulin aspart  1-7 Units Subcutaneous TID AC     insulin aspart  1-5 Units Subcutaneous At Bedtime     lactated ringers  250 mL Intravenous Once     Lidocaine  1 patch Transdermal Q24H     lidocaine   Transdermal Q8H DEVON     mupirocin  0.5 g Both Nostrils BID     pantoprazole  40 mg Oral or NG Tube Daily    Or     pantoprazole  40 mg Oral Daily     polyethylene glycol  17 g Oral Daily     senna-docusate  1 tablet Oral BID     sodium chloride (PF)  3 mL Intracatheter Q8H     [START ON 9/5/2022] acetaminophen, bisacodyl, calcium gluconate, calcium gluconate, calcium gluconate, dextrose, glucose **OR** dextrose **OR** glucagon, EPINEPHrine, hydrALAZINE, HYDROmorphone **OR** HYDROmorphone, lactated ringers, lidocaine 4%, lidocaine (buffered or not buffered), magnesium hydroxide, methocarbamol, naloxone **OR** naloxone **OR** naloxone **OR** naloxone, nitroGLYcerin, ondansetron **OR** ondansetron, oxyCODONE **OR** oxyCODONE, phenylephrine, prochlorperazine **OR** prochlorperazine, BETA BLOCKER NOT PRESCRIBED, sodium chloride (PF)      Physical Exam  Vitals were reviewed  Blood pressure 95/48, pulse 78, temperature 99.3  F (37.4  C), resp. rate (!) 43, height 1.5 m (4' 11.06\"), weight 58.7 kg (129 lb 6.6 oz), SpO2 95 %, not currently breastfeeding.  Rhythm: NSR    Lungs: diminished bases    Cardiovascular: rrr, no m/r/g    Abdomen: soft, NT, ND, +BS    Extremeties: warm, no LE edema    Incision: CDI    CT: 350<537 serosang output, no air leak    Weight:   Vitals:    09/01/22 0417 09/03/22 0830 09/04/22 0400 "   Weight: 58.2 kg (128 lb 6.4 oz) 59.1 kg (130 lb 4.7 oz) 58.7 kg (129 lb 6.6 oz)         Data  [unfilled]    Imaging:  No results found for this or any previous visit (from the past 24 hour(s)).      Patient seen and discussed with Dr. Quin Mike PA-C  Cardiothoracic Surgery  Pager [phone]

## 2022-09-04 NOTE — PLAN OF CARE
Goal Outcome Evaluation:    Pressors off at 0500. 10mg lasix given x2 for low UOP with good response.  Pain controlled well.  Spouse updated this AM. Slept well overnight between cares.

## 2022-09-04 NOTE — PLAN OF CARE
Goal Outcome Evaluation:      Pathway except still requiring low dose Phenylephrine after episode this morning of acute hypotension and near syncope during cardiac rehab.  Moving well and up to chair with minimal assistance multiple times today, eating, drinking well.  Gandhi discontinued, due to void by midnight.

## 2022-09-04 NOTE — PROGRESS NOTES
[facility]  Critical Care Service  Progress Note  Date of Service (when I saw the patient): 09/04/2022    Assessment & Plan   Kalani Rowan is a 71 year old female who was admitted on 8/31/2022. She underwent 3 vessel CAB today without issue. Had an orthostatic episode yesterday and phenylephrine was restarted after initial wean off.  Neuro  - No issues since extubation    CV  1. S/p 3 vessel CAB  2. HTN  Plan:  -- Cardiac meds per CV surgery  - Hypotension likely due to vasoplegia and on phenylephrine for MAP> 65. Pressor requirements are downtrending    Resp:  Post operative ventilator management- doing well following extubation with chest tube  Plan:  - Pulmonary tileting  - Chest tube care and has drained 537 ml in the last 12 - 18 hrs.     GI/Nutrition  1. No prior hx  Plan:  -- Tolerating sips of water  -- PPI    Renal  1. No prior hx.  Baseline creatinine appears to be 0.7  Plan:  --monitor function and electrolytes as needed with replacement per ICU protocols. - generally avoid nephrotoxic agents such as NSAID, IV contrast unless specifically required  -- adjust medications as needed for renal clearance  -- follow I/O's as appropriate.  -- maintain euvolemia      Endocrine  1. Stress Hyperglycemia  Plan:  --  Insulin gtt per protocol if indicated  -- Keep BG  <180 for optimal healing    Heme:  1. Acute blood loss anemia  2. Coagulopathy   Plan:  -- Monitor hemoglobin.  -- Transfuse to keep > 7.5      General cares:  DVT Prophylaxis: Pneumatic Compression Devices  GI Prophylaxis: PPI  Restraints: Restraints for medical healing needed: YES  Family update by me today: No  Current lines are required for patient management  Access:  Demian Sierra MD  Time Spent on this Encounter   Billing:  I spent 35 minutes bedside and on the inpatient unit today managing the critical care of Kalani Rowan in relation to the issues listed in this note.  Interval History   S/p 3 vessel CAB  Physical  Exam   Temp: 99.3  F (37.4  C) Temp src: Bladder Temp  Min: 99.3  F (37.4  C)  Max: 100.6  F (38.1  C) BP: 95/48 Pulse: 78   Resp: (!) 43 SpO2: 95 % O2 Device: Nasal cannula Oxygen Delivery: 1 LPM  Vitals:    09/01/22 0417 09/03/22 0830 09/04/22 0400   Weight: 58.2 kg (128 lb 6.4 oz) 59.1 kg (130 lb 4.7 oz) 58.7 kg (129 lb 6.6 oz)     I/O last 3 completed shifts:  In: 2633.22 [P.O.:920; I.V.:1213.22]  Out: 2470 [Urine:2120; Chest Tube:350]    GEN: Awake  EYES: PERRL, Anicteric sclera.   HEENT:  Normocephalic, atraumatic, trachea midline  CV: RRR, no gallops, rubs, or murmurs.  PULM/CHEST: Clear breath sounds bilaterally without rhonchi. Clean post surgical wound  GI: soft, non-distended  : truong catheter in place, urine yellow and clear  EXTREMITIES: no peripheral edema, moving all extremities, peripheral pulses intact  NEURO: sedated  SKIN: No rashes, sores or ulcerations      Data   Recent Labs   Lab 09/04/22  0807 09/04/22  0411 09/04/22  0009 09/03/22  1157 09/03/22  1039 09/03/22  0634 09/03/22  0409 09/02/22  2359 09/02/22  2305 09/02/22  1441 09/02/22  1429 09/02/22  1226   WBC  --  12.7* 13.2*  --   --   --  16.7*  --   --   --  17.2* 22.3*   HGB  --  8.5* 8.7*  --   --   --  9.6*  --   --   --  10.7* 7.9*   MCV  --  93 92  --   --   --  92  --   --   --  90 91   PLT  --  111* 127*  --   --   --  188  --   --   --  191 162   INR  --   --   --   --   --   --   --   --   --   --  1.26* 1.55*   NA  --  140 141  --   --   --  141  --  141  --  143 144   POTASSIUM  --  3.3* 3.3*  --  4.2  --  3.8  3.8  --  3.8   < > 3.3* 3.9   CHLORIDE  --  109 110*  --   --   --  112*  --  111*  --  114* 115*   CO2  --  28 27  --   --   --  26  --  23  --  23 24   BUN  --  16 16  --   --   --  13  --  13  --  13 14   CR  --  0.51* 0.54  --   --   --  0.53  --  0.68  --  0.59 0.51*   ANIONGAP  --  3 4  --   --   --  3  --  7  --  6 5   APRIL  --  8.3* 8.2*  --   --   --  8.5  --  8.6  --  8.1* 8.2*   * 131* 130*   < >  --     < > 104*   < > 162*   < > 154* 172*   ALBUMIN  --  3.0*  --   --   --   --   --   --  3.3*  --  2.6*  --    PROTTOTAL  --  5.0*  --   --   --   --   --   --  5.3*  --  4.6*  --    BILITOTAL  --  1.1  --   --   --   --   --   --  0.7  --  0.6  --    ALKPHOS  --  38*  --   --   --   --   --   --  50  --  48  --    ALT  --  21  --   --   --   --   --   --  34  --  36  --    AST  --  22  --   --   --   --   --   --  55*  --  68*  --     < > = values in this interval not displayed.     Time Spent on this Encounter     Total Critical Care time spent by me, excluding procedures, was 40 minutes.    MD Demian Jeffries MD  Pulmonary, Critical Care and Sleep Medicine  [facility]  Pager: [phone]

## 2022-09-05 ENCOUNTER — APPOINTMENT (OUTPATIENT)
Dept: PHYSICAL THERAPY | Facility: CLINIC | Age: 72
DRG: 234 | End: 2022-09-05
Payer: COMMERCIAL

## 2022-09-05 LAB
ANION GAP SERPL CALCULATED.3IONS-SCNC: 3 MMOL/L (ref 3–14)
ANION GAP SERPL CALCULATED.3IONS-SCNC: 6 MMOL/L (ref 3–14)
BASE EXCESS BLDA CALC-SCNC: -1.4 MMOL/L (ref -9.6–2)
BASE EXCESS BLDA CALC-SCNC: -1.5 MMOL/L (ref -9.6–2)
BASE EXCESS BLDA CALC-SCNC: 0.1 MMOL/L (ref -9.6–2)
BASE EXCESS BLDA CALC-SCNC: 0.8 MMOL/L (ref -9.6–2)
BASE EXCESS BLDA CALC-SCNC: 1.6 MMOL/L (ref -9.6–2)
BASE EXCESS BLDA CALC-SCNC: 2.1 MMOL/L (ref -9.6–2)
BASE EXCESS BLDV CALC-SCNC: 3.2 MMOL/L (ref -8.1–1.9)
BUN SERPL-MCNC: 14 MG/DL (ref 7–30)
BUN SERPL-MCNC: 16 MG/DL (ref 7–30)
CA-I BLD-MCNC: 3.4 MG/DL (ref 4.4–5.2)
CA-I BLD-MCNC: 3.7 MG/DL (ref 4.4–5.2)
CA-I BLD-MCNC: 4 MG/DL (ref 4.4–5.2)
CA-I BLD-MCNC: 4.1 MG/DL (ref 4.4–5.2)
CA-I BLD-MCNC: 4.6 MG/DL (ref 4.4–5.2)
CA-I BLD-MCNC: 4.7 MG/DL (ref 4.4–5.2)
CA-I BLD-MCNC: 4.7 MG/DL (ref 4.4–5.2)
CA-I BLD-MCNC: 5.1 MG/DL (ref 4.4–5.2)
CALCIUM SERPL-MCNC: 8.2 MG/DL (ref 8.5–10.1)
CALCIUM SERPL-MCNC: 8.5 MG/DL (ref 8.5–10.1)
CHLORIDE BLD-SCNC: 108 MMOL/L (ref 94–109)
CHLORIDE BLD-SCNC: 109 MMOL/L (ref 94–109)
CO2 SERPL-SCNC: 27 MMOL/L (ref 20–32)
CO2 SERPL-SCNC: 28 MMOL/L (ref 20–32)
CREAT SERPL-MCNC: 0.54 MG/DL (ref 0.52–1.04)
CREAT SERPL-MCNC: 0.57 MG/DL (ref 0.52–1.04)
ERYTHROCYTE [DISTWIDTH] IN BLOOD BY AUTOMATED COUNT: 13.2 % (ref 10–15)
GFR SERPL CREATININE-BSD FRML MDRD: >90 ML/MIN/1.73M2
GFR SERPL CREATININE-BSD FRML MDRD: >90 ML/MIN/1.73M2
GLUCOSE BLD-MCNC: 104 MG/DL (ref 70–99)
GLUCOSE BLD-MCNC: 109 MG/DL (ref 70–99)
GLUCOSE BLD-MCNC: 110 MG/DL (ref 70–99)
GLUCOSE BLD-MCNC: 114 MG/DL (ref 70–99)
GLUCOSE BLD-MCNC: 122 MG/DL (ref 70–99)
GLUCOSE BLD-MCNC: 129 MG/DL (ref 70–99)
GLUCOSE BLD-MCNC: 162 MG/DL (ref 70–99)
GLUCOSE BLD-MCNC: 167 MG/DL (ref 70–99)
GLUCOSE BLD-MCNC: 94 MG/DL (ref 70–99)
GLUCOSE BLDC GLUCOMTR-MCNC: 111 MG/DL (ref 70–99)
GLUCOSE BLDC GLUCOMTR-MCNC: 112 MG/DL (ref 70–99)
GLUCOSE BLDC GLUCOMTR-MCNC: 125 MG/DL (ref 70–99)
GLUCOSE BLDC GLUCOMTR-MCNC: 126 MG/DL (ref 70–99)
HCO3 BLDA-SCNC: 23 MMOL/L (ref 21–28)
HCO3 BLDA-SCNC: 23 MMOL/L (ref 21–28)
HCO3 BLDA-SCNC: 24 MMOL/L (ref 21–28)
HCO3 BLDA-SCNC: 26 MMOL/L (ref 21–28)
HCO3 BLDA-SCNC: 26 MMOL/L (ref 21–28)
HCO3 BLDA-SCNC: 27 MMOL/L (ref 21–28)
HCO3 BLDV-SCNC: 28 MMOL/L (ref 21–28)
HCT VFR BLD AUTO: 28.2 % (ref 35–47)
HGB BLD-MCNC: 12.7 G/DL (ref 11.7–15.7)
HGB BLD-MCNC: 12.8 G/DL (ref 11.7–15.7)
HGB BLD-MCNC: 7.9 G/DL (ref 11.7–15.7)
HGB BLD-MCNC: 8.2 G/DL (ref 11.7–15.7)
HGB BLD-MCNC: 8.3 G/DL (ref 11.7–15.7)
HGB BLD-MCNC: 8.3 G/DL (ref 11.7–15.7)
HGB BLD-MCNC: 8.4 G/DL (ref 11.7–15.7)
HGB BLD-MCNC: 9.3 G/DL (ref 11.7–15.7)
LACTATE BLD-SCNC: 0.6 MMOL/L
LACTATE BLD-SCNC: 0.8 MMOL/L
LACTATE BLD-SCNC: 1 MMOL/L
LACTATE BLD-SCNC: 1 MMOL/L
LACTATE BLD-SCNC: 1.1 MMOL/L
LACTATE BLD-SCNC: 1.1 MMOL/L
LACTATE BLD-SCNC: 1.3 MMOL/L
MAGNESIUM SERPL-MCNC: 2 MG/DL (ref 1.6–2.3)
MAGNESIUM SERPL-MCNC: 2.1 MG/DL (ref 1.6–2.3)
MCH RBC QN AUTO: 29.5 PG (ref 26.5–33)
MCHC RBC AUTO-ENTMCNC: 33 G/DL (ref 31.5–36.5)
MCV RBC AUTO: 90 FL (ref 78–100)
O2/TOTAL GAS SETTING VFR VENT: 60 %
O2/TOTAL GAS SETTING VFR VENT: 70 %
O2/TOTAL GAS SETTING VFR VENT: 80 %
PCO2 BLDA: 35 MM HG (ref 35–45)
PCO2 BLDA: 37 MM HG (ref 35–45)
PCO2 BLDA: 37 MM HG (ref 35–45)
PCO2 BLDA: 38 MM HG (ref 35–45)
PCO2 BLDA: 42 MM HG (ref 35–45)
PCO2 BLDA: 47 MM HG (ref 35–45)
PCO2 BLDV: 41 MM HG (ref 40–50)
PH BLDA: 7.36 [PH] (ref 7.35–7.45)
PH BLDA: 7.4 [PH] (ref 7.35–7.45)
PH BLDA: 7.41 [PH] (ref 7.35–7.45)
PH BLDA: 7.42 [PH] (ref 7.35–7.45)
PH BLDA: 7.43 [PH] (ref 7.35–7.45)
PH BLDA: 7.46 [PH] (ref 7.35–7.45)
PH BLDV: 7.44 [PH] (ref 7.32–7.43)
PHOSPHATE SERPL-MCNC: 1.2 MG/DL (ref 2.5–4.5)
PHOSPHATE SERPL-MCNC: 2.2 MG/DL (ref 2.5–4.5)
PLATELET # BLD AUTO: 140 10E3/UL (ref 150–450)
PO2 BLDA: 199 MM HG (ref 80–105)
PO2 BLDA: 263 MM HG (ref 80–105)
PO2 BLDA: 392 MM HG (ref 80–105)
PO2 BLDA: 471 MM HG (ref 80–105)
PO2 BLDA: 474 MM HG (ref 80–105)
PO2 BLDA: 507 MM HG (ref 80–105)
PO2 BLDV: 46 MM HG (ref 25–47)
POTASSIUM BLD-SCNC: 3.1 MMOL/L (ref 3.5–5)
POTASSIUM BLD-SCNC: 3.3 MMOL/L (ref 3.4–5.3)
POTASSIUM BLD-SCNC: 3.3 MMOL/L (ref 3.4–5.3)
POTASSIUM BLD-SCNC: 3.3 MMOL/L (ref 3.5–5)
POTASSIUM BLD-SCNC: 3.3 MMOL/L (ref 3.5–5)
POTASSIUM BLD-SCNC: 3.7 MMOL/L (ref 3.5–5)
POTASSIUM BLD-SCNC: 4 MMOL/L (ref 3.4–5.3)
POTASSIUM BLD-SCNC: 4.1 MMOL/L (ref 3.5–5)
POTASSIUM BLD-SCNC: 4.1 MMOL/L (ref 3.5–5)
POTASSIUM BLD-SCNC: 5.2 MMOL/L (ref 3.5–5)
RBC # BLD AUTO: 3.15 10E6/UL (ref 3.8–5.2)
SODIUM BLD-SCNC: 140 MMOL/L (ref 133–144)
SODIUM BLD-SCNC: 141 MMOL/L (ref 133–144)
SODIUM BLD-SCNC: 142 MMOL/L (ref 133–144)
SODIUM SERPL-SCNC: 140 MMOL/L (ref 133–144)
SODIUM SERPL-SCNC: 141 MMOL/L (ref 133–144)
WBC # BLD AUTO: 9.9 10E3/UL (ref 4–11)

## 2022-09-05 PROCEDURE — 97110 THERAPEUTIC EXERCISES: CPT | Mod: GP | Performed by: PHYSICAL THERAPIST

## 2022-09-05 PROCEDURE — 120N000001 HC R&B MED SURG/OB

## 2022-09-05 PROCEDURE — 250N000011 HC RX IP 250 OP 636: Performed by: PHYSICIAN ASSISTANT

## 2022-09-05 PROCEDURE — 83735 ASSAY OF MAGNESIUM: CPT | Performed by: INTERNAL MEDICINE

## 2022-09-05 PROCEDURE — 84100 ASSAY OF PHOSPHORUS: CPT | Performed by: INTERNAL MEDICINE

## 2022-09-05 PROCEDURE — 84132 ASSAY OF SERUM POTASSIUM: CPT | Performed by: PHYSICIAN ASSISTANT

## 2022-09-05 PROCEDURE — 250N000013 HC RX MED GY IP 250 OP 250 PS 637: Performed by: PHYSICIAN ASSISTANT

## 2022-09-05 PROCEDURE — 97530 THERAPEUTIC ACTIVITIES: CPT | Mod: GP | Performed by: PHYSICAL THERAPIST

## 2022-09-05 PROCEDURE — 80048 BASIC METABOLIC PNL TOTAL CA: CPT | Performed by: PHYSICIAN ASSISTANT

## 2022-09-05 PROCEDURE — 250N000013 HC RX MED GY IP 250 OP 250 PS 637: Performed by: INTERNAL MEDICINE

## 2022-09-05 PROCEDURE — 99233 SBSQ HOSP IP/OBS HIGH 50: CPT | Mod: 24 | Performed by: INTERNAL MEDICINE

## 2022-09-05 PROCEDURE — 82330 ASSAY OF CALCIUM: CPT | Performed by: PHYSICIAN ASSISTANT

## 2022-09-05 PROCEDURE — 36415 COLL VENOUS BLD VENIPUNCTURE: CPT | Performed by: PHYSICIAN ASSISTANT

## 2022-09-05 PROCEDURE — 250N000013 HC RX MED GY IP 250 OP 250 PS 637: Performed by: STUDENT IN AN ORGANIZED HEALTH CARE EDUCATION/TRAINING PROGRAM

## 2022-09-05 RX ORDER — POTASSIUM CHLORIDE 20MEQ/15ML
40 LIQUID (ML) ORAL ONCE
Status: COMPLETED | OUTPATIENT
Start: 2022-09-05 | End: 2022-09-05

## 2022-09-05 RX ORDER — TRAMADOL HYDROCHLORIDE 50 MG/1
50 TABLET ORAL EVERY 6 HOURS PRN
Status: DISCONTINUED | OUTPATIENT
Start: 2022-09-05 | End: 2022-09-09 | Stop reason: HOSPADM

## 2022-09-05 RX ADMIN — SENNOSIDES AND DOCUSATE SODIUM 1 TABLET: 50; 8.6 TABLET ORAL at 20:17

## 2022-09-05 RX ADMIN — HEPARIN SODIUM 5000 UNITS: 5000 INJECTION, SOLUTION INTRAVENOUS; SUBCUTANEOUS at 21:36

## 2022-09-05 RX ADMIN — HEPARIN SODIUM 5000 UNITS: 5000 INJECTION, SOLUTION INTRAVENOUS; SUBCUTANEOUS at 06:29

## 2022-09-05 RX ADMIN — METHOCARBAMOL 500 MG: 500 TABLET ORAL at 06:29

## 2022-09-05 RX ADMIN — MAGNESIUM HYDROXIDE 30 ML: 400 SUSPENSION ORAL at 10:46

## 2022-09-05 RX ADMIN — METOPROLOL TARTRATE 6.25 MG: 25 TABLET, FILM COATED ORAL at 20:17

## 2022-09-05 RX ADMIN — ACETAMINOPHEN 975 MG: 325 TABLET ORAL at 06:29

## 2022-09-05 RX ADMIN — MUPIROCIN 0.5 G: 20 OINTMENT TOPICAL at 08:12

## 2022-09-05 RX ADMIN — POTASSIUM & SODIUM PHOSPHATES POWDER PACK 280-160-250 MG 2 PACKET: 280-160-250 PACK at 17:11

## 2022-09-05 RX ADMIN — METHOCARBAMOL 500 MG: 500 TABLET ORAL at 01:54

## 2022-09-05 RX ADMIN — SENNOSIDES AND DOCUSATE SODIUM 1 TABLET: 50; 8.6 TABLET ORAL at 08:17

## 2022-09-05 RX ADMIN — HEPARIN SODIUM 5000 UNITS: 5000 INJECTION, SOLUTION INTRAVENOUS; SUBCUTANEOUS at 13:06

## 2022-09-05 RX ADMIN — POTASSIUM & SODIUM PHOSPHATES POWDER PACK 280-160-250 MG 2 PACKET: 280-160-250 PACK at 20:18

## 2022-09-05 RX ADMIN — MUPIROCIN 0.5 G: 20 OINTMENT TOPICAL at 22:03

## 2022-09-05 RX ADMIN — POTASSIUM CHLORIDE 40 MEQ: 20 SOLUTION ORAL at 06:28

## 2022-09-05 RX ADMIN — ASPIRIN 81 MG CHEWABLE TABLET 162 MG: 81 TABLET CHEWABLE at 08:17

## 2022-09-05 RX ADMIN — PANTOPRAZOLE SODIUM 40 MG: 40 TABLET, DELAYED RELEASE ORAL at 08:17

## 2022-09-05 RX ADMIN — ACETAMINOPHEN 650 MG: 325 TABLET ORAL at 08:29

## 2022-09-05 RX ADMIN — METHOCARBAMOL 500 MG: 500 TABLET ORAL at 20:17

## 2022-09-05 RX ADMIN — POTASSIUM & SODIUM PHOSPHATES POWDER PACK 280-160-250 MG 2 PACKET: 280-160-250 PACK at 13:06

## 2022-09-05 RX ADMIN — LIDOCAINE 1 PATCH: 560 PATCH PERCUTANEOUS; TOPICAL; TRANSDERMAL at 13:07

## 2022-09-05 RX ADMIN — POLYETHYLENE GLYCOL 3350 17 G: 17 POWDER, FOR SOLUTION ORAL at 08:18

## 2022-09-05 RX ADMIN — TRAMADOL HYDROCHLORIDE 50 MG: 50 TABLET, COATED ORAL at 11:21

## 2022-09-05 RX ADMIN — METOPROLOL TARTRATE 6.25 MG: 25 TABLET, FILM COATED ORAL at 10:47

## 2022-09-05 ASSESSMENT — ACTIVITIES OF DAILY LIVING (ADL)
ADLS_ACUITY_SCORE: 20
ADLS_ACUITY_SCORE: 20
ADLS_ACUITY_SCORE: 24
ADLS_ACUITY_SCORE: 20
ADLS_ACUITY_SCORE: 23
ADLS_ACUITY_SCORE: 20
ADLS_ACUITY_SCORE: 23
ADLS_ACUITY_SCORE: 20
ADLS_ACUITY_SCORE: 23
ADLS_ACUITY_SCORE: 24
ADLS_ACUITY_SCORE: 23
ADLS_ACUITY_SCORE: 23

## 2022-09-05 NOTE — PROGRESS NOTES
0700 - 1130 RN Shift Summary    Patient remained stable, vitals WDL, does not require vasopressors. Got up to the bedside commode and sat in the chair for 1 hour, no dizziness or orthostatic hypotension, BP remained stable. Heart rate 100s to 110s, sinus tachycardia. Chest tubes removed by CV surgery at 0930, patient on bedrest for 1 hour after that, dressing remained clean, dry and intact. Complains of lower incisional pain that radiates to left shoulder, only minor relief with Tylenol, Tramadol ordered per CV surgery and given prior to transfer to station 33.

## 2022-09-05 NOTE — PROGRESS NOTES
[facility]  Critical Care Service  Progress Note  Date of Service (when I saw the patient): 09/05/2022    Assessment & Plan   Kalani Rowan is a 71 year old female who was admitted on 8/31/2022. She underwent 3 vessel CAB today without issue. Had an orthostatic episode yesterday and phenylephrine was restarted after initial wean off.  Neuro  - No issues since extubation    CV  1. S/p 3 vessel CAB  2. Hypotension  Plan:  -- Cardiac meds per CV surgery  - Off pressors with good bp    Resp:  Post operative ventilator management- doing well following extubation with chest tube  Plan:  - Pulmonary tileting  - Chest tube to be removed today as per ct curgery    GI/Nutrition  1. No prior hx  Plan:  -- on orals    Renal  1. No prior hx.  Baseline creatinine appears to be 0.7  Plan:  --monitor function and electrolytes as needed with replacement per ICU protocols. - generally avoid nephrotoxic agents such as NSAID, IV contrast unless specifically required  -- adjust medications as needed for renal clearance  -- follow I/O's as appropriate.  -- maintain euvolemia      Endocrine  1. Stress Hyperglycemia  Plan:  --  Insulin gtt per protocol if indicated  -- Keep BG  <180 for optimal healing    Heme:  1. Acute blood loss anemia  2. Coagulopathy   Plan:  -- Monitor hemoglobin.  -- Transfuse to keep > 7.5      General cares:  DVT Prophylaxis: Pneumatic Compression Devices  GI Prophylaxis: PPI  Restraints: Restraints for medical healing needed: YES  Family update by me today: No  Current lines are required for patient management  Access:  Demian Sierra MD  Time Spent on this Encounter   Billing:  I spent 35 minutes bedside and on the inpatient unit today managing the critical care of Kalani Rowan in relation to the issues listed in this note.  Interval History   S/p 3 vessel CAB  Physical Exam   Temp: 98.3  F (36.8  C) Temp src: Oral Temp  Min: 98.3  F (36.8  C)  Max: 100  F (37.8  C) BP: 112/64 Pulse:  96   Resp: 19 SpO2: 96 % O2 Device: None (Room air)    Vitals:    09/03/22 0830 09/04/22 0400 09/05/22 0600   Weight: 59.1 kg (130 lb 4.7 oz) 58.7 kg (129 lb 6.6 oz) 59 kg (130 lb 1.1 oz)     I/O last 3 completed shifts:  In: 828.6 [P.O.:490; I.V.:338.6]  Out: 980 [Urine:760; Chest Tube:220]    GEN: Awake  EYES: PERRL, Anicteric sclera.   HEENT:  Normocephalic, atraumatic, trachea midline  CV: RRR, no gallops, rubs, or murmurs.  PULM/CHEST: Clear breath sounds bilaterally without rhonchi. Clean post surgical wound  GI: soft, non-distended  : truong catheter in place, urine yellow and clear  EXTREMITIES: no peripheral edema, moving all extremities, peripheral pulses intact  NEURO: sedated  SKIN: No rashes, sores or ulcerations      Data   Recent Labs   Lab 09/05/22  0642 09/05/22  0556 09/04/22  2358 09/04/22  2200 09/04/22  0807 09/04/22  0411 09/04/22  0009 09/02/22  2359 09/02/22  2305 09/02/22  1441 09/02/22  1429 09/02/22  1226   WBC  --   --  9.9  --   --  12.7* 13.2*   < >  --   --  17.2* 22.3*   HGB  --   --  9.3*  --   --  8.5* 8.7*   < >  --   --  10.7* 7.9*   MCV  --   --  90  --   --  93 92   < >  --   --  90 91   PLT  --   --  140*  --   --  111* 127*   < >  --   --  191 162   INR  --   --   --   --   --   --   --   --   --   --  1.26* 1.55*   NA  --  140 141  --   --  140 141   < > 141  --  143 144   POTASSIUM  --  3.3* 3.3*  --   --  3.3* 3.3*   < > 3.8   < > 3.3* 3.9   CHLORIDE  --  109 108  --   --  109 110*   < > 111*  --  114* 115*   CO2  --   --  27  --   --  28 27   < > 23  --  23 24   BUN  --   --  16  --   --  16 16   < > 13  --  13 14   CR  --   --  0.54  --   --  0.51* 0.54   < > 0.68  --  0.59 0.51*   ANIONGAP  --   --  6  --   --  3 4   < > 7  --  6 5   APRIL  --   --  8.2*  --   --  8.3* 8.2*   < > 8.6  --  8.1* 8.2*   *  --  122* 122*   < > 131* 130*   < > 162*   < > 154* 172*   ALBUMIN  --   --   --   --   --  3.0*  --   --  3.3*  --  2.6*  --    PROTTOTAL  --   --   --   --   --   5.0*  --   --  5.3*  --  4.6*  --    BILITOTAL  --   --   --   --   --  1.1  --   --  0.7  --  0.6  --    ALKPHOS  --   --   --   --   --  38*  --   --  50  --  48  --    ALT  --   --   --   --   --  21  --   --  34  --  36  --    AST  --   --   --   --   --  22  --   --  55*  --  68*  --     < > = values in this interval not displayed.     Time Spent on this Encounter     Total Critical Care time spent by me, excluding procedures, was 40 minutes.    MD Demian Jeffries MD  Pulmonary, Critical Care and Sleep Medicine  [facility]  Pager: [phone]

## 2022-09-05 NOTE — PROGRESS NOTES
[facility]  Cardiovascular and Thoracic Surgery Daily Note      Assessment and Plan  POD # 3 s/p Coronary artery bypass grafting x 3              - Left internal mammary artery to left anterior descending artery              - Reversed saphenous vein graft to right posterior descending artery              - Reversed saphenous vein graft to obtuse marginal artery   2) Endoscopic vein harvest left and right lower extremity  3) Transesophageal echocardiogram on 22 with Dr. Berto Tsai    - CVS: Pre-op TTE with EF 45-50%, off florencia, previous orthostasis episodes, ASA, BB 6.25 mg bid as able, statin, sub q heparin.      - Resp: Extubated per protocol. IS, pulmonary toilet, sating well on RA<1L NC, wean as able    - Neuro: Neuro intact, pain controlled on current regimen    - Renal: adequate UO, at pre-op wt  Recent Labs   Lab 22  0556 22  2358 22  0411   CR 0.57 0.54 0.51*       - GI: -BM, continue bowel regimen, MOM vs suppository today    - : voiding on own    - Endo: Insulin infusion   Hemoglobin A1C   Date Value Ref Range Status   2022 6.0 (H) 0.0 - 5.6 % Final     Comment:     Normal <5.7%   Prediabetes 5.7-6.4%    Diabetes 6.5% or higher     Note: Adopted from ADA consensus guidelines.   2019 5.3 0 - 5.6 % Final     Comment:     Normal <5.7% Prediabetes 5.7-6.4%  Diabetes 6.5% or higher - adopted from ADA   consensus guidelines.          - FEN: Replace electrolytes as needed. Regular diet    - ID: Afebrile, Temp (24hrs), Av.2  F (37.3  C), Min:98.3  F (36.8  C), Max:100  F (37.8  C), WBC 9.9<12.7<16.7. Periop abx completing. Trend CBC.   Recent Labs   Lab 22  2358 22  0411 22  0009   WBC 9.9 12.7* 13.2*       - Heme: Acute blood loss anemia and thrombocytopenia due to surgery. Hgb and PLT wnl after surgery. Trend CBC, transfuse PRN.   Recent Labs   Lab 22  2358 22  0411 22  0009   HGB 9.3* 8.5* 8.7*   * 111* 127*  "      - Proph: SCD, subcutaneous heparin, PPI    - Dispo: ICU-->St [address], continue therapies       Interval History  Sitting up in chair, eating omelet, breathing stable, tolerating diet, working with rehab, off florencia      Medications    acetaminophen  975 mg Oral Q8H     aspirin  162 mg Oral or NG Tube Daily     heparin ANTICOAGULANT  5,000 Units Subcutaneous Q8H     insulin aspart  1-7 Units Subcutaneous TID AC     insulin aspart  1-5 Units Subcutaneous At Bedtime     lactated ringers  250 mL Intravenous Once     Lidocaine  1 patch Transdermal Q24H     lidocaine   Transdermal Q8H DEVON     mupirocin  0.5 g Both Nostrils BID     pantoprazole  40 mg Oral or NG Tube Daily    Or     pantoprazole  40 mg Oral Daily     polyethylene glycol  17 g Oral Daily     senna-docusate  1 tablet Oral BID     sodium chloride (PF)  3 mL Intracatheter Q8H     acetaminophen, bisacodyl, calcium gluconate, calcium gluconate, calcium gluconate, dextrose, glucose **OR** dextrose **OR** glucagon, EPINEPHrine, hydrALAZINE, HYDROmorphone **OR** HYDROmorphone, lactated ringers, lidocaine 4%, lidocaine (buffered or not buffered), magnesium hydroxide, methocarbamol, naloxone **OR** naloxone **OR** naloxone **OR** naloxone, nitroGLYcerin, ondansetron **OR** ondansetron, oxyCODONE **OR** oxyCODONE, phenylephrine, prochlorperazine **OR** prochlorperazine, BETA BLOCKER NOT PRESCRIBED, sodium chloride (PF)      Physical Exam  Vitals were reviewed  Blood pressure 112/64, pulse 96, temperature 98.3  F (36.8  C), temperature source Oral, resp. rate 19, height 1.5 m (4' 11.06\"), weight 59 kg (130 lb 1.1 oz), SpO2 96 %, not currently breastfeeding.  Rhythm: s.tach    Lungs: diminished bases    Cardiovascular: rrr, no m/r/g    Abdomen: soft, NT, ND, +BS    Extremeties: warm, no LE edema    Incision: CDI    CT: 220<350<537 serosang output, no air leak    Weight:   Vitals:    09/01/22 0417 09/03/22 0830 09/04/22 0400 09/05/22 0600   Weight: 58.2 kg (128 lb 6.4 oz) " 59.1 kg (130 lb 4.7 oz) 58.7 kg (129 lb 6.6 oz) 59 kg (130 lb 1.1 oz)         Data  [unfilled]    Imaging:  No results found for this or any previous visit (from the past 24 hour(s)).      Patient discussed with Dr. Quin Mike, PASánchezC  Cardiothoracic Surgery  Pager [phone]

## 2022-09-05 NOTE — PLAN OF CARE
Goal Outcome Evaluation:    Pressors off at 0530. Pain controlled well.   Slept well overnight between cares.

## 2022-09-05 NOTE — PLAN OF CARE
DATE & TIME: 9/5/2022 transferred from ICU  Cognitive Concerns/ Orientation : A&O x4. Former RN   BEHAVIOR & AGGRESSION TOOL COLOR: green   CIWA SCORE: na    ABNL VS/O2: HR low 100s. Started on metoprolol 6.25 mg BID today. Lungs clear.   MOBILITY: Ax1 with GB  PAIN MANAGMENT: incisional pain. PRN ultram once prior to transferring out of ICU  DIET: regular.   BOWEL/BLADDER: continent of B&B. +BS. No BM since surgery. MOM given this am. Can try supp. In am if still no BM.   ABNL LAB/BG: K+ 4.0 after am replacement. Phos 1.2, replaing, recheck in am.   DRAIN/DEVICES: PIV SL.   TELEMETRY RHYTHM: ST  SKIN: CT and wire removed at 0930 today. Sternal incision, graft sites CDI, open to air, ecchymosis.   TESTS/PROCEDURES: na   D/C DATE: pending.   Discharge Barriers:na   OTHER IMPORTANT INFO: following sternal precaution well.

## 2022-09-05 NOTE — PROGRESS NOTES
"NUTRITION ASSESSMENT      REASON FOR ASSESSMENT:  Cardiac Surgery Nutrition Consult    CURRENT DIET / INTAKE:  Regular    Patient has been eating 100% post op per chart review. Visited patient at bedside this morning who states her appetite is good, she has been ordering 3 meals/day. Had an omelet for breakfast this morning & trying to get some kind of protein at all meals.     ANTHROPOMETRICS:   Ht: 4' 11\"  Wt: 58.2 kg (128 lb) - 9/1  BMI: 25.9 kg/m^2  IBW: 44.3 kg  Weight Status: Overweight BMI 25-29.9  %IBW: 131%    MALNUTRITION:  Patient does not meet two of the following criteria necessary for diagnosing malnutrition:  significant weight loss, reduced intake, subcutaneous fat loss, muscle loss or fluid retention. Nutrition Focused Physical Assessment (NFPA) not appropriate at this time.    NUTRITION DIAGNOSIS:   No nutrition diagnosis identified at this time    INTERVENTIONS:    Nutrition Prescription:  Regular Diet  Meals TID with source of protein on each     Implementation:  Nutrition Education (Content):  Discussed the importance of adequate nutrition post-op for healing and energy, emphasizing protein foods, and encouraged patient to order a protein food at each meal.    Goals:  Patient to consume ~75% at meals in the next 3 - 5 days    Follow Up/Monitoring (InPatient):  Food and Fluid intake -  Monitor for adequacy    Follow Up/Monitoring (OutPatient):  Patient will participate in out-patient cardiac rehab and attend nutrition classes during the program    Cristela Henriquez RD, ELIDA  Weekend/Holiday Pager: [phone]            "

## 2022-09-06 ENCOUNTER — APPOINTMENT (OUTPATIENT)
Dept: PHYSICAL THERAPY | Facility: CLINIC | Age: 72
DRG: 234 | End: 2022-09-06
Payer: COMMERCIAL

## 2022-09-06 ENCOUNTER — APPOINTMENT (OUTPATIENT)
Dept: GENERAL RADIOLOGY | Facility: CLINIC | Age: 72
DRG: 234 | End: 2022-09-06
Attending: PHYSICIAN ASSISTANT
Payer: COMMERCIAL

## 2022-09-06 PROBLEM — Z95.1 S/P CABG (CORONARY ARTERY BYPASS GRAFT): Status: ACTIVE | Noted: 2022-09-06

## 2022-09-06 LAB
ANION GAP SERPL CALCULATED.3IONS-SCNC: 5 MMOL/L (ref 3–14)
BUN SERPL-MCNC: 13 MG/DL (ref 7–30)
CA-I BLD-MCNC: 4.7 MG/DL (ref 4.4–5.2)
CALCIUM SERPL-MCNC: 8.2 MG/DL (ref 8.5–10.1)
CHLORIDE BLD-SCNC: 108 MMOL/L (ref 94–109)
CO2 SERPL-SCNC: 27 MMOL/L (ref 20–32)
CORTIS SERPL-MCNC: 6.6 UG/DL
CREAT SERPL-MCNC: 0.51 MG/DL (ref 0.52–1.04)
ERYTHROCYTE [DISTWIDTH] IN BLOOD BY AUTOMATED COUNT: 13.3 % (ref 10–15)
GFR SERPL CREATININE-BSD FRML MDRD: >90 ML/MIN/1.73M2
GLUCOSE BLD-MCNC: 118 MG/DL (ref 70–99)
GLUCOSE BLDC GLUCOMTR-MCNC: 116 MG/DL (ref 70–99)
GLUCOSE BLDC GLUCOMTR-MCNC: 130 MG/DL (ref 70–99)
GLUCOSE BLDC GLUCOMTR-MCNC: 170 MG/DL (ref 70–99)
GLUCOSE BLDC GLUCOMTR-MCNC: 174 MG/DL (ref 70–99)
HCT VFR BLD AUTO: 29.3 % (ref 35–47)
HGB BLD-MCNC: 9.5 G/DL (ref 11.7–15.7)
MAGNESIUM SERPL-MCNC: 2 MG/DL (ref 1.6–2.3)
MCH RBC QN AUTO: 29.8 PG (ref 26.5–33)
MCHC RBC AUTO-ENTMCNC: 32.4 G/DL (ref 31.5–36.5)
MCV RBC AUTO: 92 FL (ref 78–100)
PHOSPHATE SERPL-MCNC: 2.9 MG/DL (ref 2.5–4.5)
PLATELET # BLD AUTO: 164 10E3/UL (ref 150–450)
PLATELET # BLD AUTO: 168 10E3/UL (ref 150–450)
POTASSIUM BLD-SCNC: 3.9 MMOL/L (ref 3.4–5.3)
POTASSIUM BLD-SCNC: 4.1 MMOL/L (ref 3.4–5.3)
RBC # BLD AUTO: 3.19 10E6/UL (ref 3.8–5.2)
SODIUM SERPL-SCNC: 140 MMOL/L (ref 133–144)
WBC # BLD AUTO: 9.4 10E3/UL (ref 4–11)

## 2022-09-06 PROCEDURE — 120N000001 HC R&B MED SURG/OB

## 2022-09-06 PROCEDURE — 250N000011 HC RX IP 250 OP 636: Performed by: PHYSICIAN ASSISTANT

## 2022-09-06 PROCEDURE — 97110 THERAPEUTIC EXERCISES: CPT | Mod: GP

## 2022-09-06 PROCEDURE — 999N000065 XR CHEST 2 VIEWS

## 2022-09-06 PROCEDURE — 97530 THERAPEUTIC ACTIVITIES: CPT | Mod: GP

## 2022-09-06 PROCEDURE — 84132 ASSAY OF SERUM POTASSIUM: CPT | Performed by: INTERNAL MEDICINE

## 2022-09-06 PROCEDURE — P9045 ALBUMIN (HUMAN), 5%, 250 ML: HCPCS | Performed by: PHYSICIAN ASSISTANT

## 2022-09-06 PROCEDURE — 84100 ASSAY OF PHOSPHORUS: CPT | Performed by: INTERNAL MEDICINE

## 2022-09-06 PROCEDURE — 82330 ASSAY OF CALCIUM: CPT | Performed by: PHYSICIAN ASSISTANT

## 2022-09-06 PROCEDURE — 250N000013 HC RX MED GY IP 250 OP 250 PS 637: Performed by: INTERNAL MEDICINE

## 2022-09-06 PROCEDURE — 83735 ASSAY OF MAGNESIUM: CPT | Performed by: INTERNAL MEDICINE

## 2022-09-06 PROCEDURE — 36415 COLL VENOUS BLD VENIPUNCTURE: CPT | Performed by: PHYSICIAN ASSISTANT

## 2022-09-06 PROCEDURE — 82533 TOTAL CORTISOL: CPT | Performed by: PHYSICIAN ASSISTANT

## 2022-09-06 PROCEDURE — 250N000013 HC RX MED GY IP 250 OP 250 PS 637: Performed by: PHYSICIAN ASSISTANT

## 2022-09-06 PROCEDURE — 85027 COMPLETE CBC AUTOMATED: CPT | Performed by: PHYSICIAN ASSISTANT

## 2022-09-06 PROCEDURE — 85049 AUTOMATED PLATELET COUNT: CPT | Performed by: PHYSICIAN ASSISTANT

## 2022-09-06 PROCEDURE — 82310 ASSAY OF CALCIUM: CPT | Performed by: PHYSICIAN ASSISTANT

## 2022-09-06 RX ORDER — MAGNESIUM OXIDE 400 MG/1
400 TABLET ORAL EVERY 4 HOURS
Status: COMPLETED | OUTPATIENT
Start: 2022-09-06 | End: 2022-09-07

## 2022-09-06 RX ADMIN — LIDOCAINE 1 PATCH: 560 PATCH PERCUTANEOUS; TOPICAL; TRANSDERMAL at 18:58

## 2022-09-06 RX ADMIN — Medication 400 MG: at 23:25

## 2022-09-06 RX ADMIN — Medication 40 MG: at 09:51

## 2022-09-06 RX ADMIN — ASPIRIN 81 MG CHEWABLE TABLET 162 MG: 81 TABLET CHEWABLE at 09:30

## 2022-09-06 RX ADMIN — TRAMADOL HYDROCHLORIDE 50 MG: 50 TABLET, COATED ORAL at 00:21

## 2022-09-06 RX ADMIN — TRAMADOL HYDROCHLORIDE 50 MG: 50 TABLET, COATED ORAL at 06:42

## 2022-09-06 RX ADMIN — HEPARIN SODIUM 5000 UNITS: 5000 INJECTION, SOLUTION INTRAVENOUS; SUBCUTANEOUS at 14:21

## 2022-09-06 RX ADMIN — METOPROLOL TARTRATE 6.25 MG: 25 TABLET, FILM COATED ORAL at 09:29

## 2022-09-06 RX ADMIN — MUPIROCIN 0.5 G: 20 OINTMENT TOPICAL at 20:34

## 2022-09-06 RX ADMIN — HEPARIN SODIUM 5000 UNITS: 5000 INJECTION, SOLUTION INTRAVENOUS; SUBCUTANEOUS at 06:08

## 2022-09-06 RX ADMIN — METHOCARBAMOL 500 MG: 500 TABLET ORAL at 20:32

## 2022-09-06 RX ADMIN — METOPROLOL TARTRATE 6.25 MG: 25 TABLET, FILM COATED ORAL at 20:32

## 2022-09-06 RX ADMIN — HEPARIN SODIUM 5000 UNITS: 5000 INJECTION, SOLUTION INTRAVENOUS; SUBCUTANEOUS at 21:30

## 2022-09-06 RX ADMIN — ALBUMIN HUMAN 250 ML: 0.05 INJECTION, SOLUTION INTRAVENOUS at 09:37

## 2022-09-06 RX ADMIN — MUPIROCIN 0.5 G: 20 OINTMENT TOPICAL at 09:37

## 2022-09-06 RX ADMIN — SENNOSIDES AND DOCUSATE SODIUM 1 TABLET: 50; 8.6 TABLET ORAL at 20:32

## 2022-09-06 RX ADMIN — ACETAMINOPHEN 650 MG: 325 TABLET ORAL at 13:03

## 2022-09-06 ASSESSMENT — ACTIVITIES OF DAILY LIVING (ADL)
ADLS_ACUITY_SCORE: 22
ADLS_ACUITY_SCORE: 24
ADLS_ACUITY_SCORE: 22
ADLS_ACUITY_SCORE: 24
ADLS_ACUITY_SCORE: 24

## 2022-09-06 NOTE — PLAN OF CARE
Pt is A&Ox4, VSS ex HR tachy at times (low 100s to 1 teens), on RA. Sternal incision closed with liquid bandage, GENE/CDI. [place] sites to BLE, closed with liquid bandage, GENE/CDI. Dsg to old chest tube site with scant amount of dried serosanguinous drainage. LS clear, dim in bases, IS use encouraged. BS+, had BM yesterday, voiding well. Phos replaced orally last night, re-check in the AM. PRN roabxian and tramadol for pain management. Pt is up SBA, on regular diet-denies N/V. Tele: Sinus tachycardia.

## 2022-09-06 NOTE — PROGRESS NOTES
[facility]  Cardiovascular and Thoracic Surgery Daily Note      Assessment and Plan  POD # 4 s/p Coronary artery bypass grafting x 3              - Left internal mammary artery to left anterior descending artery              - Reversed saphenous vein graft to right posterior descending artery              - Reversed saphenous vein graft to obtuse marginal artery   2) Endoscopic vein harvest left and right lower extremity  3) Transesophageal echocardiogram on 22 with Dr. Berto Tsai    - CVS: Pre-op TTE with EF 45-50%, having orthostasis episodes, ASA, BB 6.25 mg bid as able, statin, sub q heparin. Had episode of orthostasis this am, 250 ml albumin    - Resp: Extubated per protocol. IS, pulmonary toilet, sating well on RA<1L NC, wean as able    - Neuro: Neuro intact, pain controlled on current regimen    - Renal: adequate UO, at pre-op wt  Recent Labs   Lab 22  0013 22  0556 22  2358   CR 0.51* 0.57 0.54       - GI: +BM, continue bowel regimen    - : voiding on own    - Endo: Insulin infusion   Hemoglobin A1C   Date Value Ref Range Status   2022 6.0 (H) 0.0 - 5.6 % Final     Comment:     Normal <5.7%   Prediabetes 5.7-6.4%    Diabetes 6.5% or higher     Note: Adopted from ADA consensus guidelines.   2019 5.3 0 - 5.6 % Final     Comment:     Normal <5.7% Prediabetes 5.7-6.4%  Diabetes 6.5% or higher - adopted from ADA   consensus guidelines.          - FEN: Replace electrolytes as needed. Regular diet    - ID: Afebrile, Temp (24hrs), Av.2  F (37.3  C), Min:98.3  F (36.8  C), Max:100  F (37.8  C), WBC 9.9<12.7<16.7. Periop abx completing. Trend CBC.   Recent Labs   Lab 22  0013 22  2358 22  0411   WBC 9.4 9.9 12.7*       - Heme: Acute blood loss anemia and thrombocytopenia due to surgery. Hgb and PLT wnl after surgery. Trend CBC, transfuse PRN.   Recent Labs   Lab 22  0013 22  2358 22  0411   HGB 9.5* 9.3* 8.5*     "140* 111*       - Proph: SCD, subcutaneous heparin, PPI    - Dispo: St [address], continue therapies       Interval History  Lying flat in bed, feeling lightheaded/dizzy, breathing stable, tolerating diet, working with rehab, will give albumin this am.  inquires about adrenal insufficiency test as pt on steroid 2 weeks before surgery due to covid-will discuss with surgeon.       Medications    aspirin  162 mg Oral or NG Tube Daily     heparin ANTICOAGULANT  5,000 Units Subcutaneous Q8H     insulin aspart  1-7 Units Subcutaneous TID AC     insulin aspart  1-5 Units Subcutaneous At Bedtime     lactated ringers  250 mL Intravenous Once     Lidocaine  1 patch Transdermal Q24H     lidocaine   Transdermal Q8H DEVON     magnesium hydroxide  30 mL Oral Daily     metoprolol succinate ER  12.5 mg Oral Daily     mupirocin  0.5 g Both Nostrils BID     pantoprazole  40 mg Oral or NG Tube Daily    Or     pantoprazole  40 mg Oral Daily     polyethylene glycol  17 g Oral Daily     senna-docusate  1 tablet Oral BID     sodium chloride (PF)  3 mL Intracatheter Q8H     acetaminophen, bisacodyl, calcium gluconate, calcium gluconate, calcium gluconate, dextrose, glucose **OR** dextrose **OR** glucagon, hydrALAZINE, lactated ringers, lidocaine 4%, lidocaine (buffered or not buffered), magnesium hydroxide, methocarbamol, naloxone **OR** naloxone **OR** naloxone **OR** naloxone, ondansetron **OR** ondansetron, oxyCODONE **OR** oxyCODONE, prochlorperazine **OR** prochlorperazine, BETA BLOCKER NOT PRESCRIBED, sodium chloride (PF), traMADol      Physical Exam  Vitals were reviewed  Blood pressure 102/53, pulse 105, temperature 98.6  F (37  C), temperature source Oral, resp. rate 20, height 1.499 m (4' 11\"), weight 61.1 kg (134 lb 11.2 oz), SpO2 93 %, not currently breastfeeding.  Rhythm: s.tach    Lungs: diminished bases    Cardiovascular: rrr, no m/r/g    Abdomen: soft, NT, ND, +BS    Extremeties: warm, no LE edema    Incision: CDI    CT: " na    Weight:   Vitals:    09/01/22 0417 09/03/22 0830 09/04/22 0400 09/05/22 0600   Weight: 58.2 kg (128 lb 6.4 oz) 59.1 kg (130 lb 4.7 oz) 58.7 kg (129 lb 6.6 oz) 59 kg (130 lb 1.1 oz)    09/05/22 1148   Weight: 61.1 kg (134 lb 11.2 oz)         Data  [unfilled]    Imaging:  No results found for this or any previous visit (from the past 24 hour(s)).      Patient discussed with Dr. Nacho Mike, PA-C  Cardiothoracic Surgery  Pager [phone]

## 2022-09-06 NOTE — PLAN OF CARE
Patient is A&Ox 4. Vitals are stable on room air. Tele sinus tachycardia. Abnormal/trending labs this shift were BP decrease with activity around 0800. Administered albumin, and BP stability trending up. Abnormal assessment points included glucose level of 176 with evening meal. Insulin pen ordered, not yet given. Patient is up with assist of 1, ambulated twice during shift, and is comfortable sitting upright in chair. Sleep Quality good. Plan is to continue monitoring and provide patient with daily shower and pain management. Patient refused shower today, but agreed to shower tomorrow. Patient and  are very curious about cortisol lab results. Informed them that results should be available tomorrow.       Goal Outcome Evaluation:    Plan of Care Reviewed With: patient, spouse     Overall Patient Progress: improving

## 2022-09-07 ENCOUNTER — APPOINTMENT (OUTPATIENT)
Dept: PHYSICAL THERAPY | Facility: CLINIC | Age: 72
DRG: 234 | End: 2022-09-07
Payer: COMMERCIAL

## 2022-09-07 DIAGNOSIS — Z95.1 S/P CABG (CORONARY ARTERY BYPASS GRAFT): Primary | ICD-10-CM

## 2022-09-07 LAB
ANION GAP SERPL CALCULATED.3IONS-SCNC: 7 MMOL/L (ref 3–14)
ATRIAL RATE - MUSE: 79 BPM
BUN SERPL-MCNC: 10 MG/DL (ref 7–30)
CA-I BLD-MCNC: 4.6 MG/DL (ref 4.4–5.2)
CALCIUM SERPL-MCNC: 8.4 MG/DL (ref 8.5–10.1)
CHLORIDE BLD-SCNC: 107 MMOL/L (ref 94–109)
CO2 SERPL-SCNC: 28 MMOL/L (ref 20–32)
CREAT SERPL-MCNC: 0.6 MG/DL (ref 0.52–1.04)
DIASTOLIC BLOOD PRESSURE - MUSE: NORMAL MMHG
GFR SERPL CREATININE-BSD FRML MDRD: >90 ML/MIN/1.73M2
GLUCOSE BLD-MCNC: 132 MG/DL (ref 70–99)
GLUCOSE BLDC GLUCOMTR-MCNC: 124 MG/DL (ref 70–99)
GLUCOSE BLDC GLUCOMTR-MCNC: 128 MG/DL (ref 70–99)
INTERPRETATION ECG - MUSE: NORMAL
MAGNESIUM SERPL-MCNC: 2.2 MG/DL (ref 1.6–2.3)
P AXIS - MUSE: 64 DEGREES
PHOSPHATE SERPL-MCNC: 3.3 MG/DL (ref 2.5–4.5)
POTASSIUM BLD-SCNC: 4 MMOL/L (ref 3.4–5.3)
POTASSIUM BLD-SCNC: 4.1 MMOL/L (ref 3.4–5.3)
PR INTERVAL - MUSE: 150 MS
QRS DURATION - MUSE: 68 MS
QT - MUSE: 334 MS
QTC - MUSE: 382 MS
R AXIS - MUSE: -12 DEGREES
SODIUM SERPL-SCNC: 142 MMOL/L (ref 133–144)
SYSTOLIC BLOOD PRESSURE - MUSE: NORMAL MMHG
T AXIS - MUSE: 4 DEGREES
VENTRICULAR RATE- MUSE: 79 BPM

## 2022-09-07 PROCEDURE — 250N000013 HC RX MED GY IP 250 OP 250 PS 637: Performed by: INTERNAL MEDICINE

## 2022-09-07 PROCEDURE — 82310 ASSAY OF CALCIUM: CPT | Performed by: PHYSICIAN ASSISTANT

## 2022-09-07 PROCEDURE — 83735 ASSAY OF MAGNESIUM: CPT | Performed by: INTERNAL MEDICINE

## 2022-09-07 PROCEDURE — 120N000001 HC R&B MED SURG/OB

## 2022-09-07 PROCEDURE — 250N000013 HC RX MED GY IP 250 OP 250 PS 637: Performed by: PHYSICIAN ASSISTANT

## 2022-09-07 PROCEDURE — 84100 ASSAY OF PHOSPHORUS: CPT | Performed by: INTERNAL MEDICINE

## 2022-09-07 PROCEDURE — 36415 COLL VENOUS BLD VENIPUNCTURE: CPT | Performed by: PHYSICIAN ASSISTANT

## 2022-09-07 PROCEDURE — 82330 ASSAY OF CALCIUM: CPT | Performed by: PHYSICIAN ASSISTANT

## 2022-09-07 PROCEDURE — 97530 THERAPEUTIC ACTIVITIES: CPT | Mod: GP

## 2022-09-07 PROCEDURE — 250N000011 HC RX IP 250 OP 636: Performed by: PHYSICIAN ASSISTANT

## 2022-09-07 PROCEDURE — 97110 THERAPEUTIC EXERCISES: CPT | Mod: GP

## 2022-09-07 PROCEDURE — 84132 ASSAY OF SERUM POTASSIUM: CPT | Performed by: INTERNAL MEDICINE

## 2022-09-07 RX ORDER — ATORVASTATIN CALCIUM 10 MG/1
20 TABLET, FILM COATED ORAL EVERY EVENING
Status: DISCONTINUED | OUTPATIENT
Start: 2022-09-07 | End: 2022-09-09 | Stop reason: HOSPADM

## 2022-09-07 RX ORDER — ACETAMINOPHEN 325 MG/1
975 TABLET ORAL EVERY 8 HOURS
Status: DISCONTINUED | OUTPATIENT
Start: 2022-09-07 | End: 2022-09-09 | Stop reason: HOSPADM

## 2022-09-07 RX ADMIN — HEPARIN SODIUM 5000 UNITS: 5000 INJECTION, SOLUTION INTRAVENOUS; SUBCUTANEOUS at 21:00

## 2022-09-07 RX ADMIN — POLYETHYLENE GLYCOL 3350 17 G: 17 POWDER, FOR SOLUTION ORAL at 08:24

## 2022-09-07 RX ADMIN — ATORVASTATIN CALCIUM 20 MG: 10 TABLET, FILM COATED ORAL at 20:59

## 2022-09-07 RX ADMIN — METHOCARBAMOL 500 MG: 500 TABLET ORAL at 08:37

## 2022-09-07 RX ADMIN — Medication 400 MG: at 03:53

## 2022-09-07 RX ADMIN — ACETAMINOPHEN 975 MG: 325 TABLET ORAL at 14:32

## 2022-09-07 RX ADMIN — METHOCARBAMOL 500 MG: 500 TABLET ORAL at 23:59

## 2022-09-07 RX ADMIN — ACETAMINOPHEN 975 MG: 325 TABLET ORAL at 21:00

## 2022-09-07 RX ADMIN — HEPARIN SODIUM 5000 UNITS: 5000 INJECTION, SOLUTION INTRAVENOUS; SUBCUTANEOUS at 05:56

## 2022-09-07 RX ADMIN — SENNOSIDES AND DOCUSATE SODIUM 1 TABLET: 50; 8.6 TABLET ORAL at 20:59

## 2022-09-07 RX ADMIN — HEPARIN SODIUM 5000 UNITS: 5000 INJECTION, SOLUTION INTRAVENOUS; SUBCUTANEOUS at 14:31

## 2022-09-07 RX ADMIN — LIDOCAINE 1 PATCH: 560 PATCH PERCUTANEOUS; TOPICAL; TRANSDERMAL at 20:59

## 2022-09-07 RX ADMIN — SENNOSIDES AND DOCUSATE SODIUM 1 TABLET: 50; 8.6 TABLET ORAL at 08:24

## 2022-09-07 RX ADMIN — MUPIROCIN 0.5 G: 20 OINTMENT TOPICAL at 08:25

## 2022-09-07 RX ADMIN — ASPIRIN 81 MG CHEWABLE TABLET 162 MG: 81 TABLET CHEWABLE at 08:24

## 2022-09-07 RX ADMIN — PANTOPRAZOLE SODIUM 40 MG: 40 TABLET, DELAYED RELEASE ORAL at 08:24

## 2022-09-07 ASSESSMENT — ACTIVITIES OF DAILY LIVING (ADL)
ADLS_ACUITY_SCORE: 22
ADLS_ACUITY_SCORE: 23

## 2022-09-07 NOTE — PLAN OF CARE
Goal Outcome Evaluation: Improving     Minimal pain, relieved with Robaxin x1. Tele=ST. Up w/ SBA and GB to bathroom. Had BM last evening. VSS. Needs to shower this am. Mag replaced overnight, recheck 2.2, recheck ordered for tomorrow am.

## 2022-09-07 NOTE — PROGRESS NOTES
[facility]  Cardiovascular and Thoracic Surgery Daily Note      Assessment and Plan  POD # 5 s/p s/p Coronary artery bypass grafting x 3 (left internal mammary artery to left anterior descending artery, reversed saphenous vein graft to right posterior descending artery, reversed saphenous vein graft to obtuse marginal artery on 09/02 with Dr. Berto Tsai    - CVS: Pre-op TTE with LVEF 45-50%. Ongoing symptomatic orthostatic hypotension, low dose Lopressor 6.25 mg BID on hold. Appears near euvolemic, weight slightly above admission weight, but oxygenating well on RA, defer diuresis given orthostatic hypotension. Received IVF 09/06. Aspirin 162 mg daily, atorvastatin 20 mg daily. Chest tubes: removed TPW: removed    - Resp: Extubated POD0. IS, pulmonary toilet. Recent COVID PNA, PTA course of prednisone stopped.     - Neuro: Neuro intact, pain controlled on current regimen    - Renal: No history significant renal disease. Cr stable, WNL. Trend BMP. Diuretic as above.  Recent Labs   Lab 09/06/22  0013 09/05/22  0556 09/04/22  2358   CR 0.51* 0.57 0.54       - GI: +BM, continue bowel regimen    - : Gandhi removed, voiding without issue    - Endo: Pre diabetes, insulin infusion transtitoned to sliding scale insulin, now discontinued  Hemoglobin A1C   Date Value Ref Range Status   09/02/2022 6.0 (H) 0.0 - 5.6 % Final     Comment:     Normal <5.7%   Prediabetes 5.7-6.4%    Diabetes 6.5% or higher     Note: Adopted from ADA consensus guidelines.   11/02/2019 5.3 0 - 5.6 % Final     Comment:     Normal <5.7% Prediabetes 5.7-6.4%  Diabetes 6.5% or higher - adopted from ADA   consensus guidelines.          - FEN: Replace electrolytes as needed. Regular diet    - ID: Postop leukocytosis, resolved. Afebrile.  WBC WNL. Periop abx complete. Trend CBC.   Recent Labs   Lab 09/06/22  0013 09/04/22  2358 09/04/22  0411   WBC 9.4 9.9 12.7*       - Heme: Acute blood loss anemia and thrombocytopenia due to surgery.  "Hgb and PLT stable. Trend CBC, transfuse PRN.   Recent Labs   Lab 09/06/22  0717 09/06/22  0013 09/04/22  2358 09/04/22  0411   HGB  --  9.5* 9.3* 8.5*    164 140* 111*       - Proph: SCD, subcutaneous heparin, PPI    - Dispo: Possible discharge today pending resolution of orthostatic hypotension.      Interval History  Tired this morning. BP stable. Did not feel lightheaded when up to chair today, but has not worked with therapy yet.       Medications    aspirin  162 mg Oral or NG Tube Daily     heparin ANTICOAGULANT  5,000 Units Subcutaneous Q8H     insulin aspart  1-7 Units Subcutaneous TID AC     insulin aspart  1-5 Units Subcutaneous At Bedtime     lactated ringers  250 mL Intravenous Once     Lidocaine  1 patch Transdermal Q24H     lidocaine   Transdermal Q8H DEVON     magnesium hydroxide  30 mL Oral Daily     metoprolol tartrate  6.25 mg Oral BID     mupirocin  0.5 g Both Nostrils BID     pantoprazole  40 mg Oral or NG Tube Daily    Or     pantoprazole  40 mg Oral Daily     polyethylene glycol  17 g Oral Daily     senna-docusate  1 tablet Oral BID     sodium chloride (PF)  3 mL Intracatheter Q8H     acetaminophen, bisacodyl, calcium gluconate, calcium gluconate, calcium gluconate, glucose **OR** dextrose **OR** glucagon, hydrALAZINE, lactated ringers, lidocaine 4%, lidocaine (buffered or not buffered), magnesium hydroxide, methocarbamol, naloxone **OR** naloxone **OR** naloxone **OR** naloxone, ondansetron **OR** ondansetron, oxyCODONE **OR** oxyCODONE, prochlorperazine **OR** prochlorperazine, BETA BLOCKER NOT PRESCRIBED, sodium chloride (PF), traMADol      Physical Exam  Vitals were reviewed  Blood pressure 116/70, pulse 90, temperature 97.5  F (36.4  C), temperature source Axillary, resp. rate 16, height 1.499 m (4' 11\"), weight 61.2 kg (135 lb), SpO2 93 %, not currently breastfeeding.  Rhythm: NSR    Lungs: CTA    Cardiovascular: rrr, no m/r/g    Abdomen: soft, NT, ND, +BS    Extremeties: warm, no LE " edema    Incision: CDI    CT: removed    Weight:   Vitals:    09/03/22 0830 09/04/22 0400 09/05/22 0600 09/05/22 1148   Weight: 59.1 kg (130 lb 4.7 oz) 58.7 kg (129 lb 6.6 oz) 59 kg (130 lb 1.1 oz) 61.1 kg (134 lb 11.2 oz)    09/07/22 0515   Weight: 61.2 kg (135 lb)         Data  BMP  Recent Labs   Lab 09/07/22  0507 09/06/22  2116 09/06/22  1717 09/06/22  1424 09/06/22  0756 09/06/22  0717 09/06/22  0013 09/05/22  1158 09/05/22  1047 09/05/22  0642 09/05/22  0556 09/04/22  2358 09/04/22  0807 09/04/22  0411   NA  --   --   --   --   --   --  140  --   --   --  140 141  --  140   POTASSIUM 4.0  --   --   --   --  3.9 4.1  --  4.0  --  3.3* 3.3*  --  3.3*   CHLORIDE  --   --   --   --   --   --  108  --   --   --  109 108  --  109   APRIL  --   --   --   --   --   --  8.2*  --   --   --  8.5 8.2*  --  8.3*   CO2  --   --   --   --   --   --  27  --   --   --  28 27  --  28   BUN  --   --   --   --   --   --  13  --   --   --  14 16  --  16   CR  --   --   --   --   --   --  0.51*  --   --   --  0.57 0.54  --  0.51*   GLC  --  170* 174* 130* 116*  --  118*   < >  --    < > 110* 122*   < > 131*    < > = values in this interval not displayed.     CBC  Recent Labs   Lab 09/06/22  0717 09/06/22  0013 09/04/22  2358 09/04/22  0411 09/04/22  0009   WBC  --  9.4 9.9 12.7* 13.2*   RBC  --  3.19* 3.15* 2.86* 2.88*   HGB  --  9.5* 9.3* 8.5* 8.7*   HCT  --  29.3* 28.2* 26.6* 26.4*   MCV  --  92 90 93 92   MCH  --  29.8 29.5 29.7 30.2   MCHC  --  32.4 33.0 32.0 33.0   RDW  --  13.3 13.2 13.5 13.8    164 140* 111* 127*     INR  Recent Labs   Lab 09/02/22  1429 09/02/22  1226   INR 1.26* 1.55*      Hepatic Panel  Recent Labs   Lab 09/04/22  0411 09/02/22  2305 09/02/22  1429 08/31/22  2102   AST 22 55* 68* 13   ALT 21 34 36 36   ALKPHOS 38* 50 48 92   BILITOTAL 1.1 0.7 0.6 0.2   ALBUMIN 3.0* 3.3* 2.6* 3.1*       Imaging:  Recent Results (from the past 24 hour(s))   XR Chest 2 Views    Narrative    XR CHEST 2 VIEWS 9/6/2022  12:32 PM    HISTORY: s/p cabg, chest tubes removed    COMPARISON: 9/3/2022      Impression    IMPRESSION: Right IJ [place]adeen catheter, mediastinal drains and left  chest tube has been removed. Postoperative changes of CABG. Small  bilateral pleural effusions, left greater than right and mild  bibasilar atelectasis. No pneumothorax. Median sternotomy and  mediastinal surgical clips.    HOWIE PINEDA MD         SYSTEM ID:  [id number]         Patient seen and discussed with Dr. Oh Flower PA-C  Cardiothoracic Surgery  Pager [phone]

## 2022-09-07 NOTE — PLAN OF CARE
Goal Outcome Evaluation:    Plan of Care Reviewed With: patient     Overall Patient Progress: improving     A&O x4, VSS, Pain controlled with angélica Tylenol, and prn Robaxin x1. Sternal, bilateral harvest, and Old CT sites WDL. LS: clear. BS: audible. Lytes in range, recheck in AM. +void, +bm. Up SBA. Watched Heart video education with . Showerd this evening.

## 2022-09-08 ENCOUNTER — APPOINTMENT (OUTPATIENT)
Dept: PHYSICAL THERAPY | Facility: CLINIC | Age: 72
DRG: 234 | End: 2022-09-08
Payer: COMMERCIAL

## 2022-09-08 LAB
ANION GAP SERPL CALCULATED.3IONS-SCNC: 7 MMOL/L (ref 3–14)
BUN SERPL-MCNC: 12 MG/DL (ref 7–30)
CA-I BLD-MCNC: 4.7 MG/DL (ref 4.4–5.2)
CALCIUM SERPL-MCNC: 8.6 MG/DL (ref 8.5–10.1)
CHLORIDE BLD-SCNC: 109 MMOL/L (ref 94–109)
CO2 SERPL-SCNC: 24 MMOL/L (ref 20–32)
CREAT SERPL-MCNC: 0.53 MG/DL (ref 0.52–1.04)
ERYTHROCYTE [DISTWIDTH] IN BLOOD BY AUTOMATED COUNT: 14.4 % (ref 10–15)
GFR SERPL CREATININE-BSD FRML MDRD: >90 ML/MIN/1.73M2
GLUCOSE BLD-MCNC: 125 MG/DL (ref 70–99)
GLUCOSE BLDC GLUCOMTR-MCNC: 100 MG/DL (ref 70–99)
GLUCOSE BLDC GLUCOMTR-MCNC: 108 MG/DL (ref 70–99)
HCT VFR BLD AUTO: 28.9 % (ref 35–47)
HGB BLD-MCNC: 9.3 G/DL (ref 11.7–15.7)
MAGNESIUM SERPL-MCNC: 2.3 MG/DL (ref 1.6–2.3)
MCH RBC QN AUTO: 29.7 PG (ref 26.5–33)
MCHC RBC AUTO-ENTMCNC: 32.2 G/DL (ref 31.5–36.5)
MCV RBC AUTO: 92 FL (ref 78–100)
PHOSPHATE SERPL-MCNC: 4 MG/DL (ref 2.5–4.5)
PLATELET # BLD AUTO: 243 10E3/UL (ref 150–450)
POTASSIUM BLD-SCNC: 4.1 MMOL/L (ref 3.4–5.3)
RBC # BLD AUTO: 3.13 10E6/UL (ref 3.8–5.2)
SODIUM SERPL-SCNC: 140 MMOL/L (ref 133–144)
WBC # BLD AUTO: 7.4 10E3/UL (ref 4–11)

## 2022-09-08 PROCEDURE — 250N000013 HC RX MED GY IP 250 OP 250 PS 637: Performed by: PHYSICIAN ASSISTANT

## 2022-09-08 PROCEDURE — 258N000003 HC RX IP 258 OP 636: Performed by: PHYSICIAN ASSISTANT

## 2022-09-08 PROCEDURE — 83735 ASSAY OF MAGNESIUM: CPT | Performed by: INTERNAL MEDICINE

## 2022-09-08 PROCEDURE — 82330 ASSAY OF CALCIUM: CPT | Performed by: PHYSICIAN ASSISTANT

## 2022-09-08 PROCEDURE — 85027 COMPLETE CBC AUTOMATED: CPT | Performed by: PHYSICIAN ASSISTANT

## 2022-09-08 PROCEDURE — 250N000011 HC RX IP 250 OP 636: Performed by: PHYSICIAN ASSISTANT

## 2022-09-08 PROCEDURE — 84100 ASSAY OF PHOSPHORUS: CPT | Performed by: INTERNAL MEDICINE

## 2022-09-08 PROCEDURE — 120N000001 HC R&B MED SURG/OB

## 2022-09-08 PROCEDURE — 36415 COLL VENOUS BLD VENIPUNCTURE: CPT | Performed by: PHYSICIAN ASSISTANT

## 2022-09-08 PROCEDURE — 97110 THERAPEUTIC EXERCISES: CPT | Mod: GP

## 2022-09-08 PROCEDURE — 80048 BASIC METABOLIC PNL TOTAL CA: CPT | Performed by: PHYSICIAN ASSISTANT

## 2022-09-08 RX ADMIN — ACETAMINOPHEN 975 MG: 325 TABLET ORAL at 05:35

## 2022-09-08 RX ADMIN — HEPARIN SODIUM 5000 UNITS: 5000 INJECTION, SOLUTION INTRAVENOUS; SUBCUTANEOUS at 05:36

## 2022-09-08 RX ADMIN — POLYETHYLENE GLYCOL 3350 17 G: 17 POWDER, FOR SOLUTION ORAL at 08:29

## 2022-09-08 RX ADMIN — PANTOPRAZOLE SODIUM 40 MG: 40 TABLET, DELAYED RELEASE ORAL at 08:29

## 2022-09-08 RX ADMIN — ATORVASTATIN CALCIUM 20 MG: 10 TABLET, FILM COATED ORAL at 20:59

## 2022-09-08 RX ADMIN — ACETAMINOPHEN 975 MG: 325 TABLET ORAL at 13:42

## 2022-09-08 RX ADMIN — SODIUM CHLORIDE, POTASSIUM CHLORIDE, SODIUM LACTATE AND CALCIUM CHLORIDE 500 ML: 600; 310; 30; 20 INJECTION, SOLUTION INTRAVENOUS at 10:26

## 2022-09-08 RX ADMIN — ASPIRIN 81 MG CHEWABLE TABLET 162 MG: 81 TABLET CHEWABLE at 08:29

## 2022-09-08 RX ADMIN — HEPARIN SODIUM 5000 UNITS: 5000 INJECTION, SOLUTION INTRAVENOUS; SUBCUTANEOUS at 21:00

## 2022-09-08 RX ADMIN — METOPROLOL TARTRATE 6.25 MG: 25 TABLET, FILM COATED ORAL at 14:30

## 2022-09-08 RX ADMIN — SENNOSIDES AND DOCUSATE SODIUM 1 TABLET: 50; 8.6 TABLET ORAL at 20:59

## 2022-09-08 RX ADMIN — SENNOSIDES AND DOCUSATE SODIUM 1 TABLET: 50; 8.6 TABLET ORAL at 08:29

## 2022-09-08 RX ADMIN — SODIUM CHLORIDE, POTASSIUM CHLORIDE, SODIUM LACTATE AND CALCIUM CHLORIDE 500 ML: 600; 310; 30; 20 INJECTION, SOLUTION INTRAVENOUS at 10:32

## 2022-09-08 RX ADMIN — HEPARIN SODIUM 5000 UNITS: 5000 INJECTION, SOLUTION INTRAVENOUS; SUBCUTANEOUS at 13:42

## 2022-09-08 RX ADMIN — ACETAMINOPHEN 975 MG: 325 TABLET ORAL at 21:00

## 2022-09-08 RX ADMIN — LIDOCAINE 1 PATCH: 560 PATCH PERCUTANEOUS; TOPICAL; TRANSDERMAL at 20:59

## 2022-09-08 ASSESSMENT — ACTIVITIES OF DAILY LIVING (ADL)
ADLS_ACUITY_SCORE: 22
ADLS_ACUITY_SCORE: 25
ADLS_ACUITY_SCORE: 22
ADLS_ACUITY_SCORE: 22
ADLS_ACUITY_SCORE: 25

## 2022-09-08 NOTE — PLAN OF CARE
AxOx4.  Tele = SR / ST. POD#6.     VSS on RA except tachycardia at times (100's/110's). SBA. Regular diet. Up to bathroom with adequate output. No BM this shift.    PIV x1 SL.    Sternal incision + BLE harvest sites WDL + GENE with liquid bandage.    Pain managed with scheduled Tylenol, PRN Robaxin x1 + ice packs.    Continue plan of care.  Plan to discharge home today.

## 2022-09-08 NOTE — PROGRESS NOTES
[facility]  Cardiovascular and Thoracic Surgery Daily Note      Assessment and Plan  POD # 6 s/p s/p Coronary artery bypass grafting x 3 (left internal mammary artery to left anterior descending artery, reversed saphenous vein graft to right posterior descending artery, reversed saphenous vein graft to obtuse marginal artery on 09/02 with Dr. Berto Tsai    - CVS: Pre-op TTE with LVEF 45-50%. Low dose Lopressor 6.25 mg BID on hold due to orthostatic hypotension will resume  x1 dose today due to tachycardia at rest today and evaluate how she tolerates. Appears near euvolemic, weight slightly above admission weight, but oxygenating well on RA, defer diuresis given orthostatic hypotension. Received IVF 09/06, will give additional IVF today for tachycardia. Aspirin 162 mg daily, atorvastatin 20 mg daily. Chest tubes: removed TPW: removed    - Resp: Extubated POD0. IS, pulmonary toilet. Recent COVID PNA, PTA course of prednisone stopped.     - Neuro: Neuro intact, pain controlled on current regimen    - Renal: No history significant renal disease. Cr stable, WNL. Trend BMP. Diuretic as above.  Recent Labs   Lab 09/08/22  0533 09/07/22  0507 09/06/22  0013   CR 0.53 0.60 0.51*       - GI: +BM, continue bowel regimen    - : Gandhi removed, voiding without issue    - Endo: Pre diabetes, insulin infusion transtitoned to sliding scale insulin, now discontinued  Hemoglobin A1C   Date Value Ref Range Status   09/02/2022 6.0 (H) 0.0 - 5.6 % Final     Comment:     Normal <5.7%   Prediabetes 5.7-6.4%    Diabetes 6.5% or higher     Note: Adopted from ADA consensus guidelines.   11/02/2019 5.3 0 - 5.6 % Final     Comment:     Normal <5.7% Prediabetes 5.7-6.4%  Diabetes 6.5% or higher - adopted from ADA   consensus guidelines.          - FEN: Replace electrolytes as needed. Regular diet    - ID: Postop leukocytosis, resolved. Afebrile.  WBC WNL. Periop abx complete. Trend CBC.   Recent Labs   Lab 09/08/22  0533  09/06/22  0013 09/04/22  2358   WBC 7.4 9.4 9.9       - Heme: Acute blood loss anemia and thrombocytopenia due to surgery. Hgb and PLT stable. Trend CBC, transfuse PRN.   Recent Labs   Lab 09/08/22  0533 09/06/22  0717 09/06/22  0013 09/04/22  2358   HGB 9.3*  --  9.5* 9.3*    168 164 140*       - Proph: SCD, subcutaneous heparin, PPI    - Dispo: Will continue inpatient today and trial BB again. Plan for discharge to home tomorrow if appropriate. Patient would like to have home care initially-- consult placed to social work today      Interval History  No acute events overnight. Saturating well on room air. Pain controlled. Tolerating diet. +BM. Patient feels better today but is tachycardic and reports feeling palpitations.       Medications    acetaminophen  975 mg Oral Q8H     aspirin  162 mg Oral or NG Tube Daily     atorvastatin  20 mg Oral QPM     heparin ANTICOAGULANT  5,000 Units Subcutaneous Q8H     lactated ringers  500 mL Intravenous Once     Lidocaine  1 patch Transdermal Q24H     lidocaine   Transdermal Q8H DEVON     magnesium hydroxide  30 mL Oral Daily     [Held by provider] metoprolol tartrate  6.25 mg Oral BID     pantoprazole  40 mg Oral or NG Tube Daily    Or     pantoprazole  40 mg Oral Daily     polyethylene glycol  17 g Oral Daily     senna-docusate  1 tablet Oral BID     sodium chloride (PF)  3 mL Intracatheter Q8H     acetaminophen, bisacodyl, calcium gluconate, calcium gluconate, calcium gluconate, glucose **OR** dextrose **OR** glucagon, hydrALAZINE, lidocaine 4%, lidocaine (buffered or not buffered), magnesium hydroxide, methocarbamol, naloxone **OR** naloxone **OR** naloxone **OR** naloxone, ondansetron **OR** ondansetron, oxyCODONE **OR** oxyCODONE, prochlorperazine **OR** prochlorperazine, BETA BLOCKER NOT PRESCRIBED, sodium chloride (PF), traMADol      Physical Exam  Vitals were reviewed  Blood pressure 108/61, pulse 92, temperature 98.3  F (36.8  C), temperature source Oral, resp.  "rate 16, height 1.499 m (4' 11\"), weight 60.1 kg (132 lb 8 oz), SpO2 95 %, not currently breastfeeding.  Rhythm: NSR    Lungs: CTA    Cardiovascular: tachycardic normal s1 and s2, no m/r/g    Abdomen: soft, NT, ND, +BS    Extremeties: warm, no LE edema    Incision: CDI    CT: removed    Weight:   Vitals:    09/04/22 0400 09/05/22 0600 09/05/22 1148 09/07/22 0515   Weight: 58.7 kg (129 lb 6.6 oz) 59 kg (130 lb 1.1 oz) 61.1 kg (134 lb 11.2 oz) 61.2 kg (135 lb)    09/08/22 0444   Weight: 60.1 kg (132 lb 8 oz)         Data  BMP  Recent Labs   Lab 09/08/22  0759 09/08/22  0533 09/07/22  1129 09/07/22  0815 09/07/22  0507 09/06/22  0756 09/06/22  0717 09/06/22  0013 09/05/22  0642 09/05/22  0556   NA  --  140  --   --  142  --   --  140  --  140   POTASSIUM  --  4.1  --   --  4.1  4.0  --  3.9 4.1   < > 3.3*   CHLORIDE  --  109  --   --  107  --   --  108  --  109   APRIL  --  8.6  --   --  8.4*  --   --  8.2*  --  8.5   CO2  --  24  --   --  28  --   --  27  --  28   BUN  --  12  --   --  10  --   --  13  --  14   CR  --  0.53  --   --  0.60  --   --  0.51*  --  0.57   * 125* 124* 128* 132*   < >  --  118*   < > 110*    < > = values in this interval not displayed.     CBC  Recent Labs   Lab 09/08/22  0533 09/06/22  0717 09/06/22  0013 09/04/22  2358 09/04/22  0411   WBC 7.4  --  9.4 9.9 12.7*   RBC 3.13*  --  3.19* 3.15* 2.86*   HGB 9.3*  --  9.5* 9.3* 8.5*   HCT 28.9*  --  29.3* 28.2* 26.6*   MCV 92  --  92 90 93   MCH 29.7  --  29.8 29.5 29.7   MCHC 32.2  --  32.4 33.0 32.0   RDW 14.4  --  13.3 13.2 13.5    168 164 140* 111*     INR  Recent Labs   Lab 09/02/22  1429 09/02/22  1226   INR 1.26* 1.55*      Hepatic Panel  Recent Labs   Lab 09/04/22  0411 09/02/22  2305 09/02/22  1429   AST 22 55* 68*   ALT 21 34 36   ALKPHOS 38* 50 48   BILITOTAL 1.1 0.7 0.6   ALBUMIN 3.0* 3.3* 2.6*       Imaging:  No results found for this or any previous visit (from the past 24 hour(s)).      Patient seen and discussed with " AUDIE GilC  Cardiothoracic Surgery  Pager [phone]

## 2022-09-08 NOTE — PLAN OF CARE
Goal Outcome Evaluation:    Plan of Care Reviewed With: patient     Overall Patient Progress: improving    A&O x4, VSS with Tachycardia in 120's, One time dose of metoprolol administered per orders. Tele: SR/ST. Pain controlled with angélica tylenol. Sternal incision, bilateral harvest sites, and old CT site WDL. CMS intact, pulses palpable. LS: clear. BS: audible. +void, +bm. Up SBA

## 2022-09-09 ENCOUNTER — APPOINTMENT (OUTPATIENT)
Dept: PHYSICAL THERAPY | Facility: CLINIC | Age: 72
DRG: 234 | End: 2022-09-09
Payer: COMMERCIAL

## 2022-09-09 VITALS
RESPIRATION RATE: 16 BRPM | SYSTOLIC BLOOD PRESSURE: 110 MMHG | WEIGHT: 132.5 LBS | OXYGEN SATURATION: 96 % | TEMPERATURE: 97.6 F | HEART RATE: 82 BPM | BODY MASS INDEX: 26.71 KG/M2 | HEIGHT: 59 IN | DIASTOLIC BLOOD PRESSURE: 62 MMHG

## 2022-09-09 PROBLEM — R73.9 TRANSIENT HYPERGLYCEMIA POST PROCEDURE: Status: ACTIVE | Noted: 2022-09-09

## 2022-09-09 PROBLEM — D62 ANEMIA DUE TO BLOOD LOSS, ACUTE: Status: ACTIVE | Noted: 2022-09-09

## 2022-09-09 LAB
ANION GAP SERPL CALCULATED.3IONS-SCNC: 6 MMOL/L (ref 3–14)
BUN SERPL-MCNC: 10 MG/DL (ref 7–30)
CA-I BLD-MCNC: 4.7 MG/DL (ref 4.4–5.2)
CALCIUM SERPL-MCNC: 8.6 MG/DL (ref 8.5–10.1)
CHLORIDE BLD-SCNC: 111 MMOL/L (ref 94–109)
CO2 SERPL-SCNC: 25 MMOL/L (ref 20–32)
CREAT SERPL-MCNC: 0.66 MG/DL (ref 0.52–1.04)
ERYTHROCYTE [DISTWIDTH] IN BLOOD BY AUTOMATED COUNT: 15.3 % (ref 10–15)
GFR SERPL CREATININE-BSD FRML MDRD: >90 ML/MIN/1.73M2
GLUCOSE BLD-MCNC: 110 MG/DL (ref 70–99)
HCT VFR BLD AUTO: 29.3 % (ref 35–47)
HGB BLD-MCNC: 9.2 G/DL (ref 11.7–15.7)
MAGNESIUM SERPL-MCNC: 2.3 MG/DL (ref 1.6–2.3)
MCH RBC QN AUTO: 29.9 PG (ref 26.5–33)
MCHC RBC AUTO-ENTMCNC: 31.4 G/DL (ref 31.5–36.5)
MCV RBC AUTO: 95 FL (ref 78–100)
PHOSPHATE SERPL-MCNC: 4 MG/DL (ref 2.5–4.5)
PLATELET # BLD AUTO: 280 10E3/UL (ref 150–450)
POTASSIUM BLD-SCNC: 4.2 MMOL/L (ref 3.4–5.3)
RBC # BLD AUTO: 3.08 10E6/UL (ref 3.8–5.2)
SODIUM SERPL-SCNC: 142 MMOL/L (ref 133–144)
WBC # BLD AUTO: 7.4 10E3/UL (ref 4–11)

## 2022-09-09 PROCEDURE — 80048 BASIC METABOLIC PNL TOTAL CA: CPT | Performed by: PHYSICIAN ASSISTANT

## 2022-09-09 PROCEDURE — 250N000011 HC RX IP 250 OP 636: Performed by: PHYSICIAN ASSISTANT

## 2022-09-09 PROCEDURE — 85027 COMPLETE CBC AUTOMATED: CPT | Performed by: PHYSICIAN ASSISTANT

## 2022-09-09 PROCEDURE — 82330 ASSAY OF CALCIUM: CPT | Performed by: PHYSICIAN ASSISTANT

## 2022-09-09 PROCEDURE — 250N000013 HC RX MED GY IP 250 OP 250 PS 637: Performed by: PHYSICIAN ASSISTANT

## 2022-09-09 PROCEDURE — 97530 THERAPEUTIC ACTIVITIES: CPT | Mod: GP

## 2022-09-09 PROCEDURE — 36415 COLL VENOUS BLD VENIPUNCTURE: CPT | Performed by: PHYSICIAN ASSISTANT

## 2022-09-09 PROCEDURE — 84100 ASSAY OF PHOSPHORUS: CPT | Performed by: SURGERY

## 2022-09-09 PROCEDURE — 97110 THERAPEUTIC EXERCISES: CPT | Mod: GP

## 2022-09-09 PROCEDURE — 83735 ASSAY OF MAGNESIUM: CPT | Performed by: SURGERY

## 2022-09-09 RX ORDER — ACETAMINOPHEN 325 MG/1
650 TABLET ORAL EVERY 6 HOURS PRN
Qty: 100 TABLET | Refills: 0 | Status: SHIPPED | OUTPATIENT
Start: 2022-09-09 | End: 2022-09-09

## 2022-09-09 RX ORDER — ASPIRIN 81 MG/1
162 TABLET, CHEWABLE ORAL DAILY
Qty: 60 TABLET | Refills: 3 | Status: SHIPPED | OUTPATIENT
Start: 2022-09-09 | End: 2022-09-09

## 2022-09-09 RX ORDER — METOPROLOL SUCCINATE 25 MG/1
12.5 TABLET, EXTENDED RELEASE ORAL DAILY
Qty: 30 TABLET | Refills: 3 | Status: SHIPPED | OUTPATIENT
Start: 2022-09-09 | End: 2022-12-13

## 2022-09-09 RX ORDER — ACETAMINOPHEN 325 MG/1
650 TABLET ORAL EVERY 6 HOURS PRN
Qty: 100 TABLET | Refills: 0 | Status: SHIPPED | OUTPATIENT
Start: 2022-09-09

## 2022-09-09 RX ORDER — METHOCARBAMOL 500 MG/1
500 TABLET, FILM COATED ORAL EVERY 6 HOURS PRN
Qty: 40 TABLET | Refills: 1 | Status: SHIPPED | OUTPATIENT
Start: 2022-09-09 | End: 2022-09-09

## 2022-09-09 RX ORDER — AMOXICILLIN 250 MG
1 CAPSULE ORAL 2 TIMES DAILY
Qty: 30 TABLET | Refills: 0 | Status: ON HOLD | OUTPATIENT
Start: 2022-09-09 | End: 2023-09-28

## 2022-09-09 RX ORDER — ATORVASTATIN CALCIUM 20 MG/1
20 TABLET, FILM COATED ORAL EVERY EVENING
Qty: 30 TABLET | Refills: 3 | Status: SHIPPED | OUTPATIENT
Start: 2022-09-09 | End: 2022-09-09

## 2022-09-09 RX ORDER — TRAMADOL HYDROCHLORIDE 50 MG/1
50 TABLET ORAL EVERY 6 HOURS PRN
Qty: 20 TABLET | Refills: 0 | Status: SHIPPED | OUTPATIENT
Start: 2022-09-09 | End: 2023-04-07

## 2022-09-09 RX ORDER — ATORVASTATIN CALCIUM 20 MG/1
20 TABLET, FILM COATED ORAL EVERY EVENING
Qty: 30 TABLET | Refills: 3 | Status: SHIPPED | OUTPATIENT
Start: 2022-09-09 | End: 2022-12-13

## 2022-09-09 RX ORDER — TRAMADOL HYDROCHLORIDE 50 MG/1
50 TABLET ORAL EVERY 6 HOURS PRN
Qty: 20 TABLET | Refills: 0 | Status: SHIPPED | OUTPATIENT
Start: 2022-09-09 | End: 2022-09-09

## 2022-09-09 RX ORDER — METOPROLOL SUCCINATE 25 MG/1
12.5 TABLET, EXTENDED RELEASE ORAL DAILY
Qty: 30 TABLET | Refills: 3 | Status: SHIPPED | OUTPATIENT
Start: 2022-09-09 | End: 2022-09-09

## 2022-09-09 RX ORDER — AMOXICILLIN 250 MG
1 CAPSULE ORAL 2 TIMES DAILY
Qty: 30 TABLET | Refills: 0 | Status: SHIPPED | OUTPATIENT
Start: 2022-09-09 | End: 2022-09-09

## 2022-09-09 RX ORDER — FAMOTIDINE 20 MG/1
20 TABLET, FILM COATED ORAL DAILY
Qty: 14 TABLET | Refills: 0 | Status: SHIPPED | OUTPATIENT
Start: 2022-09-09 | End: 2022-12-13

## 2022-09-09 RX ORDER — ASPIRIN 81 MG/1
162 TABLET, CHEWABLE ORAL DAILY
Qty: 60 TABLET | Refills: 3 | Status: SHIPPED | OUTPATIENT
Start: 2022-09-09 | End: 2022-12-13

## 2022-09-09 RX ORDER — POLYETHYLENE GLYCOL 3350 17 G/17G
17 POWDER, FOR SOLUTION ORAL DAILY PRN
Qty: 510 G | Refills: 0 | Status: SHIPPED | OUTPATIENT
Start: 2022-09-09

## 2022-09-09 RX ORDER — METHOCARBAMOL 500 MG/1
500 TABLET, FILM COATED ORAL EVERY 6 HOURS PRN
Qty: 40 TABLET | Refills: 1 | Status: SHIPPED | OUTPATIENT
Start: 2022-09-09 | End: 2023-04-07

## 2022-09-09 RX ORDER — POLYETHYLENE GLYCOL 3350 17 G/17G
17 POWDER, FOR SOLUTION ORAL DAILY PRN
Qty: 510 G | Refills: 0 | Status: SHIPPED | OUTPATIENT
Start: 2022-09-09 | End: 2022-09-09

## 2022-09-09 RX ADMIN — ACETAMINOPHEN 975 MG: 325 TABLET ORAL at 11:51

## 2022-09-09 RX ADMIN — ASPIRIN 81 MG CHEWABLE TABLET 162 MG: 81 TABLET CHEWABLE at 09:07

## 2022-09-09 RX ADMIN — HEPARIN SODIUM 5000 UNITS: 5000 INJECTION, SOLUTION INTRAVENOUS; SUBCUTANEOUS at 05:19

## 2022-09-09 RX ADMIN — METOPROLOL SUCCINATE 12.5 MG: 25 TABLET, EXTENDED RELEASE ORAL at 10:08

## 2022-09-09 RX ADMIN — ACETAMINOPHEN 975 MG: 325 TABLET ORAL at 05:19

## 2022-09-09 RX ADMIN — SENNOSIDES AND DOCUSATE SODIUM 1 TABLET: 50; 8.6 TABLET ORAL at 09:08

## 2022-09-09 RX ADMIN — POLYETHYLENE GLYCOL 3350 17 G: 17 POWDER, FOR SOLUTION ORAL at 09:07

## 2022-09-09 RX ADMIN — PANTOPRAZOLE SODIUM 40 MG: 40 TABLET, DELAYED RELEASE ORAL at 09:08

## 2022-09-09 RX ADMIN — METHOCARBAMOL 500 MG: 500 TABLET ORAL at 02:06

## 2022-09-09 ASSESSMENT — ACTIVITIES OF DAILY LIVING (ADL)
ADLS_ACUITY_SCORE: 25
ADLS_ACUITY_SCORE: 22
ADLS_ACUITY_SCORE: 22
ADLS_ACUITY_SCORE: 25
ADLS_ACUITY_SCORE: 22

## 2022-09-09 NOTE — PROGRESS NOTES
1900h-0700h: Pt AOx4. Saturating well on RA,  O2Sat >92%. Fine crackles in R upper lobe, RML, & clear diminished in L lung. Non productive cough. IS encouraged, able to perform 800 ml x 10 reps & acapella level 5 x 10 reps. Trace edema noted on BLE, sensation intact w/ palpable pulses. Ambulates to BR w/ SBA. 3/10 pain on sternal incision site, managed w/ tylenol; effective. Skin intact on bony prominences. Sternal incisions & harvest sites are all well approximated & GEEN.    Tele: S. Tachycardia at 110's.  Possible discharge 09/10.   Serum K, Mg & Ph within normal range; no replacement needed.

## 2022-09-09 NOTE — CARE PLAN
Physical Therapy Discharge Summary    Reason for therapy discharge:    Discharged to home with home therapy and assist of family. Plans for discharge today.     Progress towards therapy goal(s). See goals on Care Plan in [facility] electronic health record for goal details.  Goals partially met.  Barriers to achieving goals:   discharge from facility.    Therapy recommendation(s):    Continued therapy is recommended.  Rationale/Recommendations:  pt will benefit from outpt CR phase II to optimize heart health and improve her endurance. .  Continue home exercise program.

## 2022-09-09 NOTE — CONSULTS
Care Management Initial Consult    General Information  Assessment completed with: Patient,    Type of CM/SW Visit: Initial Assessment  Primary Care Provider verified and updated as needed: Yes   Readmission within the last 30 days: no previous admission in last 30 days      Reason for Consult: discharge planning  Communication Assessment  Patient's communication style: spoken language (English or Bilingual)    Hearing Difficulty or Deaf: no   Wear Glasses or Blind: no  Cognitive  Cognitive/Neuro/Behavioral: WDL  Level of Consciousness: alert  Arousal Level: opens eyes spontaneously  Orientation: oriented x 4  Mood/Behavior: calm, cooperative  Best Language: 0 - No aphasia  Speech: clear, spontaneous  Living Environment:   People in home: spouse  Ty  Current living Arrangements: house      Able to return to prior arrangements: yes  Family/Social Support:  Care provided by: self  Provides care for: no one  Marital Status:             Description of Support System: Supportive, Involved    Current Resources:   Patient receiving home care services: No  Community Resources: None  Equipment currently used at home: none  Supplies currently used at home:    Employment/Financial:  Employment Status: retired     Financial Concerns: No concerns identified   Referral to Financial Worker: No      Additional Information:  Met with pt at the bedside. Introduced self and role in discharge planning. Pt lives with spouse in a house in [place]. 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     Pt agreeable to home care services PT/OT. Accepted by [facility] for PT/OT SOC 9/13. Cardiothoracic surgery and cardiology follow-ups already arranged and on AVS. [facility] request placed for PCP follow-up.    CM team available for any additional discharge needs that may arise.     Suzette Em, RN, BSN, PHN, CMSRN  Care Coordination  [facility]

## 2022-09-09 NOTE — DISCHARGE SUMMARY
"Discharge Summary    Kalani Rowan MRN# [id number]   YOB: 1950 Age: 71 year old     Date of Admission:  8/31/2022  Date of Discharge:  9/9/2022  Admitting Physician:  Tejinder Ovalles MD  Discharge Physician:  Berto Tsai, *  Discharging Service:  Cardiovascular and Thoracic Surgery     Home clinic: [facility]  Primary Provider: Lucia Lind          Admission Diagnoses:   NSTEMI (non-ST elevated myocardial infarction) (H) [I21.4]          Discharge Diagnosis:     NSTEMI s/p CABG  Ischemic cardiomyopathy with mild systolic dysfunction (LVEF 45-50%)  Pre-diabetes             Discharge Disposition:     Discharged to home           Condition on Discharge:     Discharge condition: Good   Discharge vitals: Blood pressure 110/62, pulse 82, temperature 97.6  F (36.4  C), temperature source Oral, resp. rate 16, height 1.499 m (4' 11\"), weight 60.1 kg (132 lb 8 oz), SpO2 96 %, not currently breastfeeding.     Code status on discharge: Full Code           Procedures:   On 09/02/2022 with Dr. Berto Tsai:  1) Coronary artery bypass grafting x 3              - Left internal mammary artery to left anterior descending artery              - Reversed saphenous vein graft to right posterior descending artery              - Reversed saphenous vein graft to obtuse marginal artery   2) Endoscopic vein harvest left and right lower extremity          Medications Prior to Admission:     Facility-Administered Medications Prior to Admission   Medication Dose Route Frequency Provider Last Rate Last Admin     [DISCONTINUED] sodium chloride (PF) 0.9% PF flush 3 mL  3 mL Intravenous q1 min prn Tessa Soler PA-C         Medications Prior to Admission   Medication Sig Dispense Refill Last Dose     fluticasone-salmeterol (ADVAIR-HFA) 230-21 MCG/ACT inhaler Inhale 2 puffs into the lungs 2 times daily 12 g 3 8/31/2022 at am     albuterol (PROAIR HFA) 108 (90 Base) MCG/ACT inhaler " Inhale 1-2 puffs into the lungs every 6 hours as needed for shortness of breath / dyspnea 18 g 4      [DISCONTINUED] albuterol (PROVENTIL) (2.5 MG/3ML) 0.083% neb solution Take 2.5 mg by nebulization every 6 hours as needed for shortness of breath / dyspnea or wheezing   8/31/2022 at am     [DISCONTINUED] predniSONE (DELTASONE) 10 MG tablet Take by mouth 2 times daily Taper: 10 mg twice daily 8/31/22 and 9/1/22, then 5 mg twice daily for 5 days   8/31/2022 at am             Discharge Medications:        Review of your medicines      START taking      Dose / Directions   acetaminophen 325 MG tablet  Commonly known as: TYLENOL      Dose: 650 mg  Take 2 tablets (650 mg) by mouth every 6 hours as needed for other (For optimal non-opioid multimodal pain management to improve pain control.)  Quantity: 100 tablet  Refills: 0     aspirin 81 MG chewable tablet  Commonly known as: ASA      Dose: 162 mg  2 tablets (162 mg) by Oral or NG Tube route daily  Quantity: 60 tablet  Refills: 3     atorvastatin 20 MG tablet  Commonly known as: LIPITOR      Dose: 20 mg  Take 1 tablet (20 mg) by mouth every evening  Quantity: 30 tablet  Refills: 3     famotidine 20 MG tablet  Commonly known as: PEPCID      Dose: 20 mg  Take 1 tablet (20 mg) by mouth daily  Quantity: 14 tablet  Refills: 0     methocarbamol 500 MG tablet  Commonly known as: ROBAXIN      Dose: 500 mg  Take 1 tablet (500 mg) by mouth every 6 hours as needed for muscle spasms  Quantity: 40 tablet  Refills: 1     metoprolol succinate ER 25 MG 24 hr tablet  Commonly known as: TOPROL XL      Dose: 12.5 mg  Take 0.5 tablets (12.5 mg) by mouth daily  Quantity: 30 tablet  Refills: 3     polyethylene glycol 17 GM/Dose powder  Commonly known as: MIRALAX      Dose: 17 g  Take 17 g by mouth daily as needed for constipation  Quantity: 510 g  Refills: 0     senna-docusate 8.6-50 MG tablet  Commonly known as: SENOKOT-S/PERICOLACE      Dose: 1 tablet  Take 1 tablet by mouth 2 times  daily  Quantity: 30 tablet  Refills: 0     traMADol 50 MG tablet  Commonly known as: ULTRAM      Dose: 50 mg  Take 1 tablet (50 mg) by mouth every 6 hours as needed for moderate pain  Quantity: 20 tablet  Refills: 0        CONTINUE these medicines which may have CHANGED, or have new prescriptions. If we are uncertain of the size of tablets/capsules you have at home, strength may be listed as something that might have changed.      Dose / Directions   albuterol 108 (90 Base) MCG/ACT inhaler  Commonly known as: ProAir HFA  This may have changed: Another medication with the same name was removed. Continue taking this medication, and follow the directions you see here.  Used for: Mild intermittent asthma, unspecified whether complicated      Dose: 1-2 puff  Inhale 1-2 puffs into the lungs every 6 hours as needed for shortness of breath / dyspnea  Quantity: 18 g  Refills: 4        CONTINUE these medicines which have NOT CHANGED      Dose / Directions   fluticasone-salmeterol 230-21 MCG/ACT inhaler  Commonly known as: ADVAIR-HFA  Used for: Mild intermittent asthma, unspecified whether complicated      Dose: 2 puff  Inhale 2 puffs into the lungs 2 times daily  Quantity: 12 g  Refills: 3        STOP taking    predniSONE 10 MG tablet  Commonly known as: DELTASONE              Where to get your medicines      These medications were sent to [facility] [address] - [place] - [address] [address]  [address], [place] [address]    Phone: [phone]     acetaminophen 325 MG tablet    aspirin 81 MG chewable tablet    atorvastatin 20 MG tablet    famotidine 20 MG tablet    methocarbamol 500 MG tablet    metoprolol succinate ER 25 MG 24 hr tablet    polyethylene glycol 17 GM/Dose powder    senna-docusate 8.6-50 MG tablet    traMADol 50 MG tablet                Consultations:     Nutrition, Intensivist, cardiology             Brief History of Illness:   Kalani Rowan is a 71 year old female with a PMH of hypertension and  recent diagnosis of COVID PNA 07/2022 on prednisone taper who was admitted to [facility] 08/31 after presenting to the ER with chest pain and SOB. She was found to have NSTEMI, TTE with mild decrement in LVEF to 45-50%, and coronary angiogram revealed multivessel CAD. Cardiac surgery consulted for consideration of surgical revascularization. She underwent CABG x3 as above with Dr. Tsai on 09/02.             Hospital Course:     She tolerated the surgery well and was quickly weaned off vasoactive medications.     She was extubated on POD0.     Postoperatively she was noted to have symptomatic orthostatic hypotension for which she was given additional IV fluid with improvement in her symptoms. By discharge she was able to tolerate low-dose beta blockade without issue.     She was noted to be pre-diabetic on admission. Postoperatively she had some transient hyperglycemia, initially treated with an insulin gtt, transitioned to sliding scale insulin, and has no insulin need at discharge. Recommend PCP follow-up of pre-diabetes.     At discharge she is slightly above her pre-op weight, however appears euvolemic, and as above with recent orthostatic hypotension is not sent with diuretic at discharge.     She progressed well with cardiac rehab and is discharged to home with home PT/OT on POD7 with the help of her family.              Significant Results:     Lab Results   Component Value Date    WBC 7.4 09/09/2022    WBC 6.9 11/02/2019     Lab Results   Component Value Date    RBC 3.08 09/09/2022    RBC 5.41 11/02/2019     Lab Results   Component Value Date    HGB 9.2 09/09/2022    HGB 16.6 11/02/2019     Lab Results   Component Value Date    HCT 29.3 09/09/2022    HCT 47.2 11/02/2019     No components found for: MCT  Lab Results   Component Value Date    MCV 95 09/09/2022    MCV 87 11/02/2019     Lab Results   Component Value Date    MCH 29.9 09/09/2022    MCH 30.7 11/02/2019     Lab Results   Component Value Date    MCHC  31.4 09/09/2022    MCHC 35.2 11/02/2019     Lab Results   Component Value Date    RDW 15.3 09/09/2022    RDW 13.3 11/02/2019     Lab Results   Component Value Date     09/09/2022     11/02/2019       Last Basic Metabolic Panel:  Lab Results   Component Value Date     09/09/2022     11/02/2019      Lab Results   Component Value Date    POTASSIUM 4.2 09/09/2022    POTASSIUM 3.6 11/02/2019     Lab Results   Component Value Date    CHLORIDE 111 09/09/2022    CHLORIDE 106 11/02/2019     Lab Results   Component Value Date    APRIL 8.6 09/09/2022    APRIL 9.6 11/02/2019     Lab Results   Component Value Date    CO2 25 09/09/2022    CO2 23 11/02/2019     Lab Results   Component Value Date    BUN 10 09/09/2022    BUN 19 11/02/2019     Lab Results   Component Value Date    CR 0.66 09/09/2022    CR 0.65 11/02/2019     Lab Results   Component Value Date     09/09/2022     11/02/2019                  Pending Results:     None           Discharge Instructions and Follow-Up:     Discharge diet: Regular   Discharge activity: Daily weights: Call if weight gain 2-3 lbs over 24 hours or if greater than 5 lbs in 1 week.  No lifting more than 10 lbs for 8-12 weeks.  No driving for 1 month.  Call for pain medication refill.  Call for temperature greater than 101.0  Daily shower with antibacterial soap.   Discharge follow-up: CV Surgery [facility] on 9/22/22 at 2pm at the Heart Clinic at [facility].   Primary care provider in 2-4 weeks after discharge.   Cardiology, Dr. Villalobos, on 12/13/22    Outpatient therapy: Cardiac rehab, home care PT/OT   [facility] Care agency: [facility]   Supplies and equipment: None   Lines and drains: None    Wound care: Wash incision daily with antibacterial soap   Other instructions: None      Shea Flower PA-C  Cardiothoracic Surgery  Pager [phone]

## 2022-09-09 NOTE — DISCHARGE INSTRUCTIONS
AFTER YOU GO HOME FROM YOUR HEART SURGERY  Coronary artery bypass grafting x 3 (left internal mammary artery to left anterior descending artery, reversed saphenous vein graft to right posterior descending artery, reversed saphenous vein graft to obtuse marginal artery on 09/02 with Dr. Berto Tsai    You had a sternotomy, avoid lifting, pushing, or pulling anything greater than ten pounds for 8 weeks after surgery and then less than 20-30 pounds for an additional 6 weeks. Do not reach backwards or use arms to push out of chair. Do not let people pull on your arms to assist with standing. Avoid twisting or reaching too far across your body.  Avoid strenuous activities such as bowling, vacuuming, raking, shoveling, golf or tennis for 12 weeks after your surgery. Splint your chest incision by hugging a pillow or bringing your arms across your chest when coughing or sneezing. Please try to sleep on your back for the first 4-6 weeks to avoid extra stress on your sternum (breastbone) while it is healing.     No driving for 4 weeks after surgery or while on pain medication.    Shower or wash your incisions daily with antibacterial soap and water (or as instructed), pat dry. Keep wound clean and dry, showers are okay after discharge, but don't let spray hit directly on incision. No baths or swimming for 6 weeks and/or until incisions are completely healed over. Cover chest tube sites with gauze until they stop draining, then leave open to air. It is normal for chest tube sites to drain fluid for up to 2-3 weeks after surgery. It is not normal to have drainage from other incisions.   Watch for signs of infection: increased redness, tenderness, warmth or any drainage from sternum and lower extremity incision.  Also a temperature > 100.5 F or chills. Call your surgeon or primary care provider's office immediately.     Exercise is very important in your recovery. Please follow the guidelines set up for you in your  cardiac rehab classes at the hospital. If outpatient cardiac rehab was ordered for you, we highly recommend you participate. If you have problems arranging your cardiac rehab, please call [phone] for all locations, with the exception of [place], please call [phone] and [place], please call [phone].    Avoid sitting for prolonged periods of time, try to walk every hour during the day. If you have a leg incision, elevate your leg often when you are not walking.    Check your weight when you get home from the hospital and continue to check it daily through your recovery for at least a month. If you notice a weight gain of 2-3 pounds in a week, notify your primary care physician, cardiologist or surgeon.    Bowel activity may be slow after surgery. If necessary, you may take an over the counter laxative such as Milk of Magnesia or Miralax. You may have stool softeners prescribed (docusate sodium, Senokot). We recommend using stool softeners while using narcotics for pain (oxycodone/percocet, hydrocodone/vicodin, hydromorphone/dilaudid).      Wean OFF of narcotics (oxycodone, dilaudid, hydrocodone, tramadol) as soon as possible. You should continue taking acetaminophen as long as you have any surgical pain as the first choice for pain control and add narcotics as necessary for pain to be tolerable.      DO NOT SMOKE.  IF YOU NEED HELP QUITTING, PLEASE TALK WITH YOUR CARDIOLOGIST OR PRIMARY DOCTOR.      REGARDING PRESCRIPTION REFILLS.  If you need a refill on your pain medication contact us to discuss your pain and a possible one time refill.   All other medications will be adjusted, discontinued and re-filled by your primary care physician and/or your cardiologist as they were prior to your surgery. We have given you enough for one to three month with possibly one refill. You may have refills available to you for some of your medications through the [facility]. If you would  like your refills sent to a different a different pharmacy, please contact your preferred pharmacy and let them know that you have prescriptions at [place] that you would like transferred.    POST-OPERATIVE CLINIC VISITS  You have a follow up visit with CV Surgery Advance Care Practitioners on 9/22/22  at 2pm at the Heart Clinic at [facility] in [place].  You will then return to the care of your primary provider and your cardiologist. Future medication refills should come from your PCP or Cardiologist.   You should see your primary care provider in 2-4 weeks after discharge.   Follow up with your cardiologist Dr. Villalobos on 12/13/22 as scheduled.   If you do not hear from a  in 7 days, please call [phone] and request to be seen with a general cardiologist or someone that you have seen in the past.     SURGICAL QUESTIONS  Please call our nurse coordinators, Ray, at [phone] with questions or concerns related to surgery.     On weekends or after hours, please call [phone] and ask the  to page the Cardiothoracic Surgeon on-call.      Thank you,    Your Cardiothoracic Surgery Team

## 2022-09-09 NOTE — PROGRESS NOTES
CV Surgery    S: No acute events overnight. Tolerated low dose metoprolol yesterday. Saturating well on room air, has some crackles in lung bases. Tolerating diet. +BM    O: B/P: 112/73, T: 97.6, P: 84, R: 16  General: NAD  Heart:  RRR normal s1 and s2  Lungs: diminished bases, +crackles  Abd: soft NTND  Sternum: CDI  Extremities: minimal edema    Lab Results   Component Value Date    WBC 7.4 09/09/2022    WBC 6.9 11/02/2019     Lab Results   Component Value Date    RBC 3.08 09/09/2022    RBC 5.41 11/02/2019     Lab Results   Component Value Date    HGB 9.2 09/09/2022    HGB 16.6 11/02/2019     Lab Results   Component Value Date    HCT 29.3 09/09/2022    HCT 47.2 11/02/2019     No components found for: MCT  Lab Results   Component Value Date    MCV 95 09/09/2022    MCV 87 11/02/2019     Lab Results   Component Value Date    MCH 29.9 09/09/2022    MCH 30.7 11/02/2019     Lab Results   Component Value Date    MCHC 31.4 09/09/2022    MCHC 35.2 11/02/2019     Lab Results   Component Value Date    RDW 15.3 09/09/2022    RDW 13.3 11/02/2019     Lab Results   Component Value Date     09/09/2022     11/02/2019       Last Basic Metabolic Panel:  Lab Results   Component Value Date     09/09/2022     11/02/2019      Lab Results   Component Value Date    POTASSIUM 4.2 09/09/2022    POTASSIUM 3.6 11/02/2019     Lab Results   Component Value Date    CHLORIDE 111 09/09/2022    CHLORIDE 106 11/02/2019     Lab Results   Component Value Date    APRIL 8.6 09/09/2022    APRIL 9.6 11/02/2019     Lab Results   Component Value Date    CO2 25 09/09/2022    CO2 23 11/02/2019     Lab Results   Component Value Date    BUN 10 09/09/2022    BUN 19 11/02/2019     Lab Results   Component Value Date    CR 0.66 09/09/2022    CR 0.65 11/02/2019     Lab Results   Component Value Date     09/09/2022     11/02/2019       A/P: POD#7 s/p Coronary artery bypass grafting x 3 (left internal mammary artery to left anterior  descending artery, reversed saphenous vein graft to right posterior descending artery, reversed saphenous vein graft to obtuse marginal artery on 09/02 with Dr. Berto Tsai    Will start Toprol 12.5mg today  Stable for discharge to home  Patient requesting home care therapies.     Sophie Stanley PA-C  Pager [phone]

## 2022-09-09 NOTE — PLAN OF CARE
Orientations: 4  Vitals/Pain: VSS, tylenol for sternal pain  Tele: SR, AV paced  Lines/Drains: PIV in R forearm  Skin/Wounds: sternal incision, harvest sites to bilateral groin and knees  GI/: WDL  Labs: Abnormal/Trends, Electrolyte Replacement- K, Mg, Ph - none replaced today  Ambulation/Assist: standby assist  Plan: discharge to home w , pt verbalized understanding of instructions, meds and belongings sent with patient.

## 2022-09-11 ENCOUNTER — PATIENT OUTREACH (OUTPATIENT)
Dept: CARE COORDINATION | Facility: CLINIC | Age: 72
End: 2022-09-11

## 2022-09-11 NOTE — PROGRESS NOTES
Clinic Care Coordination Contact  RUST/Voicemail       Clinical Data: Care Coordinator Outreach  Outreach attempted x 2.  Left message on patient's voicemail with call back information and requested return call.    Plan: Care Coordinator will do no further outreaches at this time.    BETITO Miguel  Connected Care Resource Center  [facility]     *Connected Care Resource Team does NOT follow patient ongoing. Referrals are identified based on internal discharge reports and the outreach is to ensure patient has an understanding of their discharge instructions.

## 2022-09-12 ENCOUNTER — HOSPITAL ENCOUNTER (OUTPATIENT)
Dept: GENERAL RADIOLOGY | Facility: CLINIC | Age: 72
Discharge: HOME | End: 2022-09-12
Attending: SURGERY
Payer: COMMERCIAL

## 2022-09-12 ENCOUNTER — LAB (OUTPATIENT)
Dept: LAB | Facility: CLINIC | Age: 72
End: 2022-09-12
Attending: SURGERY
Payer: COMMERCIAL

## 2022-09-12 ENCOUNTER — TELEPHONE (OUTPATIENT)
Dept: OTHER | Facility: CLINIC | Age: 72
End: 2022-09-12

## 2022-09-12 ENCOUNTER — HOSPITAL ENCOUNTER (OUTPATIENT)
Dept: ULTRASOUND IMAGING | Facility: CLINIC | Age: 72
Discharge: HOME | End: 2022-09-12
Attending: SURGERY
Payer: COMMERCIAL

## 2022-09-12 DIAGNOSIS — I63.9 CEREBROVASCULAR ACCIDENT (CVA), UNSPECIFIED MECHANISM (H): ICD-10-CM

## 2022-09-12 DIAGNOSIS — R22.42 LOCALIZED SWELLING, MASS AND LUMP, LEFT LOWER LIMB: ICD-10-CM

## 2022-09-12 DIAGNOSIS — I63.9 CEREBROVASCULAR ACCIDENT (CVA), UNSPECIFIED MECHANISM (H): Primary | ICD-10-CM

## 2022-09-12 DIAGNOSIS — Z00.00 PREVENTATIVE HEALTH CARE: ICD-10-CM

## 2022-09-12 LAB
ALBUMIN SERPL BCG-MCNC: 3.8 G/DL (ref 3.5–5.2)
ALP SERPL-CCNC: 147 U/L (ref 35–104)
ALT SERPL W P-5'-P-CCNC: 74 U/L (ref 10–35)
ANION GAP SERPL CALCULATED.3IONS-SCNC: 13 MMOL/L (ref 7–15)
AST SERPL W P-5'-P-CCNC: 31 U/L (ref 10–35)
BASOPHILS # BLD AUTO: 0 10E3/UL (ref 0–0.2)
BASOPHILS NFR BLD AUTO: 0 %
BILIRUB SERPL-MCNC: 0.2 MG/DL
BUN SERPL-MCNC: 13.3 MG/DL (ref 8–23)
CALCIUM SERPL-MCNC: 9.5 MG/DL (ref 8.8–10.2)
CHLORIDE SERPL-SCNC: 103 MMOL/L (ref 98–107)
CHOLEST SERPL-MCNC: 235 MG/DL
CREAT SERPL-MCNC: 0.7 MG/DL (ref 0.51–0.95)
DEPRECATED HCO3 PLAS-SCNC: 22 MMOL/L (ref 22–29)
EOSINOPHIL # BLD AUTO: 0.2 10E3/UL (ref 0–0.7)
EOSINOPHIL NFR BLD AUTO: 2 %
ERYTHROCYTE [DISTWIDTH] IN BLOOD BY AUTOMATED COUNT: 16.9 % (ref 10–15)
GFR SERPL CREATININE-BSD FRML MDRD: >90 ML/MIN/1.73M2
GLUCOSE SERPL-MCNC: 111 MG/DL (ref 70–99)
HCT VFR BLD AUTO: 32.9 % (ref 35–47)
HDLC SERPL-MCNC: 42 MG/DL
HGB BLD-MCNC: 10.4 G/DL (ref 11.7–15.7)
IMM GRANULOCYTES # BLD: 0 10E3/UL
IMM GRANULOCYTES NFR BLD: 0 %
LDLC SERPL CALC-MCNC: 128 MG/DL
LYMPHOCYTES # BLD AUTO: 1.6 10E3/UL (ref 0.8–5.3)
LYMPHOCYTES NFR BLD AUTO: 18 %
MCH RBC QN AUTO: 30.2 PG (ref 26.5–33)
MCHC RBC AUTO-ENTMCNC: 31.6 G/DL (ref 31.5–36.5)
MCV RBC AUTO: 96 FL (ref 78–100)
MONOCYTES # BLD AUTO: 0.6 10E3/UL (ref 0–1.3)
MONOCYTES NFR BLD AUTO: 7 %
NEUTROPHILS # BLD AUTO: 6.5 10E3/UL (ref 1.6–8.3)
NEUTROPHILS NFR BLD AUTO: 73 %
NONHDLC SERPL-MCNC: 193 MG/DL
NRBC # BLD AUTO: 0 10E3/UL
NRBC BLD AUTO-RTO: 0 /100
PLATELET # BLD AUTO: 435 10E3/UL (ref 150–450)
POTASSIUM SERPL-SCNC: 4.4 MMOL/L (ref 3.4–5.3)
PROT SERPL-MCNC: 6.6 G/DL (ref 6.4–8.3)
RBC # BLD AUTO: 3.44 10E6/UL (ref 3.8–5.2)
SODIUM SERPL-SCNC: 138 MMOL/L (ref 136–145)
TRIGL SERPL-MCNC: 323 MG/DL
TSH SERPL DL<=0.005 MIU/L-ACNC: 3.94 UIU/ML (ref 0.3–4.2)
WBC # BLD AUTO: 8.9 10E3/UL (ref 4–11)

## 2022-09-12 PROCEDURE — 71046 X-RAY EXAM CHEST 2 VIEWS: CPT

## 2022-09-12 PROCEDURE — 80061 LIPID PANEL: CPT

## 2022-09-12 PROCEDURE — 80053 COMPREHEN METABOLIC PANEL: CPT

## 2022-09-12 PROCEDURE — 85025 COMPLETE CBC W/AUTO DIFF WBC: CPT

## 2022-09-12 PROCEDURE — 84443 ASSAY THYROID STIM HORMONE: CPT

## 2022-09-12 PROCEDURE — 36415 COLL VENOUS BLD VENIPUNCTURE: CPT

## 2022-09-12 PROCEDURE — 93971 EXTREMITY STUDY: CPT | Mod: LT

## 2022-09-12 NOTE — TELEPHONE ENCOUNTER
48 hour post op call    ACTIVITY  How are you doing with activity?  I am doing ok, my left leg is bothersome so limiting me.   Are you continuing to use your IS 3-4 times a day and do you have any shortness of breath. Yes, I am getting 750 ml to 1000 ml. I am short of breath and would like a CXR.     Are you weighing yourself daily and has it been stable?. I was 129.6 at discharge and 128.8 today.     PAIN  How is your pain, what are you doing for your pain? My pain is not bad except this squeezing feeling in my leg. Tylenol and muscle relaxer do not help. I would like an ultrasound of my leg     Are you still doing sternal precautions? yesw   Do you hear any clicking when you are moving or taking a deep breath? No     INCISION:   It looks good, no redness or drainage.     ASSISTANCE  Do you have someone at home to help you with your daily activities and transportation needs? My .       MEDICATIONS  I would like to review your discharge medications and answer any questions you may have.   Reviewed     Are you on a blood thinner/Coumadin  No    Who is managing your Coumadin dosing/INR? No       FOLLOW UP       Scheduled for cardiac rehab? 9/15  Scheduled to see our PA or Surgeon? 9/22  Scheduled to see your cardiologist ? Dr vargas   Scheduled to see ARMANI/Core na  Scheduled to see your primary care physician? Dr. Maria 9/16   Do you have our contact information? Yes

## 2022-09-12 NOTE — TELEPHONE ENCOUNTER
Updates patient on lab,CXR and Venous ultrasound results. Encouraged IS use, laxative to help empty her bowel, stool and air noted on CXR. Compression socks for hematoma vs seroma may help with discomfort. Tylenol scheduled and Methocarbamol may help. Offered her an earlier clinic appt if she feels like she is not improving.

## 2022-09-13 ENCOUNTER — MEDICAL CORRESPONDENCE (OUTPATIENT)
Dept: HEALTH INFORMATION MANAGEMENT | Facility: CLINIC | Age: 72
End: 2022-09-13

## 2022-09-14 ENCOUNTER — TELEPHONE (OUTPATIENT)
Dept: INTERNAL MEDICINE | Facility: CLINIC | Age: 72
End: 2022-09-14

## 2022-09-14 NOTE — TELEPHONE ENCOUNTER
Griselda from [facility] calling for verbal orders for PT  2 visits a week for 2 weeks  1 visit a week for 2 weeks  Ok to call and lm [phone]

## 2022-09-16 ENCOUNTER — OFFICE VISIT (OUTPATIENT)
Dept: INTERNAL MEDICINE | Facility: CLINIC | Age: 72
End: 2022-09-16
Payer: COMMERCIAL

## 2022-09-16 VITALS
HEART RATE: 100 BPM | OXYGEN SATURATION: 99 % | HEIGHT: 59 IN | DIASTOLIC BLOOD PRESSURE: 68 MMHG | BODY MASS INDEX: 25.6 KG/M2 | RESPIRATION RATE: 22 BRPM | SYSTOLIC BLOOD PRESSURE: 120 MMHG | TEMPERATURE: 98.3 F | WEIGHT: 127 LBS

## 2022-09-16 DIAGNOSIS — Z95.1 S/P CABG (CORONARY ARTERY BYPASS GRAFT): Primary | ICD-10-CM

## 2022-09-16 PROCEDURE — 99213 OFFICE O/P EST LOW 20 MIN: CPT | Performed by: NURSE PRACTITIONER

## 2022-09-16 ASSESSMENT — ASTHMA QUESTIONNAIRES
QUESTION_2 LAST FOUR WEEKS HOW OFTEN HAVE YOU HAD SHORTNESS OF BREATH: MORE THAN ONCE A DAY
QUESTION_1 LAST FOUR WEEKS HOW MUCH OF THE TIME DID YOUR ASTHMA KEEP YOU FROM GETTING AS MUCH DONE AT WORK, SCHOOL OR AT HOME: MOST OF THE TIME

## 2022-09-16 NOTE — NURSING NOTE
"Chief Complaint   Patient presents with     Hospital F/U     initial /68   Pulse 100   Temp 98.3  F (36.8  C) (Oral)   Resp 22   Ht 1.499 m (4' 11\")   Wt 57.6 kg (127 lb)   LMP  (LMP Unknown)   SpO2 99%   BMI 25.65 kg/m   Estimated body mass index is 25.65 kg/m  as calculated from the following:    Height as of this encounter: 1.499 m (4' 11\").    Weight as of this encounter: 57.6 kg (127 lb)..  bp completed using cuff size regular  DAYNE CELESTIN LPN  "

## 2022-09-16 NOTE — PROGRESS NOTES
Assessment & Plan     S/P CABG (coronary artery bypass graft)  Stable  May not do homecare if added cost  Seeing cards next week   Doing well   Form completed for travel she missed - was supposed to go [facility]  - Home Care Referral      40 minutes spent on the date of the encounter doing chart review, history and exam, documentation and further activities per the note       Patient Instructions   Home care ordered     Dr Diogo lucia   December 28/2022 at 1040 am       No follow-ups on file.    CARL Mcarthur CNP  [facility] YAYO Uriarte is a 71 year old, presenting for the following health issues:  Hospital follow up    HPI       Hospital Follow-up Visit:    Hospital/Nursing Home/IP Rehab Facility: [facility]  Date of Admission: 8-31-22  Date of Discharge: 9-9-22  Reason(s) for Admission: coronary artery bypass graft    Was your hospitalization related to COVID-19? No   Problems taking medications regularly:  None  Medication changes since discharge: None  Problems adhering to non-medication therapy:  None    Summary of hospitalization:  [facility] discharge summary reviewed  Diagnostic Tests/Treatments reviewed.  Follow up needed: cardiology   Other Healthcare Providers Involved in Patient s Care:         Specialist appointment - cardiology   Update since discharge: improved.   Post Medication Reconciliation Status:        Plan of care communicated with patient     Takes tylenol PM at night for sleep   Incision  Intact - just taking tylenol - no pain medication     Due for cardiology next week     Passed out when she took metoprolol at first - tolerating this medication now     advair - she usually has allergy induced issues with her lungs and uses now but not all year long     Weight has drifted down since hospital    Chest x ray showed effusion mild   No change       Review of Systems   Constitutional, HEENT,  cardiovascular, pulmonary, GI, , musculoskeletal, neuro, skin, endocrine and psych systems are negative, except as otherwise noted.      Objective    LMP  (LMP Unknown)   There is no height or weight on file to calculate BMI.  Physical Exam   GENERAL: alert and no distress  RESP: lungs clear to auscultation - no rales, rhonchi or wheezes  CV: regular rate and rhythm  ABDOMEN: soft, nontender,  and bowel sounds normal  MS: no gross musculoskeletal defects noted, no edema  SKIN: incision clean dry and intact   PSYCH: ntation appears normal, affect normal/bright    Reviewed results

## 2022-09-21 ENCOUNTER — TELEPHONE (OUTPATIENT)
Dept: INTERNAL MEDICINE | Facility: CLINIC | Age: 72
End: 2022-09-21

## 2022-09-21 NOTE — TELEPHONE ENCOUNTER
9/13-11/11 HOME HEALTH CERTIFICATION; received via fax. Orders/Forms in your mailbox to be signed.

## 2022-09-22 ENCOUNTER — OFFICE VISIT (OUTPATIENT)
Dept: CARDIOLOGY | Facility: CLINIC | Age: 72
End: 2022-09-22
Payer: COMMERCIAL

## 2022-09-22 ENCOUNTER — HOSPITAL ENCOUNTER (OUTPATIENT)
Dept: GENERAL RADIOLOGY | Facility: CLINIC | Age: 72
Discharge: HOME | End: 2022-09-22
Attending: PHYSICIAN ASSISTANT | Admitting: PHYSICIAN ASSISTANT
Payer: COMMERCIAL

## 2022-09-22 VITALS
HEIGHT: 59 IN | OXYGEN SATURATION: 97 % | DIASTOLIC BLOOD PRESSURE: 72 MMHG | WEIGHT: 130.2 LBS | BODY MASS INDEX: 26.25 KG/M2 | SYSTOLIC BLOOD PRESSURE: 126 MMHG | HEART RATE: 97 BPM

## 2022-09-22 DIAGNOSIS — Z95.1 S/P CABG (CORONARY ARTERY BYPASS GRAFT): ICD-10-CM

## 2022-09-22 DIAGNOSIS — Z95.1 S/P CABG (CORONARY ARTERY BYPASS GRAFT): Primary | ICD-10-CM

## 2022-09-22 PROCEDURE — 71046 X-RAY EXAM CHEST 2 VIEWS: CPT

## 2022-09-22 PROCEDURE — 99024 POSTOP FOLLOW-UP VISIT: CPT | Performed by: PHYSICIAN ASSISTANT

## 2022-09-22 NOTE — PATIENT INSTRUCTIONS
Your surgery was on 9/2/22    Ok to start driving on 9/30/22 as long as you are no longer taking narcotic pain medications.    No lifting greater than 10 pounds until 10/28/22, then    No lifting greater than 20-30 pounds until 11/25/22    Do not soak your incisions until fully healed over with no scabbing; this includes hot tubs, baths, pools etc.    If you have questions or concerns, please call us:    Sylvia Corrigan  [phone]     Send travel paper work:  [email or web address] and [email or web address]

## 2022-09-22 NOTE — PROGRESS NOTES
"CARDIOTHORACIC SURGERY FOLLOW-UP VISIT     Kalani Rowan   1950   [id number]      Reason for visit: Post-Op CABG with Dr. Berto Tsai on 09/02/2022    HPI:   Kalani Rowan is a 71 year old year old female seen in clinic for follow-up appointment after surgery. Patient has past medical history of severe CAD, HLD, HTN. Hospital course was unremarkable. Patient was discharged to home with rehab on 09/09/22.    Patient having some shortness of breath. CXR showed atelectasias and small pleural effusion. Patient has temps up to 99.5 but under 100F at nighttime.     Patient denies popping or clicking of the sternum. Reports incisions are well healing, no erythema tenderness, or drainage. Denies fevers or chills. No chest pain, SOB, LE edema, orthopnea, or PND. No palpitations, dizziness, or lightheadedness.       PAST MEDICAL HISTORY:  Past Medical History:   Diagnosis Date     Allergy, unspecified not elsewhere classified        PAST SURGICAL HISTORY:  Past Surgical History:   Procedure Laterality Date     BYPASS GRAFT ARTERY CORONARY N/A 9/2/2022    Procedure: CORONARY ARTERY BYPASS GRAFT x 3 (LEFT INTERNAL MAMMARY ARTERY - LEFT ANTERIOR DESCENDING ARTERY; SAPHENOUS VEIN - OBTUSE MARGINAL ARTERY; SAPHENOUS VEIN - POSTERIOR DESCENDING ARTERY) WITH ENDOSCOPIC SAPHENOUS VEIN HARVEST ON BILATERAL LOWER EXTREMITY, AND ON CARDIOPULMONARY PUMP OXYGENATOR (INTRAOPERATIVE TRANSESOPHAGEAL ECHOCARDIOGRAM BY ANESTHESIOLOGIST);  Surgeon: Berto Tsai     CV CORONARY ANGIOGRAM N/A 9/1/2022    Procedure: Coronary Angiogram;  Surgeon: Mao Anguiano MD;  Location:  HEART CARDIAC CATH LAB     CV LEFT HEART CATH N/A 9/1/2022    Procedure: Left Heart Catheterization;  Surgeon: Mao Anguiano MD;  Location:  HEART CARDIAC CATH LAB     [facility] NONSPECIFIC PROCEDURE      S/P Right Tracheotomy Age 6 in [place] \"choking on nuts\"     Z NONSPECIFIC PROCEDURE      Left Foot Cyst       CURRENT MEDICATIONS:   Current " Outpatient Medications   Medication     acetaminophen (TYLENOL) 325 MG tablet     albuterol (PROAIR HFA) 108 (90 Base) MCG/ACT inhaler     aspirin (ASA) 81 MG chewable tablet     atorvastatin (LIPITOR) 20 MG tablet     famotidine (PEPCID) 20 MG tablet     fluticasone-salmeterol (ADVAIR-HFA) 230-21 MCG/ACT inhaler     methocarbamol (ROBAXIN) 500 MG tablet     metoprolol succinate ER (TOPROL XL) 25 MG 24 hr tablet     polyethylene glycol (MIRALAX) 17 GM/Dose powder     senna-docusate (SENOKOT-S/PERICOLACE) 8.6-50 MG tablet     traMADol (ULTRAM) 50 MG tablet     No current facility-administered medications for this visit.       ALLERGIES:      Allergies   Allergen Reactions     Elderberry [Sambucus Canadensis] Anaphylaxis     Elderberry syrup- ER.      Codeine      [place] Nite Time Itching     Other Environmental Allergy      Pine sol causes anylphylaxis         ROS:  Review of symptoms otherwise negative unless commented about in HPI.     LABS:  Last Basic Metabolic Panel:  Lab Results   Component Value Date     09/12/2022     11/02/2019      Lab Results   Component Value Date    POTASSIUM 4.4 09/12/2022    POTASSIUM 4.2 09/09/2022    POTASSIUM 3.6 11/02/2019     Lab Results   Component Value Date    CHLORIDE 103 09/12/2022    CHLORIDE 111 09/09/2022    CHLORIDE 106 11/02/2019     Lab Results   Component Value Date    APRIL 9.5 09/12/2022    APRIL 9.6 11/02/2019     Lab Results   Component Value Date    CO2 22 09/12/2022    CO2 25 09/09/2022    CO2 23 11/02/2019     Lab Results   Component Value Date    BUN 13.3 09/12/2022    BUN 10 09/09/2022    BUN 19 11/02/2019     Lab Results   Component Value Date    CR 0.70 09/12/2022    CR 0.65 11/02/2019     Lab Results   Component Value Date     09/12/2022     09/09/2022     11/02/2019       Last CBC:   Lab Results   Component Value Date    WBC 8.9 09/12/2022    WBC 6.9 11/02/2019     Lab Results   Component Value Date    RBC 3.44 09/12/2022    RBC  "5.41 11/02/2019     Lab Results   Component Value Date    HGB 10.4 09/12/2022    HGB 16.6 11/02/2019     Lab Results   Component Value Date    HCT 32.9 09/12/2022    HCT 47.2 11/02/2019     No components found for: MCT  Lab Results   Component Value Date    MCV 96 09/12/2022    MCV 87 11/02/2019     Lab Results   Component Value Date    MCH 30.2 09/12/2022    MCH 30.7 11/02/2019     Lab Results   Component Value Date    MCHC 31.6 09/12/2022    MCHC 35.2 11/02/2019     Lab Results   Component Value Date    RDW 16.9 09/12/2022    RDW 13.3 11/02/2019     Lab Results   Component Value Date     09/12/2022     11/02/2019       INR:  Lab Results   Component Value Date    INR 1.26 09/02/2022    INR 1.55 09/02/2022    INR 0.88 11/02/2019         IMAGING:  None    PHYSICAL EXAM:   LMP  (LMP Unknown)      /72   Pulse 97   Ht 1.5 m (4' 11.06\")   Wt 59.1 kg (130 lb 3.2 oz)   LMP  (LMP Unknown)   SpO2 97%   BMI 26.25 kg/m      General: alert and oriented x 3, pleasant, no acute distress, normal mood and affect  Neuro: no focal deficits   CV: S1 S2, no murmurs, rubs or gallops, regular rate and rhythm, no peripheral edema  Pulm: bilateral breath sounds, clear to auscultation, easy work of breathing  Incision: incisions clean dry and intact without erythema, swelling or drainage       ASSESSMENT/PLAN:  Kalani Rowan is a 71 year old year old female status post CABG who returns to clinic for postop visit. All questions answered today and patient satisfied.     1. Surgically doing well overall.  Incisions are healing well with no signs of infection. Increasing activity and strength overall.   2. Hemodynamics are stable. No medication changes were needed today.  3. Follow up with cardiology as scheduled, 12/13/22.   4. Follow up with PCP as scheduled.  5. Continue Outpatient Cardiac Rehab until completed.   6. Continue sternal precautions for 12 weeks from surgery date, 11/25/22.   7. No driving for 4 weeks " from surgery date, 9/30/22.   8. Ordered new CXR 2V today for patient, will call with results.   9. Patient to drop off travel paperwork tomorrow, 9/23/22, to be filled out. Can also send electronically to emails listed on AVS.     Uma Jules PA-C   Cardiothoracic Surgery  Pager [phone]      CC  Lucia Lind

## 2022-09-26 DIAGNOSIS — Z53.9 DIAGNOSIS NOT YET DEFINED: Primary | ICD-10-CM

## 2022-09-26 PROCEDURE — G0180 MD CERTIFICATION HHA PATIENT: HCPCS | Performed by: INTERNAL MEDICINE

## 2022-10-29 DIAGNOSIS — Z95.1 S/P CABG (CORONARY ARTERY BYPASS GRAFT): ICD-10-CM

## 2022-12-13 ENCOUNTER — LAB (OUTPATIENT)
Dept: LAB | Facility: CLINIC | Age: 72
End: 2022-12-13
Payer: COMMERCIAL

## 2022-12-13 ENCOUNTER — OFFICE VISIT (OUTPATIENT)
Dept: CARDIOLOGY | Facility: CLINIC | Age: 72
End: 2022-12-13
Payer: COMMERCIAL

## 2022-12-13 VITALS
SYSTOLIC BLOOD PRESSURE: 118 MMHG | HEART RATE: 79 BPM | HEIGHT: 59 IN | DIASTOLIC BLOOD PRESSURE: 73 MMHG | BODY MASS INDEX: 27.24 KG/M2 | WEIGHT: 135.1 LBS

## 2022-12-13 DIAGNOSIS — I25.10 CAD (CORONARY ARTERY DISEASE): ICD-10-CM

## 2022-12-13 DIAGNOSIS — E78.5 HYPERLIPIDEMIA LDL GOAL <130: Primary | ICD-10-CM

## 2022-12-13 DIAGNOSIS — R22.42 LOCALIZED SWELLING, MASS AND LUMP, LEFT LOWER LIMB: ICD-10-CM

## 2022-12-13 DIAGNOSIS — I10 ESSENTIAL (PRIMARY) HYPERTENSION: ICD-10-CM

## 2022-12-13 DIAGNOSIS — Z95.1 S/P CABG (CORONARY ARTERY BYPASS GRAFT): ICD-10-CM

## 2022-12-13 DIAGNOSIS — E78.00 HYPERCHOLESTEREMIA: ICD-10-CM

## 2022-12-13 DIAGNOSIS — E78.5 HYPERLIPIDEMIA LDL GOAL <130: ICD-10-CM

## 2022-12-13 DIAGNOSIS — I25.728 ATHEROSCLEROSIS OF AUTOLOGOUS ARTERY CORONARY ARTERY BYPASS GRAFT(S) WITH OTHER FORMS OF ANGINA PECTORIS (H): ICD-10-CM

## 2022-12-13 DIAGNOSIS — Z95.1 S/P CABG (CORONARY ARTERY BYPASS GRAFT): Primary | ICD-10-CM

## 2022-12-13 LAB
ALT SERPL W P-5'-P-CCNC: 41 U/L (ref 0–50)
ANION GAP SERPL CALCULATED.3IONS-SCNC: 7 MMOL/L (ref 3–14)
BASOPHILS # BLD AUTO: 0 10E3/UL (ref 0–0.2)
BASOPHILS NFR BLD AUTO: 1 %
BUN SERPL-MCNC: 17 MG/DL (ref 7–30)
CALCIUM SERPL-MCNC: 9.3 MG/DL (ref 8.5–10.1)
CHLORIDE BLD-SCNC: 108 MMOL/L (ref 94–109)
CHOLEST SERPL-MCNC: 188 MG/DL
CO2 SERPL-SCNC: 26 MMOL/L (ref 20–32)
CREAT SERPL-MCNC: 0.67 MG/DL (ref 0.52–1.04)
EOSINOPHIL # BLD AUTO: 0.3 10E3/UL (ref 0–0.7)
EOSINOPHIL NFR BLD AUTO: 4 %
ERYTHROCYTE [DISTWIDTH] IN BLOOD BY AUTOMATED COUNT: 13.4 % (ref 10–15)
FASTING STATUS PATIENT QL REPORTED: YES
GFR SERPL CREATININE-BSD FRML MDRD: >90 ML/MIN/1.73M2
GLUCOSE BLD-MCNC: 107 MG/DL (ref 70–99)
HCT VFR BLD AUTO: 45.4 % (ref 35–47)
HDLC SERPL-MCNC: 42 MG/DL
HGB BLD-MCNC: 14.5 G/DL (ref 11.7–15.7)
IMM GRANULOCYTES # BLD: 0 10E3/UL
IMM GRANULOCYTES NFR BLD: 0 %
LDLC SERPL CALC-MCNC: 95 MG/DL
LYMPHOCYTES # BLD AUTO: 1.7 10E3/UL (ref 0.8–5.3)
LYMPHOCYTES NFR BLD AUTO: 28 %
MCH RBC QN AUTO: 27.6 PG (ref 26.5–33)
MCHC RBC AUTO-ENTMCNC: 31.9 G/DL (ref 31.5–36.5)
MCV RBC AUTO: 87 FL (ref 78–100)
MONOCYTES # BLD AUTO: 0.4 10E3/UL (ref 0–1.3)
MONOCYTES NFR BLD AUTO: 7 %
NEUTROPHILS # BLD AUTO: 3.6 10E3/UL (ref 1.6–8.3)
NEUTROPHILS NFR BLD AUTO: 60 %
NONHDLC SERPL-MCNC: 146 MG/DL
NRBC # BLD AUTO: 0 10E3/UL
NRBC BLD AUTO-RTO: 0 /100
PLATELET # BLD AUTO: 281 10E3/UL (ref 150–450)
POTASSIUM BLD-SCNC: 4.2 MMOL/L (ref 3.4–5.3)
RBC # BLD AUTO: 5.25 10E6/UL (ref 3.8–5.2)
SODIUM SERPL-SCNC: 141 MMOL/L (ref 133–144)
T4 FREE SERPL-MCNC: 0.87 NG/DL (ref 0.76–1.46)
TRIGL SERPL-MCNC: 253 MG/DL
TSH SERPL DL<=0.005 MIU/L-ACNC: 7.62 MU/L (ref 0.4–4)
WBC # BLD AUTO: 6 10E3/UL (ref 4–11)

## 2022-12-13 PROCEDURE — 84443 ASSAY THYROID STIM HORMONE: CPT | Performed by: INTERNAL MEDICINE

## 2022-12-13 PROCEDURE — 80048 BASIC METABOLIC PNL TOTAL CA: CPT | Performed by: INTERNAL MEDICINE

## 2022-12-13 PROCEDURE — 84460 ALANINE AMINO (ALT) (SGPT): CPT | Performed by: INTERNAL MEDICINE

## 2022-12-13 PROCEDURE — 36415 COLL VENOUS BLD VENIPUNCTURE: CPT | Performed by: INTERNAL MEDICINE

## 2022-12-13 PROCEDURE — 80061 LIPID PANEL: CPT | Performed by: INTERNAL MEDICINE

## 2022-12-13 PROCEDURE — 84439 ASSAY OF FREE THYROXINE: CPT | Performed by: INTERNAL MEDICINE

## 2022-12-13 PROCEDURE — 99215 OFFICE O/P EST HI 40 MIN: CPT | Performed by: INTERNAL MEDICINE

## 2022-12-13 PROCEDURE — 85025 COMPLETE CBC W/AUTO DIFF WBC: CPT | Performed by: INTERNAL MEDICINE

## 2022-12-13 RX ORDER — METOPROLOL SUCCINATE 25 MG/1
12.5 TABLET, EXTENDED RELEASE ORAL DAILY
Qty: 45 TABLET | Refills: 3 | Status: CANCELLED | OUTPATIENT
Start: 2022-12-13

## 2022-12-13 RX ORDER — VITAMIN B COMPLEX
TABLET ORAL DAILY
COMMUNITY
End: 2023-04-07

## 2022-12-13 RX ORDER — MULTIPLE VITAMINS W/ MINERALS TAB 9MG-400MCG
1 TAB ORAL DAILY
COMMUNITY

## 2022-12-13 RX ORDER — CARVEDILOL 6.25 MG/1
6.25 TABLET ORAL 2 TIMES DAILY WITH MEALS
Qty: 180 TABLET | Refills: 3 | Status: SHIPPED | OUTPATIENT
Start: 2022-12-13 | End: 2023-03-09

## 2022-12-13 RX ORDER — ASPIRIN 81 MG/1
162 TABLET, CHEWABLE ORAL DAILY
Qty: 180 TABLET | Refills: 3 | Status: SHIPPED | OUTPATIENT
Start: 2022-12-13 | End: 2022-12-28

## 2022-12-13 RX ORDER — NITROGLYCERIN 0.4 MG/1
TABLET SUBLINGUAL
Qty: 20 TABLET | Refills: 3 | Status: SHIPPED | OUTPATIENT
Start: 2022-12-13

## 2022-12-13 RX ORDER — ATORVASTATIN CALCIUM 40 MG/1
40 TABLET, FILM COATED ORAL EVERY EVENING
Qty: 90 TABLET | Refills: 3 | Status: SHIPPED | OUTPATIENT
Start: 2022-12-13 | End: 2023-07-18

## 2022-12-13 NOTE — LETTER
12/13/2022    Lucia Lind MD  [address]  [facility] [address]    RE: Kalani Chiango       Dear Colleague,     I had the pleasure of seeing Kalani Rowan in the [facility].  Cardiology Progress Note          Assessment and Plan:       1. Ischemic cardiomyopathy and three-vessel disease status post CABG    Refilled medications.  Changed her metoprolol to carvedilol in case it is contributing to her fatigue.    Increase her atorvastatin to 40 mg, if more fatigue or mood disorder, can change to rosuvastatin.    Repeat echocardiogram to see if LV recovery.      2. Hypertension, labile.  Salt sensitive    May consider intermittent diuretic if needed for post dietary salt hypertension    Labs today      3. Left lower extremity swelling    Duplex ultrasound to rule out DVT per patient's  request    May consider venous competency testing if symptoms progress over the next few months.    Follow-up in 6 months in [place] per patient request        40 minutes was spent with the patient, precharting and reviewing tests as well as post visit charting all done today..    This note was transcribed using electronic voice recognition software and there may be typographical errors present.                    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  She presented with NSTEMI when I saw her in September 2022.  Coronary angiogram showed three-vessel disease and she underwent CABG.  She had bilateral saphenous vein harvest.  She has left lower extremity swelling and tenderness lateral to her harvest site that may represent varicosity.    She has atypical chest discomfort intermittently.  She has spikes of hypertension when she eats salty foods like soy sauce with sushi.    She denies any dyspnea on exertion.  She feels a little bit fatigued.  She is only on metoprolol and atorvastatin.  No  "myalgias.    Ejection fraction was 45% with apical hypokinesis at the time of her presentation.  We have not done follow-up echocardiogram or lipids.  She is currently on atorvastatin 20 mg without side effects.                     Review of Systems:     Review of Systems:  Skin:        Eyes:       ENT:       Respiratory:       Cardiovascular:       Gastroenterology:      Genitourinary:       Musculoskeletal:       Neurologic:       Psychiatric:       Heme/Lymph/Imm:       Endocrine:                   Physical Exam:     Vitals: /73   Pulse 79   Ht 1.499 m (4' 11\")   Wt 61.3 kg (135 lb 1.6 oz)   LMP  (LMP Unknown)   BMI 27.29 kg/m    Constitutional:  cooperative, alert and oriented, well developed, well nourished, in no acute distress        Skin:  warm and dry to the touch, no apparent skin lesions or masses noted incision healing well      Head:  normocephalic, no masses or lesions        Eyes:  pupils equal and round, conjunctivae and lids unremarkable, sclera white, no xanthalasma, EOMS intact, no nystagmus        Chest:  normal symmetry        Cardiac: regular rhythm;normal S1 and S2     no presence of murmur            Extremities and Back:  no deformities, clubbing, cyanosis, erythema observed        Neurological:  no gross motor deficits;affect appropriate                 Medications:     Current Outpatient Medications   Medication Sig Dispense Refill     albuterol (PROAIR HFA) 108 (90 Base) MCG/ACT inhaler Inhale 1-2 puffs into the lungs every 6 hours as needed for shortness of breath / dyspnea 18 g 4     aspirin (ASA) 81 MG chewable tablet 2 tablets (162 mg) by Oral or NG Tube route daily 180 tablet 3     atorvastatin (LIPITOR) 40 MG tablet Take 1 tablet (40 mg) by mouth every evening 90 tablet 3     carvedilol (COREG) 6.25 MG tablet Take 1 tablet (6.25 mg) by mouth 2 times daily (with meals) 180 tablet 3     methocarbamol (ROBAXIN) 500 MG tablet Take 1 tablet (500 mg) by mouth every 6 hours as " needed for muscle spasms 40 tablet 1     multivitamin w/minerals (MULTI-VITAMIN) tablet Take 1 tablet by mouth daily       nitroGLYcerin (NITROSTAT) 0.4 MG sublingual tablet For chest pain place 1 tablet under the tongue every 5 minutes for 3 doses. If symptoms persist 5 minutes after 1st dose call 911. 20 tablet 3     polyethylene glycol (MIRALAX) 17 GM/Dose powder Take 17 g by mouth daily as needed for constipation 510 g 0     senna-docusate (SENOKOT-S/PERICOLACE) 8.6-50 MG tablet Take 1 tablet by mouth 2 times daily 30 tablet 0     traMADol (ULTRAM) 50 MG tablet Take 1 tablet (50 mg) by mouth every 6 hours as needed for moderate pain 20 tablet 0     Vitamin D3 (CHOLECALCIFEROL) 25 mcg (1000 units) tablet Take by mouth daily       acetaminophen (TYLENOL) 325 MG tablet Take 2 tablets (650 mg) by mouth every 6 hours as needed for other (For optimal non-opioid multimodal pain management to improve pain control.) 100 tablet 0     fluticasone-salmeterol (ADVAIR-HFA) 230-21 MCG/ACT inhaler Inhale 2 puffs into the lungs 2 times daily (Patient not taking: Reported on 12/13/2022) 12 g 3                Data:   All laboratory data reviewed  No results found for this or any previous visit (from the past 24 hour(s)).    All laboratory data reviewed  Lab Results   Component Value Date    CHOL 235 09/12/2022    CHOL 377 11/02/2019     Lab Results   Component Value Date    HDL 42 09/12/2022    HDL 54 11/02/2019     Lab Results   Component Value Date     09/12/2022    LDL  11/02/2019     Cannot estimate LDL when triglyceride exceeds 400 mg/dL     11/02/2019     Lab Results   Component Value Date    TRIG 323 09/12/2022    TRIG 547 11/02/2019     Lab Results   Component Value Date    CHOLHDLRATIO 7.4 11/18/2013     TSH   Date Value Ref Range Status   09/12/2022 3.94 0.30 - 4.20 uIU/mL Final   11/02/2019 3.94 0.40 - 4.00 mU/L Final     Last Basic Metabolic Panel:  Lab Results   Component Value Date     09/12/2022      11/02/2019      Lab Results   Component Value Date    POTASSIUM 4.4 09/12/2022    POTASSIUM 4.2 09/09/2022    POTASSIUM 3.6 11/02/2019     Lab Results   Component Value Date    CHLORIDE 103 09/12/2022    CHLORIDE 111 09/09/2022    CHLORIDE 106 11/02/2019     Lab Results   Component Value Date    APRIL 9.5 09/12/2022    APRIL 9.6 11/02/2019     Lab Results   Component Value Date    CO2 22 09/12/2022    CO2 25 09/09/2022    CO2 23 11/02/2019     Lab Results   Component Value Date    BUN 13.3 09/12/2022    BUN 10 09/09/2022    BUN 19 11/02/2019     Lab Results   Component Value Date    CR 0.70 09/12/2022    CR 0.65 11/02/2019     Lab Results   Component Value Date     09/12/2022     09/09/2022     11/02/2019     Lab Results   Component Value Date    WBC 8.9 09/12/2022    WBC 6.9 11/02/2019     Lab Results   Component Value Date    RBC 3.44 09/12/2022    RBC 5.41 11/02/2019     Lab Results   Component Value Date    HGB 10.4 09/12/2022    HGB 16.6 11/02/2019     Lab Results   Component Value Date    HCT 32.9 09/12/2022    HCT 47.2 11/02/2019     Lab Results   Component Value Date    MCV 96 09/12/2022    MCV 87 11/02/2019     Lab Results   Component Value Date    MCH 30.2 09/12/2022    MCH 30.7 11/02/2019     Lab Results   Component Value Date    MCHC 31.6 09/12/2022    MCHC 35.2 11/02/2019     Lab Results   Component Value Date    RDW 16.9 09/12/2022    RDW 13.3 11/02/2019     Lab Results   Component Value Date     09/12/2022     11/02/2019       Thank you for allowing me to participate in the care of your patient.      Sincerely,     Ever Villalobos MD     [facility]  cc:   No referring provider defined for this encounter.

## 2022-12-13 NOTE — PROGRESS NOTES
Cardiology Progress Note          Assessment and Plan:       1. Ischemic cardiomyopathy and three-vessel disease status post CABG    Refilled medications.  Changed her metoprolol to carvedilol in case it is contributing to her fatigue.    Increase her atorvastatin to 40 mg, if more fatigue or mood disorder, can change to rosuvastatin.    Repeat echocardiogram to see if LV recovery.      2. Hypertension, labile.  Salt sensitive    May consider intermittent diuretic if needed for post dietary salt hypertension    Labs today      3. Left lower extremity swelling    Duplex ultrasound to rule out DVT per patient's  request    May consider venous competency testing if symptoms progress over the next few months.    Follow-up in 6 months in [place] per patient request        40 minutes was spent with the patient, precharting and reviewing tests as well as post visit charting all done today..    This note was transcribed using electronic voice recognition software and there may be typographical errors present.                    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  She presented with NSTEMI when I saw her in September 2022.  Coronary angiogram showed three-vessel disease and she underwent CABG.  She had bilateral saphenous vein harvest.  She has left lower extremity swelling and tenderness lateral to her harvest site that may represent varicosity.    She has atypical chest discomfort intermittently.  She has spikes of hypertension when she eats salty foods like soy sauce with sushi.    She denies any dyspnea on exertion.  She feels a little bit fatigued.  She is only on metoprolol and atorvastatin.  No myalgias.    Ejection fraction was 45% with apical hypokinesis at the time of her presentation.  We have not done follow-up echocardiogram or lipids.  She is currently on atorvastatin 20 mg without side effects.             "         Review of Systems:     Review of Systems:  Skin:        Eyes:       ENT:       Respiratory:       Cardiovascular:       Gastroenterology:      Genitourinary:       Musculoskeletal:       Neurologic:       Psychiatric:       Heme/Lymph/Imm:       Endocrine:                   Physical Exam:     Vitals: /73   Pulse 79   Ht 1.499 m (4' 11\")   Wt 61.3 kg (135 lb 1.6 oz)   LMP  (LMP Unknown)   BMI 27.29 kg/m    Constitutional:  cooperative, alert and oriented, well developed, well nourished, in no acute distress        Skin:  warm and dry to the touch, no apparent skin lesions or masses noted incision healing well      Head:  normocephalic, no masses or lesions        Eyes:  pupils equal and round, conjunctivae and lids unremarkable, sclera white, no xanthalasma, EOMS intact, no nystagmus        Chest:  normal symmetry        Cardiac: regular rhythm;normal S1 and S2     no presence of murmur            Extremities and Back:  no deformities, clubbing, cyanosis, erythema observed        Neurological:  no gross motor deficits;affect appropriate                 Medications:     Current Outpatient Medications   Medication Sig Dispense Refill     albuterol (PROAIR HFA) 108 (90 Base) MCG/ACT inhaler Inhale 1-2 puffs into the lungs every 6 hours as needed for shortness of breath / dyspnea 18 g 4     aspirin (ASA) 81 MG chewable tablet 2 tablets (162 mg) by Oral or NG Tube route daily 180 tablet 3     atorvastatin (LIPITOR) 40 MG tablet Take 1 tablet (40 mg) by mouth every evening 90 tablet 3     carvedilol (COREG) 6.25 MG tablet Take 1 tablet (6.25 mg) by mouth 2 times daily (with meals) 180 tablet 3     methocarbamol (ROBAXIN) 500 MG tablet Take 1 tablet (500 mg) by mouth every 6 hours as needed for muscle spasms 40 tablet 1     multivitamin w/minerals (MULTI-VITAMIN) tablet Take 1 tablet by mouth daily       nitroGLYcerin (NITROSTAT) 0.4 MG sublingual tablet For chest pain place 1 tablet under the tongue " every 5 minutes for 3 doses. If symptoms persist 5 minutes after 1st dose call 911. 20 tablet 3     polyethylene glycol (MIRALAX) 17 GM/Dose powder Take 17 g by mouth daily as needed for constipation 510 g 0     senna-docusate (SENOKOT-S/PERICOLACE) 8.6-50 MG tablet Take 1 tablet by mouth 2 times daily 30 tablet 0     traMADol (ULTRAM) 50 MG tablet Take 1 tablet (50 mg) by mouth every 6 hours as needed for moderate pain 20 tablet 0     Vitamin D3 (CHOLECALCIFEROL) 25 mcg (1000 units) tablet Take by mouth daily       acetaminophen (TYLENOL) 325 MG tablet Take 2 tablets (650 mg) by mouth every 6 hours as needed for other (For optimal non-opioid multimodal pain management to improve pain control.) 100 tablet 0     fluticasone-salmeterol (ADVAIR-HFA) 230-21 MCG/ACT inhaler Inhale 2 puffs into the lungs 2 times daily (Patient not taking: Reported on 12/13/2022) 12 g 3                Data:   All laboratory data reviewed  No results found for this or any previous visit (from the past 24 hour(s)).    All laboratory data reviewed  Lab Results   Component Value Date    CHOL 235 09/12/2022    CHOL 377 11/02/2019     Lab Results   Component Value Date    HDL 42 09/12/2022    HDL 54 11/02/2019     Lab Results   Component Value Date     09/12/2022    LDL  11/02/2019     Cannot estimate LDL when triglyceride exceeds 400 mg/dL     11/02/2019     Lab Results   Component Value Date    TRIG 323 09/12/2022    TRIG 547 11/02/2019     Lab Results   Component Value Date    CHOLHDLRATIO 7.4 11/18/2013     TSH   Date Value Ref Range Status   09/12/2022 3.94 0.30 - 4.20 uIU/mL Final   11/02/2019 3.94 0.40 - 4.00 mU/L Final     Last Basic Metabolic Panel:  Lab Results   Component Value Date     09/12/2022     11/02/2019      Lab Results   Component Value Date    POTASSIUM 4.4 09/12/2022    POTASSIUM 4.2 09/09/2022    POTASSIUM 3.6 11/02/2019     Lab Results   Component Value Date    CHLORIDE 103 09/12/2022     CHLORIDE 111 09/09/2022    CHLORIDE 106 11/02/2019     Lab Results   Component Value Date    APRIL 9.5 09/12/2022    APRIL 9.6 11/02/2019     Lab Results   Component Value Date    CO2 22 09/12/2022    CO2 25 09/09/2022    CO2 23 11/02/2019     Lab Results   Component Value Date    BUN 13.3 09/12/2022    BUN 10 09/09/2022    BUN 19 11/02/2019     Lab Results   Component Value Date    CR 0.70 09/12/2022    CR 0.65 11/02/2019     Lab Results   Component Value Date     09/12/2022     09/09/2022     11/02/2019     Lab Results   Component Value Date    WBC 8.9 09/12/2022    WBC 6.9 11/02/2019     Lab Results   Component Value Date    RBC 3.44 09/12/2022    RBC 5.41 11/02/2019     Lab Results   Component Value Date    HGB 10.4 09/12/2022    HGB 16.6 11/02/2019     Lab Results   Component Value Date    HCT 32.9 09/12/2022    HCT 47.2 11/02/2019     Lab Results   Component Value Date    MCV 96 09/12/2022    MCV 87 11/02/2019     Lab Results   Component Value Date    MCH 30.2 09/12/2022    MCH 30.7 11/02/2019     Lab Results   Component Value Date    MCHC 31.6 09/12/2022    MCHC 35.2 11/02/2019     Lab Results   Component Value Date    RDW 16.9 09/12/2022    RDW 13.3 11/02/2019     Lab Results   Component Value Date     09/12/2022     11/02/2019

## 2022-12-14 ENCOUNTER — TELEPHONE (OUTPATIENT)
Dept: CARDIOLOGY | Facility: CLINIC | Age: 72
End: 2022-12-14

## 2022-12-14 NOTE — TELEPHONE ENCOUNTER
[facility] Call Center    Phone Message    May a detailed message be left on voicemail: yes     Reason for Call: Other: Patient would like her lab results mailed to her house. Thank you.     Action Taken: Message routed to:  Other: Cardiology    Travel Screening: Not Applicable     Thank you!  [facility]

## 2022-12-18 ENCOUNTER — HEALTH MAINTENANCE LETTER (OUTPATIENT)
Age: 72
End: 2022-12-18

## 2022-12-20 ENCOUNTER — HOSPITAL ENCOUNTER (OUTPATIENT)
Dept: CARDIOLOGY | Facility: CLINIC | Age: 72
Discharge: HOME | End: 2022-12-20
Attending: INTERNAL MEDICINE | Admitting: INTERNAL MEDICINE
Payer: COMMERCIAL

## 2022-12-20 DIAGNOSIS — Z95.1 S/P CABG (CORONARY ARTERY BYPASS GRAFT): ICD-10-CM

## 2022-12-20 DIAGNOSIS — I10 ESSENTIAL (PRIMARY) HYPERTENSION: ICD-10-CM

## 2022-12-20 DIAGNOSIS — E78.00 HYPERCHOLESTEREMIA: ICD-10-CM

## 2022-12-20 DIAGNOSIS — I25.728 ATHEROSCLEROSIS OF AUTOLOGOUS ARTERY CORONARY ARTERY BYPASS GRAFT(S) WITH OTHER FORMS OF ANGINA PECTORIS (H): ICD-10-CM

## 2022-12-20 LAB — LVEF ECHO: NORMAL

## 2022-12-20 PROCEDURE — 93306 TTE W/DOPPLER COMPLETE: CPT

## 2022-12-20 PROCEDURE — 93306 TTE W/DOPPLER COMPLETE: CPT | Mod: 26 | Performed by: INTERNAL MEDICINE

## 2022-12-23 ENCOUNTER — TELEPHONE (OUTPATIENT)
Dept: CARDIOLOGY | Facility: CLINIC | Age: 72
End: 2022-12-23

## 2022-12-23 NOTE — TELEPHONE ENCOUNTER
Results noted. RN will send to Dr. Villalobos for further review to inquire if any changes to medical plan based on results.                           12/20/22 ECHO  Interpretation Summary     The visual ejection fraction is 65-70%.  Left ventricular systolic function is normal.  In one 3 chamber view the anteroseptal wall appeared hypokinetic. In other  views there appears to be septal bounce consistent with conduction abnormality  and no significant hypokinesis. The anterior wall contracts normally and so it  would be unusual to have an isolated anterospetal wall motion abnormality.  In comparison to the previous study, the sizeable apical wall motion  abnormality is not present on this study. The anterospetal wall motion  abnormality mentioned above was also present on the previous study. There has  been significant improvement in overall left ventricular systolic function .  The study was technically difficult.        1213/22 labs  Component      Latest Ref Rng & Units 12/13/2022   WBC      4.0 - 11.0 10e3/uL 6.0   RBC Count      3.80 - 5.20 10e6/uL 5.25 (H)   Hemoglobin      11.7 - 15.7 g/dL 14.5   Hematocrit      35.0 - 47.0 % 45.4   MCV      78 - 100 fL 87   MCH      26.5 - 33.0 pg 27.6   MCHC      31.5 - 36.5 g/dL 31.9   RDW      10.0 - 15.0 % 13.4   Platelet Count      150 - 450 10e3/uL 281   % Neutrophils      % 60   % Lymphocytes      % 28   % Monocytes      % 7   % Eosinophils      % 4   % Basophils      % 1   % Immature Granulocytes      % 0   NRBCs per 100 WBC      <1 /100 0   Absolute Neutrophils      1.6 - 8.3 10e3/uL 3.6   Absolute Lymphocytes      0.8 - 5.3 10e3/uL 1.7   Absolute Monocytes      0.0 - 1.3 10e3/uL 0.4   Absolute Eosinophils      0.0 - 0.7 10e3/uL 0.3   Absolute Basophils      0.0 - 0.2 10e3/uL 0.0   Absolute Immature Granulocytes      <=0.4 10e3/uL 0.0   Absolute NRBCs      10e3/uL 0.0   Sodium      133 - 144 mmol/L 141   Potassium      3.4 - 5.3 mmol/L 4.2   Chloride      94 - 109 mmol/L  108   Carbon Dioxide      20 - 32 mmol/L 26   Anion Gap      3 - 14 mmol/L 7   Urea Nitrogen      7 - 30 mg/dL 17   Creatinine      0.52 - 1.04 mg/dL 0.67   Calcium      8.5 - 10.1 mg/dL 9.3   Glucose      70 - 99 mg/dL 107 (H)   GFR Estimate      >60 mL/min/1.73m2 >90   Cholesterol      <200 mg/dL 188   Triglycerides      <150 mg/dL 253 (H)   HDL Cholesterol      >=50 mg/dL 42 (L)   LDL Cholesterol Calculated      <=100 mg/dL 95   Non HDL Cholesterol      <130 mg/dL 146 (H)   Patient Fasting > 8hrs?       Yes   ALT      0 - 50 U/L 41   TSH      0.40 - 4.00 mU/L 7.62 (H)   T4 Free      0.76 - 1.46 ng/dL 0.87

## 2022-12-23 NOTE — TELEPHONE ENCOUNTER
RN updated patient via [facility].      Very happy that the echocardiogram returned normal function.  The minor abnormality is expected with her coronary artery disease.     Dr. Villalobos

## 2022-12-23 NOTE — TELEPHONE ENCOUNTER
[facility] Call Center    Phone Message    May a detailed message be left on voicemail: yes     Reason for Call: Other: patient called in and needs a call back regarding blood test and Echo.       Action Taken: Other: Cardio    Travel Screening: Not Applicable

## 2022-12-28 ENCOUNTER — OFFICE VISIT (OUTPATIENT)
Dept: INTERNAL MEDICINE | Facility: CLINIC | Age: 72
End: 2022-12-28
Payer: COMMERCIAL

## 2022-12-28 VITALS
OXYGEN SATURATION: 99 % | DIASTOLIC BLOOD PRESSURE: 85 MMHG | HEART RATE: 74 BPM | SYSTOLIC BLOOD PRESSURE: 128 MMHG | WEIGHT: 132 LBS | BODY MASS INDEX: 26.61 KG/M2 | HEIGHT: 59 IN | TEMPERATURE: 97.6 F | RESPIRATION RATE: 16 BRPM

## 2022-12-28 DIAGNOSIS — R94.6 ABNORMAL FINDING ON THYROID FUNCTION TEST: Primary | ICD-10-CM

## 2022-12-28 DIAGNOSIS — Z95.1 S/P CABG (CORONARY ARTERY BYPASS GRAFT): ICD-10-CM

## 2022-12-28 DIAGNOSIS — Z12.31 VISIT FOR SCREENING MAMMOGRAM: ICD-10-CM

## 2022-12-28 DIAGNOSIS — I25.728 ATHEROSCLEROSIS OF AUTOLOGOUS ARTERY CORONARY ARTERY BYPASS GRAFT(S) WITH OTHER FORMS OF ANGINA PECTORIS (H): ICD-10-CM

## 2022-12-28 DIAGNOSIS — E78.00 PURE HYPERCHOLESTEROLEMIA: ICD-10-CM

## 2022-12-28 PROCEDURE — 99214 OFFICE O/P EST MOD 30 MIN: CPT | Performed by: INTERNAL MEDICINE

## 2022-12-28 RX ORDER — ASPIRIN 81 MG/1
162 TABLET, CHEWABLE ORAL EVERY EVENING
Qty: 180 TABLET | Refills: 3 | Status: ON HOLD | COMMUNITY
Start: 2022-12-28 | End: 2023-09-29

## 2022-12-28 RX ORDER — CHLORHEXIDINE/GLYCERIN/HE-CELL
JELLY (GRAM) TOPICAL
COMMUNITY
End: 2023-04-07

## 2022-12-28 RX ORDER — VIT A/VIT C/VIT E/ZINC/COPPER 4296-226
1 CAPSULE ORAL DAILY
COMMUNITY
Start: 2022-12-28

## 2022-12-28 NOTE — PROGRESS NOTES
"  Assessment & Plan     Abnormal finding on thyroid function test  Given 1 TSH of 7 I would like to recheck it in 1 month if not coming down would start her on medication  - TSH with free T4 reflex; Future    Pure hypercholesterolemia  Assured her it is reasonable to increase the Liptior to 40 mg to try and get her LDL closer to 70.   - Comprehensive metabolic panel (BMP + Alb, Alk Phos, ALT, AST, Total. 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    Lucia Lind MD  [facility] a 72 year [facility], presenting for the following health issues:  RECHECK and Heart Problem      HPI     Follow up heart. Only taking Lipitor 20 mg.     Her incisions are well healed. She still feels tired but admits she also feels a lot better than she did 6 weeks ago. She had a normal TSH 2 weeks post op at 3 but recent TSH was up to 7. She has not had prior thyroid issues although I believe her mother had thyroid problems.     Her cardiologist wants to increase her lipitor to 40 mg. Recent LDL was 95. She is not sure she should go up.     She usually gets her Mammogram from her Gynecologist but has had trouble to get in to see them. Would like me to order one.       Review of Systems   Constitutional, HEENT, cardiovascular, pulmonary, GI, , musculoskeletal, neuro, skin, endocrine and psych systems are negative, except as otherwise noted.      Objective    Pulse 74   Resp 16   Ht 1.499 m (4' 11\")   Wt 59.9 kg (132 lb)   LMP  (LMP Unknown)   SpO2 99%   Breastfeeding No   BMI 26.66 kg/m    Body mass index is 26.66 kg/m .  Physical " Exam   GENERAL: healthy, alert and no distress  EYES: Eyes grossly normal to inspection, PERRL and conjunctivae and sclerae normal  NECK: no adenopathy, no asymmetry, masses, or scars and thyroid normal to palpation  RESP: lungs clear to auscultation - no rales, rhonchi or wheezes  CV: regular rate and rhythm, normal S1 S2, no S3 or S4, no murmur, click or rub, no peripheral edema and peripheral pulses strong  ABDOMEN: soft, nontender, no hepatosplenomegaly, no masses and bowel sounds normal  MS: no gross musculoskeletal defects noted, no edema  NEURO: Normal strength and tone, mentation intact and speech normal  PSYCH: mentation appears normal, affect normal/bright

## 2023-01-27 RX ORDER — ATORVASTATIN CALCIUM 20 MG/1
TABLET, FILM COATED ORAL
Qty: 30 TABLET | Refills: 2 | OUTPATIENT
Start: 2023-01-27

## 2023-01-27 RX ORDER — ASPIRIN 81 MG
TABLET,CHEWABLE ORAL
Qty: 60 TABLET | Refills: 2 | OUTPATIENT
Start: 2023-01-27

## 2023-01-30 ENCOUNTER — LAB (OUTPATIENT)
Dept: LAB | Facility: CLINIC | Age: 73
End: 2023-01-30
Payer: COMMERCIAL

## 2023-01-30 DIAGNOSIS — E78.00 PURE HYPERCHOLESTEROLEMIA: ICD-10-CM

## 2023-01-30 DIAGNOSIS — R94.6 ABNORMAL FINDING ON THYROID FUNCTION TEST: ICD-10-CM

## 2023-01-30 LAB
ALBUMIN SERPL BCG-MCNC: 4.3 G/DL (ref 3.5–5.2)
ALP SERPL-CCNC: 131 U/L (ref 35–104)
ALT SERPL W P-5'-P-CCNC: 38 U/L (ref 10–35)
ANION GAP SERPL CALCULATED.3IONS-SCNC: 13 MMOL/L (ref 7–15)
AST SERPL W P-5'-P-CCNC: 29 U/L (ref 10–35)
BILIRUB SERPL-MCNC: 0.3 MG/DL
BUN SERPL-MCNC: 17.1 MG/DL (ref 8–23)
CALCIUM SERPL-MCNC: 9.8 MG/DL (ref 8.8–10.2)
CHLORIDE SERPL-SCNC: 107 MMOL/L (ref 98–107)
CREAT SERPL-MCNC: 0.63 MG/DL (ref 0.51–0.95)
DEPRECATED HCO3 PLAS-SCNC: 20 MMOL/L (ref 22–29)
GFR SERPL CREATININE-BSD FRML MDRD: >90 ML/MIN/1.73M2
GLUCOSE SERPL-MCNC: 108 MG/DL (ref 70–99)
POTASSIUM SERPL-SCNC: 4.6 MMOL/L (ref 3.4–5.3)
PROT SERPL-MCNC: 6.8 G/DL (ref 6.4–8.3)
SODIUM SERPL-SCNC: 140 MMOL/L (ref 136–145)
T4 FREE SERPL-MCNC: 0.91 NG/DL (ref 0.9–1.7)
TSH SERPL DL<=0.005 MIU/L-ACNC: 8.11 UIU/ML (ref 0.3–4.2)

## 2023-01-30 PROCEDURE — 84439 ASSAY OF FREE THYROXINE: CPT | Performed by: INTERNAL MEDICINE

## 2023-01-30 PROCEDURE — 80053 COMPREHEN METABOLIC PANEL: CPT | Performed by: INTERNAL MEDICINE

## 2023-01-30 PROCEDURE — 84443 ASSAY THYROID STIM HORMONE: CPT | Performed by: INTERNAL MEDICINE

## 2023-01-30 PROCEDURE — 36415 COLL VENOUS BLD VENIPUNCTURE: CPT | Performed by: INTERNAL MEDICINE

## 2023-02-10 ENCOUNTER — NURSE TRIAGE (OUTPATIENT)
Dept: INTERNAL MEDICINE | Facility: CLINIC | Age: 73
End: 2023-02-10
Payer: COMMERCIAL

## 2023-02-10 NOTE — TELEPHONE ENCOUNTER
Nurse Triage SBAR    Is this a 2nd Level Triage? YES, LICENSED PRACTITIONER REVIEW IS REQUIRED    Situation: Alerted by  to call patient.    Background: Patient has been had fever for 5 days.    Assessment: Patient has had low grade fever since Monday 2/6/23. Occasional chills, had triple bypass in September. Has been taking Tylenol occasionally that help with fever, but then fever comes back. No pain in urination. No discoloring in urine.     Protocol Recommended Disposition:   No disposition on file.    Recommendation: Will route to provider.     Routed to provider    Does the patient meet one of the following criteria for ADS visit consideration? No    Additional Information    Negative: Difficult to awaken or acting confused (e.g., disoriented, slurred speech)    Negative: Pale cold skin and very weak (can't stand)    Negative: Difficulty breathing and bluish (or gray) lips or face    Negative: New-onset rash with purple (or blood-colored) spots or dots    Negative: Sounds like a life-threatening emergency to the triager    Negative: Fever onset within 24 hours of receiving vaccine    Negative: Fever within 14 days of COVID-19 Exposure    Negative: Pregnant    Negative: Postpartum (from 0 to 6 weeks after delivery)    Negative: Headache and stiff neck (can't touch chin to chest)    Negative: Difficulty breathing    Negative: IV Drug Use (IVDU)    Negative: Fever > 103 F (39.4 C)    Negative: Fever > 100.0 F (37.8 C) and indwelling urinary catheter (e.g., Gandhi, coude)    Negative: Fever > 100.4 F (38.0 C) and has port (portacath), central line, or PICC line    Negative: Drinking very little and has signs of dehydration (e.g., no urine > 12 hours, very dry mouth, very lightheaded)    Negative: Patient sounds very sick or weak to the triager    Negative: Fever > 101 F (38.3 C) and over 60 years of age    Negative: Fever > 100.0 F (37.8 C) and diabetes mellitus or a weak immune system (e.g., HIV  "positive, chemotherapy, splenectomy)    Negative: Fever > 100.0 F (37.8 C) and bedridden (e.g., nursing home patient, stroke, chronic illness, recovering from surgery)    Negative: Fever > 100.4 F (38.0 C) and surgery in the past month    Negative: Transplant patient (e.g., liver, heart, lung, kidney)    Negative: Widespread rash and cause unknown    Negative: Severe chills (i.e., feeling extremely cold WITH shaking chills)    Answer Assessment - Initial Assessment Questions  1. TEMPERATURE: \"What is the most recent temperature?\"  \"How was it measured?\"       100.8, ear thermometer  2. ONSET: \"When did the fever start?\"       2/6/23  3. CHILLS: \"Do you have chills?\" If yes: \"How bad are they?\"  (e.g., none, mild, moderate, severe)    - NONE: no chills    - MILD: feeling cold    - MODERATE: feeling very cold, some shivering (feels better under a thick blanket)    - SEVERE: feeling extremely cold with shaking chills (general body shaking, rigors; even under a thick blanket)       Yes, mild  4. OTHER SYMPTOMS: \"Do you have any other symptoms besides the fever?\"  (e.g., abdomen pain, cough, diarrhea, earache, headache, sore throat, urination pain)      Cough little bit, head ache, nose congestion,   5. CAUSE: If there are no symptoms, ask: \"What do you think is causing the fever?\"       No idea  6. CONTACTS: \"Does anyone else in the family have an infection?\"         7. TREATMENT: \"What have you done so far to treat this fever?\" (e.g., medications)      Tylenol 2 325 mg, sometimes 1000.  8. IMMUNOCOMPROMISE: \"Do you have of the following: diabetes, HIV positive, splenectomy, cancer chemotherapy, chronic steroid treatment, transplant patient, etc.\"      Open triple bypass in September 9. PREGNANCY: \"Is there any chance you are pregnant?\" \"When was your last menstrual period?\"      no  10. TRAVEL: \"Have you traveled out of the country in the last month?\" (e.g., travel history, exposures)        no    Protocols " used: FEVER-A-[place]

## 2023-02-14 NOTE — TELEPHONE ENCOUNTER
Called patient, she is feeling better, fever gone right now. Patient is upset that nobody called her back on Friday. Offered to have her speak to manger, but she declined.     Sadia Godinez RN  [facility]

## 2023-03-09 DIAGNOSIS — I25.728 ATHEROSCLEROSIS OF AUTOLOGOUS ARTERY CORONARY ARTERY BYPASS GRAFT(S) WITH OTHER FORMS OF ANGINA PECTORIS (H): ICD-10-CM

## 2023-03-09 DIAGNOSIS — E78.00 HYPERCHOLESTEREMIA: ICD-10-CM

## 2023-03-09 DIAGNOSIS — Z95.1 S/P CABG (CORONARY ARTERY BYPASS GRAFT): ICD-10-CM

## 2023-03-09 RX ORDER — CARVEDILOL 6.25 MG/1
6.25 TABLET ORAL 2 TIMES DAILY WITH MEALS
Qty: 180 TABLET | Refills: 0 | Status: SHIPPED | OUTPATIENT
Start: 2023-03-09 | End: 2023-05-31

## 2023-03-10 ENCOUNTER — NURSE TRIAGE (OUTPATIENT)
Dept: INTERNAL MEDICINE | Facility: CLINIC | Age: 73
End: 2023-03-10
Payer: COMMERCIAL

## 2023-03-10 ENCOUNTER — NURSE TRIAGE (OUTPATIENT)
Dept: CARDIOLOGY | Facility: CLINIC | Age: 73
End: 2023-03-10

## 2023-03-10 NOTE — TELEPHONE ENCOUNTER
"Received a call from the call center to discuss a reccuring fever, along with chest pain, coughing.  Spoke to Kalani who says she had a fever a few days ago, highest was 101. She did not have any other symptoms then. She says it went away, but has returned. Today temp was 99.1. She says she also has been having right sided chest pain that goes into her right arm. She says she has had left sided chest pain also, but not currently. She rates the pain she had earlier a #7-8 and describes it as sharp, and goes into her back. She took tylenol and pain is now #5. She mentions her oxygen level has been on the lower side 91-93%. She denies any dizziness, shortness of breath, nausea, sweating.   She wanted to know if Dr. Villalobos could see her today and get a CXR.  Advised it is recommended to go to ED for an evaluation. She declines going to the ED due to the long wait times, but she is willing to go to Urgent Care for an evaluation. Advised it is possible that Urgent Care may direct her to the ED if necessary. She verbalized agreement and understanding. She says she knows of an Urgent Care near her home.  Will send a message to team 4 for an update.  1. LOCATION: \"Where does it hurt?\" right side of chest, sometimes left  2. RADIATION: \"Does the pain go anywhere else?\" (e.g., into neck, jaw, arms, back) to right arm  3. ONSET: \"When did the chest pain begin?\" (Minutes, hours or days) today  4. PATTERN \"Does the pain come and go, or has it been constant since it started?\" \"Does it get worse with exertion?\" constant  5. DURATION: \"How long does it last\" (e.g., seconds, minutes, hours)  6. SEVERITY: \"How bad is the pain?\" (e.g., Scale 1-10; mild, moderate, or severe) worst 7-8 but currently #5. Moderate to severe  - MILD (1-3): doesn't interfere with normal activities   - MODERATE (4-7): interferes with normal activities or awakens from sleep  - SEVERE (8-10): excruciating pain, unable to do any normal activities   7. CARDIAC " "RISK FACTORS: \"Do you have any history of heart problems or risk factors for heart disease?\" (e.g., angina, prior heart attack; diabetes, high blood pressure, high cholesterol, smoker, or strong family history of heart disease) s/p CABG  8. PULMONARY RISK FACTORS: \"Do you have any history of lung disease?\" (e.g., blood clots in lung, asthma, emphysema, birth control pills)  9. CAUSE: \"What do you think is causing the chest pain?\"  10. OTHER SYMPTOMS: \"Do you have any other symptoms?\" (e.g., dizziness, nausea, vomiting, sweating, fever, difficulty breathing, cough) fever, cough, lower oxygen saturation levels      "

## 2023-03-10 NOTE — TELEPHONE ENCOUNTER
"S-(situation): Pt calling in regarding chest pain and fever.     B-(background): Pt has hx of open heart surgery x6 months ago, requesting in clinic appt.     A-(assessment): Pt states she is having right sided chest pain that radiates down right arm. Denies SOB or difficulty breathing, is talking in complete sentences without difficulty, rates pain 7/10. States that it is \"a very sharp pain.\"     R-(recommendations): Reviewed advice under care tab, due to hx and pain recommended ED and EMS transport, pt declines, states \"I will call my cardiologist first. I just want an xray.\" RN educated pt on reason for recommendation, pt continues to decline. No further questions.     Reason for Disposition    Chest pain lasting longer than 5 minutes and ANY of the following:* Over 44 years old* Over 30 years old and at least one cardiac risk factor (e.g., diabetes mellitus, high blood pressure, high cholesterol, smoker, or strong family history of heart disease)* History of heart disease (i.e., angina, heart attack, heart failure, bypass surgery, takes nitroglycerin)* Pain is crushing, pressure-like, or heavy    Additional Information    Negative: SEVERE difficulty breathing (e.g., struggling for each breath, speaks in single words)    Negative: Passed out (i.e., fainted, collapsed and was not responding)    Negative: Difficult to awaken or acting confused (e.g., disoriented, slurred speech)    Negative: Shock suspected (e.g., cold/pale/clammy skin, too weak to stand, low BP, rapid pulse)    Protocols used: CHEST PAIN-A-OH    Zuleyma MCCORD RN    "

## 2023-03-16 RX ORDER — METOPROLOL SUCCINATE 25 MG/1
TABLET, EXTENDED RELEASE ORAL
Qty: 30 TABLET | Refills: 2 | OUTPATIENT
Start: 2023-03-16

## 2023-03-22 ENCOUNTER — OFFICE VISIT (OUTPATIENT)
Dept: INTERNAL MEDICINE | Facility: CLINIC | Age: 73
End: 2023-03-22
Payer: COMMERCIAL

## 2023-03-22 VITALS
DIASTOLIC BLOOD PRESSURE: 80 MMHG | TEMPERATURE: 97.6 F | HEART RATE: 74 BPM | OXYGEN SATURATION: 98 % | SYSTOLIC BLOOD PRESSURE: 126 MMHG | HEIGHT: 59 IN | WEIGHT: 135 LBS | RESPIRATION RATE: 18 BRPM | BODY MASS INDEX: 27.21 KG/M2

## 2023-03-22 DIAGNOSIS — B99.9 FEVER DUE TO INFECTION: ICD-10-CM

## 2023-03-22 DIAGNOSIS — J98.8 RESPIRATORY INFECTION: Primary | ICD-10-CM

## 2023-03-22 DIAGNOSIS — R21 RASH: ICD-10-CM

## 2023-03-22 PROBLEM — K64.9 HEMORRHOIDS: Status: ACTIVE | Noted: 2023-03-22

## 2023-03-22 PROBLEM — L82.1 SEBORRHEIC KERATOSIS: Status: ACTIVE | Noted: 2023-03-22

## 2023-03-22 PROBLEM — M99.09 SEGMENTAL AND SOMATIC DYSFUNCTION: Status: ACTIVE | Noted: 2023-03-22

## 2023-03-22 PROBLEM — E55.9 VITAMIN D DEFICIENCY: Status: ACTIVE | Noted: 2023-03-22

## 2023-03-22 PROBLEM — M76.32 IT BAND SYNDROME, LEFT: Status: ACTIVE | Noted: 2020-10-07

## 2023-03-22 PROBLEM — M99.03 SOMATIC DYSFUNCTION OF LUMBAR REGION: Status: ACTIVE | Noted: 2023-03-22

## 2023-03-22 PROBLEM — H91.90 HEARING LOSS: Status: ACTIVE | Noted: 2023-03-22

## 2023-03-22 PROBLEM — S93.429A SPRAIN OF DELTOID LIGAMENT OF ANKLE: Status: ACTIVE | Noted: 2023-03-22

## 2023-03-22 PROBLEM — T78.2XXA ANAPHYLAXIS: Status: ACTIVE | Noted: 2023-03-22

## 2023-03-22 PROBLEM — R73.09 ABNORMAL GLUCOSE LEVEL: Status: ACTIVE | Noted: 2023-03-22

## 2023-03-22 PROBLEM — Z20.822 EXPOSURE TO SEVERE ACUTE RESPIRATORY SYNDROME CORONAVIRUS 2 (SARS-COV-2): Status: ACTIVE | Noted: 2023-03-22

## 2023-03-22 PROBLEM — L65.9 ALOPECIA: Status: ACTIVE | Noted: 2023-03-22

## 2023-03-22 PROBLEM — Z86.73 HISTORY OF STROKE WITHOUT RESIDUAL DEFICITS: Status: ACTIVE | Noted: 2023-03-22

## 2023-03-22 PROBLEM — J45.909 ASTHMA: Status: ACTIVE | Noted: 2023-03-22

## 2023-03-22 PROBLEM — R53.83 FATIGUE: Status: ACTIVE | Noted: 2023-03-22

## 2023-03-22 PROBLEM — L98.9 DISORDER OF SKIN OR SUBCUTANEOUS TISSUE: Status: ACTIVE | Noted: 2023-03-22

## 2023-03-22 PROBLEM — J30.1 ALLERGIC RHINITIS DUE TO POLLEN: Status: ACTIVE | Noted: 2023-03-22

## 2023-03-22 PROBLEM — I10 ESSENTIAL HYPERTENSION: Status: ACTIVE | Noted: 2023-03-22

## 2023-03-22 PROBLEM — M25.552 LEFT HIP PAIN: Status: ACTIVE | Noted: 2020-10-07

## 2023-03-22 PROBLEM — D25.9 UTERINE LEIOMYOMA: Status: ACTIVE | Noted: 2023-03-22

## 2023-03-22 PROBLEM — Z86.19 HISTORY OF INFECTIOUS DISEASE: Status: ACTIVE | Noted: 2023-03-22

## 2023-03-22 PROBLEM — M76.892 HIP ABDUCTOR TENDINITIS, LEFT: Status: ACTIVE | Noted: 2020-10-07

## 2023-03-22 PROCEDURE — 99214 OFFICE O/P EST MOD 30 MIN: CPT | Performed by: FAMILY MEDICINE

## 2023-03-22 RX ORDER — TRIAMCINOLONE ACETONIDE 55 UG/1
SPRAY, METERED NASAL
COMMUNITY
End: 2023-04-07

## 2023-03-22 RX ORDER — FLUTICASONE PROPIONATE 50 MCG
50 SPRAY, SUSPENSION (ML) NASAL PRN
COMMUNITY

## 2023-03-22 ASSESSMENT — ASTHMA QUESTIONNAIRES
QUESTION_3 LAST FOUR WEEKS HOW OFTEN DID YOUR ASTHMA SYMPTOMS (WHEEZING, COUGHING, SHORTNESS OF BREATH, CHEST TIGHTNESS OR PAIN) WAKE YOU UP AT NIGHT OR EARLIER THAN USUAL IN THE MORNING: NOT AT ALL
QUESTION_4 LAST FOUR WEEKS HOW OFTEN HAVE YOU USED YOUR RESCUE INHALER OR NEBULIZER MEDICATION (SUCH AS ALBUTEROL): ONE OR TWO TIMES PER DAY
QUESTION_5 LAST FOUR WEEKS HOW WOULD YOU RATE YOUR ASTHMA CONTROL: COMPLETELY CONTROLLED
QUESTION_1 LAST FOUR WEEKS HOW MUCH OF THE TIME DID YOUR ASTHMA KEEP YOU FROM GETTING AS MUCH DONE AT WORK, SCHOOL OR AT HOME: SOME OF THE TIME

## 2023-03-22 ASSESSMENT — PAIN SCALES - GENERAL: PAINLEVEL: NO PAIN (0)

## 2023-03-22 NOTE — PATIENT INSTRUCTIONS
Make appointment in 2 weeks. Need to repeat chest x ray.      Stop Lipitor for 5 days.  Then restart at 1/2 tablet (20 mg) daily.

## 2023-03-22 NOTE — PROGRESS NOTES
(J98.8) Respiratory infection  (primary encounter diagnosis)  Comment:   Recent respiratory infection with cough and fever.  Symptoms seem to have resolved after she took the Z-Shawn.  Patient has a concerned about x-ray finding of atelectasis and small pleural effusion.  Plan:   For now can be off antibiotic.  Allow couple more weeks for recovery.  I suggested a follow-up appointment in 2 weeks with repeat x-ray at that point.  Reevaluate.      (B99.9) Fever due to infection  Comment: Resolved.  Plan:       (R21) Rash  Comment:   Rash on upper trunk and to some extent arms.  Patient relates this to the increasing dose of Lipitor.  Looks a bit like dyshidrotic dermatitis also.  Plan:   In order to check on her therapy, I suggested holding Lipitor for 5 days and then restarting at a half dose which would be 20 mg.  Can see how she is doing in 2 weeks.          Shanae Uriarte is a 72 year old, presenting for the following health issues:  Patient is being seen for an urgent care follow up.  [facility]     ED/ Followup:    Facility:  [facility]    Date of visit: 03/11/2023  Reason for visit: Cough  Current Status: On antibiotic-was feeling better.      This 72-year-old woman had a fever about 2 weeks ago.  Then about on March 11, she had fever of 102 and a cough develop.  She was seen in urgent care.  She was on a Z-Shawn.  Has had no further fever and her cough has improved.    A chest x-ray taken at the time of her March 11 visit showed some atelectasis and slight pleural effusion on each side.  She is concerned about what this means.  It was seen in the context of her febrile illness listed above.  Right now she is feeling well.    No fever, no cough, no SOB.    She does have a history of coronary artery bypass in September 2022.  She also had COVID in July 2022.      Review of Systems     No fever.  No congestion, cough, SOB.  No chest pain or palpitations.  No nausea or abdominal  "pain.  No focal weakness.        Objective    /80 (BP Location: Right arm, Patient Position: Sitting, Cuff Size: Adult Large)   Pulse 74   Temp 97.6  F (36.4  C) (Oral)   Resp 18   Ht 1.499 m (4' 11\")   Wt 61.2 kg (135 lb)   LMP  (LMP Unknown)   SpO2 98%   Breastfeeding No   BMI 27.27 kg/m    There is no height or weight on file to calculate BMI.  Physical Exam     Patient appears well in general.  HEENT unremarkable.  Neck no thyromegaly.  Lungs are clear  Cardiac RSR, normal rate, no murmur.  Extremities without edema.  Motor and gait appear normal.              "

## 2023-04-07 ENCOUNTER — OFFICE VISIT (OUTPATIENT)
Dept: INTERNAL MEDICINE | Facility: CLINIC | Age: 73
End: 2023-04-07
Payer: COMMERCIAL

## 2023-04-07 ENCOUNTER — ANCILLARY PROCEDURE (OUTPATIENT)
Dept: GENERAL RADIOLOGY | Facility: CLINIC | Age: 73
End: 2023-04-07
Attending: INTERNAL MEDICINE
Payer: COMMERCIAL

## 2023-04-07 VITALS
DIASTOLIC BLOOD PRESSURE: 74 MMHG | TEMPERATURE: 97.8 F | HEART RATE: 76 BPM | HEIGHT: 59 IN | BODY MASS INDEX: 27.76 KG/M2 | SYSTOLIC BLOOD PRESSURE: 118 MMHG | RESPIRATION RATE: 14 BRPM | WEIGHT: 137.7 LBS | OXYGEN SATURATION: 96 %

## 2023-04-07 DIAGNOSIS — I10 ESSENTIAL HYPERTENSION: ICD-10-CM

## 2023-04-07 DIAGNOSIS — E55.9 VITAMIN D DEFICIENCY: ICD-10-CM

## 2023-04-07 DIAGNOSIS — R91.8 LUNG INFILTRATE: ICD-10-CM

## 2023-04-07 DIAGNOSIS — I25.728 ATHEROSCLEROSIS OF AUTOLOGOUS ARTERY CORONARY ARTERY BYPASS GRAFT(S) WITH OTHER FORMS OF ANGINA PECTORIS (H): ICD-10-CM

## 2023-04-07 DIAGNOSIS — R91.8 LUNG INFILTRATE: Primary | ICD-10-CM

## 2023-04-07 PROCEDURE — 99214 OFFICE O/P EST MOD 30 MIN: CPT | Performed by: INTERNAL MEDICINE

## 2023-04-07 PROCEDURE — 71046 X-RAY EXAM CHEST 2 VIEWS: CPT | Mod: TC | Performed by: RADIOLOGY

## 2023-04-07 RX ORDER — VITAMIN B COMPLEX
100 TABLET ORAL DAILY
COMMUNITY

## 2023-04-07 ASSESSMENT — PAIN SCALES - GENERAL: PAINLEVEL: NO PAIN (0)

## 2023-04-07 NOTE — PROGRESS NOTES
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    Weight as of this encounter: 62.5 kg (137 lb 11.2 oz).       See Patient Instructions    Danilo Salas MD  [facility] 72 [facility], presenting for the following health issues:  RECHECK (Pt needs follow up xray for pneumonia; feeling better, no fever)        4/7/2023     1:18 PM   Additional Questions   Roomed by Harper   Accompanied by n/a     HPI     Chief Complaint   Patient presents with     RECHECK     Pt needs follow up xray for pneumonia; feeling better, no fever       Patient is seen for a follow up visit.  Patient had pneumonia, treated with antibiotic. Symptoms have improved.   No cough, fever, chest pain.   Has h/o CAD, post CABG. Post surgery has had pleural effusions, resolved.   In march presented with cough , fever, CXR- LLL infiltrate.   Has h/o HTN. on medical treatment. BP has been controlled. No side effects from medications. No CP, HA, dizziness. good compliance with medications and low salt diet.        Review of Systems   Constitutional, HEENT, cardiovascular, pulmonary, gi and gu systems are negative, except as otherwise noted.      Objective    /74 (BP Location: Left arm, Cuff Size: Adult Regular)   Pulse 76   Temp 97.8  F (36.6  C) (Tympanic)   Resp 14   Ht 1.499 m (4' 11\")   Wt 62.5 kg (137 lb 11.2 oz)   LMP  (LMP Unknown)   SpO2 96%   BMI 27.81 kg/m    Body mass index is 27.81 kg/m .  Physical Exam   GENERAL: healthy, alert and no distress  NECK: no adenopathy, no asymmetry, " masses, or scars and thyroid normal to palpation  RESP: lungs clear to auscultation - no rales, rhonchi or wheezes  CV: regular rate and rhythm, normal S1 S2, no S3 or S4, no murmur, click or rub, no peripheral edema and peripheral pulses strong  ABDOMEN: soft, nontender, no hepatosplenomegaly, no masses and bowel sounds normal  MS: no gross musculoskeletal defects noted, no edema    Lab on 01/30/2023   Component Date Value Ref Range Status     TSH 01/30/2023 8.11 (H)  0.30 - 4.20 uIU/mL Final     Sodium 01/30/2023 140  136 - 145 mmol/L Final     Potassium 01/30/2023 4.6  3.4 - 5.3 mmol/L Final     Chloride 01/30/2023 107  98 - 107 mmol/L Final     Carbon Dioxide (CO2) 01/30/2023 20 (L)  22 - 29 mmol/L Final     Anion Gap 01/30/2023 13  7 - 15 mmol/L Final     Urea Nitrogen 01/30/2023 17.1  8.0 - 23.0 mg/dL Final     Creatinine 01/30/2023 0.63  0.51 - 0.95 mg/dL Final     Calcium 01/30/2023 9.8  8.8 - 10.2 mg/dL Final     Glucose 01/30/2023 108 (H)  70 - 99 mg/dL Final     Alkaline Phosphatase 01/30/2023 131 (H)  35 - 104 U/L Final     AST 01/30/2023 29  10 - 35 U/L Final     ALT 01/30/2023 38 (H)  10 - 35 U/L Final     Protein Total 01/30/2023 6.8  6.4 - 8.3 g/dL Final     Albumin 01/30/2023 4.3  3.5 - 5.2 g/dL Final     Bilirubin Total 01/30/2023 0.3  <=1.2 mg/dL Final     GFR Estimate 01/30/2023 >90  >60 mL/min/1.73m2 Final    eGFR calculated using 2021 CKD-EPI equation.     Free T4 01/30/2023 0.91  0.90 - 1.70 ng/dL Final

## 2023-04-07 NOTE — LETTER
April 10, 2023      Kalani Anum  [address] [place] [address]        Dear ,    We are writing to inform you of your test results.    The xray showed mild scarring in the left lung base. No evidence of infection.     Resulted Orders   XR Chest 2 Views    Narrative    CHEST TWO VIEWS  4/7/2023 2:17 PM     HISTORY: 72-year-old woman with history of lung infiltrate.       Impression    IMPRESSION: Since September 22, 2020, heart size is normal. There may  be minimal linear opacity in the left lung on lateral radiograph,  likely atelectasis or scar formation. No pleural effusion,  pneumothorax, or abnormal area of consolidation. Median sternotomy  wires and surgical clips unchanged.    FEDERICO IVEY MD         SYSTEM ID:  [id number]       If you have any questions or concerns, please call the clinic at the number listed above.       Sincerely,      Danilo Salas MD

## 2023-04-07 NOTE — LETTER
April 7, 2023      Kalani Rowan  [address] [place] [address]        To Whom It May Concern,     Mrs Rowan is a patient, seen in our clinic.   She has history of heart surgery.   The hardware from surgery may trigger security sensors for metal detection.           Sincerely,        Danilo Salas MD

## 2023-05-31 ENCOUNTER — OFFICE VISIT (OUTPATIENT)
Dept: CARDIOLOGY | Facility: CLINIC | Age: 73
End: 2023-05-31
Attending: INTERNAL MEDICINE
Payer: COMMERCIAL

## 2023-05-31 ENCOUNTER — LAB (OUTPATIENT)
Dept: LAB | Facility: CLINIC | Age: 73
End: 2023-05-31
Payer: COMMERCIAL

## 2023-05-31 VITALS — HEIGHT: 59 IN | BODY MASS INDEX: 27.62 KG/M2 | WEIGHT: 137 LBS

## 2023-05-31 DIAGNOSIS — Z95.1 S/P CABG (CORONARY ARTERY BYPASS GRAFT): ICD-10-CM

## 2023-05-31 DIAGNOSIS — E55.9 VITAMIN D DEFICIENCY, UNSPECIFIED: ICD-10-CM

## 2023-05-31 DIAGNOSIS — I25.728 ATHEROSCLEROSIS OF AUTOLOGOUS ARTERY CORONARY ARTERY BYPASS GRAFT(S) WITH OTHER FORMS OF ANGINA PECTORIS (H): ICD-10-CM

## 2023-05-31 DIAGNOSIS — R22.42 LOCALIZED SWELLING, MASS AND LUMP, LEFT LOWER LIMB: ICD-10-CM

## 2023-05-31 DIAGNOSIS — E78.00 HYPERCHOLESTEREMIA: ICD-10-CM

## 2023-05-31 DIAGNOSIS — I10 ESSENTIAL (PRIMARY) HYPERTENSION: ICD-10-CM

## 2023-05-31 PROCEDURE — 99215 OFFICE O/P EST HI 40 MIN: CPT | Performed by: INTERNAL MEDICINE

## 2023-05-31 PROCEDURE — 85025 COMPLETE CBC W/AUTO DIFF WBC: CPT | Performed by: INTERNAL MEDICINE

## 2023-05-31 PROCEDURE — 82306 VITAMIN D 25 HYDROXY: CPT | Performed by: INTERNAL MEDICINE

## 2023-05-31 PROCEDURE — 36415 COLL VENOUS BLD VENIPUNCTURE: CPT | Performed by: INTERNAL MEDICINE

## 2023-05-31 RX ORDER — LOSARTAN POTASSIUM 25 MG/1
25 TABLET ORAL DAILY
Qty: 90 TABLET | Refills: 3 | Status: SHIPPED | OUTPATIENT
Start: 2023-05-31 | End: 2023-06-14

## 2023-05-31 RX ORDER — ATORVASTATIN CALCIUM 20 MG/1
40 TABLET, FILM COATED ORAL DAILY
Qty: 90 TABLET | Refills: 3 | Status: SHIPPED | OUTPATIENT
Start: 2023-05-31 | End: 2023-07-18

## 2023-05-31 NOTE — PATIENT INSTRUCTIONS
Try stopping the ATORVASTATIN for one month and see if the rash goes away.    If it goes away, you might try restarting it for a week and see if the rash comes back.    If it comes back, then we will stop it completely and use something else.      You may also try stopping the CARVEDILOL to see if you feel more energy and less tired off the medication.    For blood pressure, we will add LOSARTAN at a small dose (25mg) and see if that helps blood pressure without making you feel tired.    Can you call or message me in a few weeks and let me know what your blood pressures are running with the LOSARTAN instead of the CARVEDILOL and if your rash is getting better.    Can check the blood pressure once a day or a couple times per week.

## 2023-05-31 NOTE — LETTER
"5/31/2023    Lucia Lind MD  [address]  [facility] [address]    RE: Kalani Anum       Dear Colleague,     I had the pleasure of seeing Kalani Anum in the [facility].  Cardiology Progress Note          Assessment and Plan:       Fatigue  Recheck TSH, vitamin D, CBC  Trial of being off atorvastatin and carvedilol      Rash, possibly to atorvastatin  Discontinue atorvastatin for 1 month and then restart to see if rash recurs  May consider rosuvastatin if the rash recurs versus PCSK9 inhibition.      Hypertension, slight suboptimal control  Trial of losartan 25 mg daily.    Follow-up in 6 months per patient request    40 minutes was spent with the patient, precharting and reviewing tests as well as post visit charting all done today..    This note was transcribed using electronic voice recognition software and there may be typographical errors present.                    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  She still has fatigue.  She is currently on a small dose of carvedilol.  She thinks the atorvastatin is giving her hives at the 40 mg dose.    Blood pressure is slightly suboptimally controlled.    Her TSH was elevated but free T4 was within normal limits in January.                     Review of Systems:     Review of Systems:  Skin:        Eyes:       ENT:       Respiratory:  Negative    Cardiovascular:    Positive for;fatigue  Gastroenterology:      Genitourinary:       Musculoskeletal:       Neurologic:       Psychiatric:       Heme/Lymph/Imm:       Endocrine:                   Physical Exam:     Vitals: BP (!) (P) 144/78 (BP Location: Right arm, Patient Position: Sitting, Cuff Size: Adult Regular)   Pulse (P) 62   Ht 1.499 m (4' 11\")   Wt 62.1 kg (137 lb)   LMP  (LMP Unknown)   SpO2 (P) 96%   BMI 27.67 kg/m    Constitutional:  " cooperative, alert and oriented, well developed, well nourished, in no acute distress        Skin:  warm and dry to the touch, no apparent skin lesions or masses noted incision healing well      Head:  normocephalic, no masses or lesions        Eyes:  pupils equal and round, conjunctivae and lids unremarkable, sclera white, no xanthalasma, EOMS intact, no nystagmus        Chest:  normal symmetry        Cardiac: regular rhythm;normal S1 and S2     no presence of murmur            Extremities and Back:  no deformities, clubbing, cyanosis, erythema observed        Neurological:  no gross motor deficits;affect appropriate                 Medications:     Current Outpatient Medications   Medication Sig Dispense Refill    acetaminophen (TYLENOL) 325 MG tablet Take 2 tablets (650 mg) by mouth every 6 hours as needed for other (For optimal non-opioid multimodal pain management to improve pain control.) 100 tablet 0    albuterol (PROAIR HFA) 108 (90 Base) MCG/ACT inhaler Inhale 1-2 puffs into the lungs every 6 hours as needed for shortness of breath / dyspnea 18 g 4    ALBUTEROL SULFATE IN 90 mcg      aspirin (ASA) 81 MG chewable tablet Take 2 tablets (162 mg) by mouth daily 180 tablet 3    atorvastatin (LIPITOR) 20 MG tablet Take 2 tablets (40 mg) by mouth daily 90 tablet 3    Cholecalciferol (VITAMIN D3) 1.25 MG (63124 UT) TABS 5,000 mcg      Coenzyme Q10 (COQ10) 100 MG CAPS       fluticasone (FLONASE) 50 MCG/ACT nasal spray 50 mcg      losartan (COZAAR) 25 MG tablet Take 1 tablet (25 mg) by mouth daily 90 tablet 3    Multiple Vitamin (MULTIVITAMIN ADULT PO)       Multiple Vitamins-Minerals (PRESERVISION AREDS) CAPS       multivitamin w/minerals (THERA-VIT-M) tablet Take 1 tablet by mouth daily      nitroGLYcerin (NITROSTAT) 0.4 MG sublingual tablet For chest pain place 1 tablet under the tongue every 5 minutes for 3 doses. If symptoms persist 5 minutes after 1st dose call 911. 20 tablet 3    polyethylene glycol (MIRALAX)  17 GM/Dose powder Take 17 g by mouth daily as needed for constipation 510 g 0    senna-docusate (SENOKOT-S/PERICOLACE) 8.6-50 MG tablet Take 1 tablet by mouth 2 times daily 30 tablet 0    atorvastatin (LIPITOR) 40 MG tablet Take 1 tablet (40 mg) by mouth every evening (Patient not taking: Reported on 5/31/2023) 90 tablet 3                Data:   All laboratory data reviewed  No results found for this or any previous visit (from the past 24 hour(s)).    All laboratory data reviewed  Lab Results   Component Value Date    CHOL 188 12/13/2022    CHOL 377 11/02/2019     Lab Results   Component Value Date    HDL 42 12/13/2022    HDL 54 11/02/2019     Lab Results   Component Value Date    LDL 95 12/13/2022    LDL  11/02/2019     Cannot estimate LDL when triglyceride exceeds 400 mg/dL     11/02/2019     Lab Results   Component Value Date    TRIG 253 12/13/2022    TRIG 547 11/02/2019     Lab Results   Component Value Date    CHOLHDLRATIO 7.4 11/18/2013     TSH   Date Value Ref Range Status   01/30/2023 8.11 (H) 0.30 - 4.20 uIU/mL Final   12/13/2022 7.62 (H) 0.40 - 4.00 mU/L Final   11/02/2019 3.94 0.40 - 4.00 mU/L Final     Last Basic Metabolic Panel:  Lab Results   Component Value Date     01/30/2023     11/02/2019      Lab Results   Component Value Date    POTASSIUM 4.6 01/30/2023    POTASSIUM 4.2 12/13/2022    POTASSIUM 3.6 11/02/2019     Lab Results   Component Value Date    CHLORIDE 107 01/30/2023    CHLORIDE 108 12/13/2022    CHLORIDE 106 11/02/2019     Lab Results   Component Value Date    APRIL 9.8 01/30/2023    APRIL 9.6 11/02/2019     Lab Results   Component Value Date    CO2 20 01/30/2023    CO2 26 12/13/2022    CO2 23 11/02/2019     Lab Results   Component Value Date    BUN 17.1 01/30/2023    BUN 17 12/13/2022    BUN 19 11/02/2019     Lab Results   Component Value Date    CR 0.63 01/30/2023    CR 0.65 11/02/2019     Lab Results   Component Value Date     01/30/2023     12/13/2022      11/02/2019     Lab Results   Component Value Date    WBC 6.0 12/13/2022    WBC 6.9 11/02/2019     Lab Results   Component Value Date    RBC 5.25 12/13/2022    RBC 5.41 11/02/2019     Lab Results   Component Value Date    HGB 14.5 12/13/2022    HGB 16.6 11/02/2019     Lab Results   Component Value Date    HCT 45.4 12/13/2022    HCT 47.2 11/02/2019     Lab Results   Component Value Date    MCV 87 12/13/2022    MCV 87 11/02/2019     Lab Results   Component Value Date    MCH 27.6 12/13/2022    MCH 30.7 11/02/2019     Lab Results   Component Value Date    MCHC 31.9 12/13/2022    MCHC 35.2 11/02/2019     Lab Results   Component Value Date    RDW 13.4 12/13/2022    RDW 13.3 11/02/2019     Lab Results   Component Value Date     12/13/2022     11/02/2019         Thank you for allowing me to participate in the care of your patient.      Sincerely,     Ever Villalobos MD     [facility]  cc:   Ever Villalobos MD  [address]  [place] [address]

## 2023-05-31 NOTE — PROGRESS NOTES
"Cardiology Progress Note          Assessment and Plan:       1. Fatigue    Recheck TSH, vitamin D, CBC    Trial of being off atorvastatin and carvedilol      2. Rash, possibly to atorvastatin    Discontinue atorvastatin for 1 month and then restart to see if rash recurs    May consider rosuvastatin if the rash recurs versus PCSK9 inhibition.      3. Hypertension, slight suboptimal control    Trial of losartan 25 mg daily.    Follow-up in 6 months per patient request    40 minutes was spent with the patient, precharting and reviewing tests as well as post visit charting all done today..    This note was transcribed using electronic voice recognition software and there may be typographical errors present.                    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  She still has fatigue.  She is currently on a small dose of carvedilol.  She thinks the atorvastatin is giving her hives at the 40 mg dose.    Blood pressure is slightly suboptimally controlled.    Her TSH was elevated but free T4 was within normal limits in January.                     Review of Systems:     Review of Systems:  Skin:        Eyes:       ENT:       Respiratory:  Negative    Cardiovascular:    Positive for;fatigue  Gastroenterology:      Genitourinary:       Musculoskeletal:       Neurologic:       Psychiatric:       Heme/Lymph/Imm:       Endocrine:                   Physical Exam:     Vitals: BP (!) (P) 144/78 (BP Location: Right arm, Patient Position: Sitting, Cuff Size: Adult Regular)   Pulse (P) 62   Ht 1.499 m (4' 11\")   Wt 62.1 kg (137 lb)   LMP  (LMP Unknown)   SpO2 (P) 96%   BMI 27.67 kg/m    Constitutional:  cooperative, alert and oriented, well developed, well nourished, in no acute distress        Skin:  warm and dry to the touch, no apparent skin lesions or masses noted incision healing well      Head:  " normocephalic, no masses or lesions        Eyes:  pupils equal and round, conjunctivae and lids unremarkable, sclera white, no xanthalasma, EOMS intact, no nystagmus        Chest:  normal symmetry        Cardiac: regular rhythm;normal S1 and S2     no presence of murmur            Extremities and Back:  no deformities, clubbing, cyanosis, erythema observed        Neurological:  no gross motor deficits;affect appropriate                 Medications:     Current Outpatient Medications   Medication Sig Dispense Refill     acetaminophen (TYLENOL) 325 MG tablet Take 2 tablets (650 mg) by mouth every 6 hours as needed for other (For optimal non-opioid multimodal pain management to improve pain control.) 100 tablet 0     albuterol (PROAIR HFA) 108 (90 Base) MCG/ACT inhaler Inhale 1-2 puffs into the lungs every 6 hours as needed for shortness of breath / dyspnea 18 g 4     ALBUTEROL SULFATE IN 90 mcg       aspirin (ASA) 81 MG chewable tablet Take 2 tablets (162 mg) by mouth daily 180 tablet 3     atorvastatin (LIPITOR) 20 MG tablet Take 2 tablets (40 mg) by mouth daily 90 tablet 3     Cholecalciferol (VITAMIN D3) 1.25 MG (51951 UT) TABS 5,000 mcg       Coenzyme Q10 (COQ10) 100 MG CAPS        fluticasone (FLONASE) 50 MCG/ACT nasal spray 50 mcg       losartan (COZAAR) 25 MG tablet Take 1 tablet (25 mg) by mouth daily 90 tablet 3     Multiple Vitamin (MULTIVITAMIN ADULT PO)        Multiple Vitamins-Minerals (PRESERVISION AREDS) CAPS        multivitamin w/minerals (THERA-VIT-M) tablet Take 1 tablet by mouth daily       nitroGLYcerin (NITROSTAT) 0.4 MG sublingual tablet For chest pain place 1 tablet under the tongue every 5 minutes for 3 doses. If symptoms persist 5 minutes after 1st dose call 911. 20 tablet 3     polyethylene glycol (MIRALAX) 17 GM/Dose powder Take 17 g by mouth daily as needed for constipation 510 g 0     senna-docusate (SENOKOT-S/PERICOLACE) 8.6-50 MG tablet Take 1 tablet by mouth 2 times daily 30 tablet 0      atorvastatin (LIPITOR) 40 MG tablet Take 1 tablet (40 mg) by mouth every evening (Patient not taking: Reported on 5/31/2023) 90 tablet 3                Data:   All laboratory data reviewed  No results found for this or any previous visit (from the past 24 hour(s)).    All laboratory data reviewed  Lab Results   Component Value Date    CHOL 188 12/13/2022    CHOL 377 11/02/2019     Lab Results   Component Value Date    HDL 42 12/13/2022    HDL 54 11/02/2019     Lab Results   Component Value Date    LDL 95 12/13/2022    LDL  11/02/2019     Cannot estimate LDL when triglyceride exceeds 400 mg/dL     11/02/2019     Lab Results   Component Value Date    TRIG 253 12/13/2022    TRIG 547 11/02/2019     Lab Results   Component Value Date    CHOLHDLRATIO 7.4 11/18/2013     TSH   Date Value Ref Range Status   01/30/2023 8.11 (H) 0.30 - 4.20 uIU/mL Final   12/13/2022 7.62 (H) 0.40 - 4.00 mU/L Final   11/02/2019 3.94 0.40 - 4.00 mU/L Final     Last Basic Metabolic Panel:  Lab Results   Component Value Date     01/30/2023     11/02/2019      Lab Results   Component Value Date    POTASSIUM 4.6 01/30/2023    POTASSIUM 4.2 12/13/2022    POTASSIUM 3.6 11/02/2019     Lab Results   Component Value Date    CHLORIDE 107 01/30/2023    CHLORIDE 108 12/13/2022    CHLORIDE 106 11/02/2019     Lab Results   Component Value Date    APRIL 9.8 01/30/2023    APRIL 9.6 11/02/2019     Lab Results   Component Value Date    CO2 20 01/30/2023    CO2 26 12/13/2022    CO2 23 11/02/2019     Lab Results   Component Value Date    BUN 17.1 01/30/2023    BUN 17 12/13/2022    BUN 19 11/02/2019     Lab Results   Component Value Date    CR 0.63 01/30/2023    CR 0.65 11/02/2019     Lab Results   Component Value Date     01/30/2023     12/13/2022     11/02/2019     Lab Results   Component Value Date    WBC 6.0 12/13/2022    WBC 6.9 11/02/2019     Lab Results   Component Value Date    RBC 5.25 12/13/2022    RBC 5.41  11/02/2019     Lab Results   Component Value Date    HGB 14.5 12/13/2022    HGB 16.6 11/02/2019     Lab Results   Component Value Date    HCT 45.4 12/13/2022    HCT 47.2 11/02/2019     Lab Results   Component Value Date    MCV 87 12/13/2022    MCV 87 11/02/2019     Lab Results   Component Value Date    MCH 27.6 12/13/2022    MCH 30.7 11/02/2019     Lab Results   Component Value Date    MCHC 31.9 12/13/2022    MCHC 35.2 11/02/2019     Lab Results   Component Value Date    RDW 13.4 12/13/2022    RDW 13.3 11/02/2019     Lab Results   Component Value Date     12/13/2022     11/02/2019

## 2023-06-01 LAB — DEPRECATED CALCIDIOL+CALCIFEROL SERPL-MC: 69 UG/L (ref 20–75)

## 2023-06-02 LAB
BASOPHILS # BLD AUTO: 0 10E3/UL (ref 0–0.2)
BASOPHILS NFR BLD AUTO: 1 %
EOSINOPHIL # BLD AUTO: 0.1 10E3/UL (ref 0–0.7)
EOSINOPHIL NFR BLD AUTO: 2 %
ERYTHROCYTE [DISTWIDTH] IN BLOOD BY AUTOMATED COUNT: 13.3 % (ref 10–15)
HCT VFR BLD AUTO: 45.7 % (ref 35–47)
HGB BLD-MCNC: 15.4 G/DL (ref 11.7–15.7)
IMM GRANULOCYTES # BLD: 0 10E3/UL
IMM GRANULOCYTES NFR BLD: 0 %
LYMPHOCYTES # BLD AUTO: 2.1 10E3/UL (ref 0.8–5.3)
LYMPHOCYTES NFR BLD AUTO: 34 %
MCH RBC QN AUTO: 30.4 PG (ref 26.5–33)
MCHC RBC AUTO-ENTMCNC: 33.7 G/DL (ref 31.5–36.5)
MCV RBC AUTO: 90 FL (ref 78–100)
MONOCYTES # BLD AUTO: 0.4 10E3/UL (ref 0–1.3)
MONOCYTES NFR BLD AUTO: 7 %
NEUTROPHILS # BLD AUTO: 3.5 10E3/UL (ref 1.6–8.3)
NEUTROPHILS NFR BLD AUTO: 56 %
NRBC # BLD AUTO: 0 10E3/UL
NRBC BLD AUTO-RTO: 0 /100
PLATELET # BLD AUTO: 293 10E3/UL (ref 150–450)
RBC # BLD AUTO: 5.07 10E6/UL (ref 3.8–5.2)
WBC # BLD AUTO: 6.2 10E3/UL (ref 4–11)

## 2023-06-12 ENCOUNTER — TELEPHONE (OUTPATIENT)
Dept: CARDIOLOGY | Facility: CLINIC | Age: 73
End: 2023-06-12
Payer: COMMERCIAL

## 2023-06-12 DIAGNOSIS — I10 ESSENTIAL (PRIMARY) HYPERTENSION: Primary | ICD-10-CM

## 2023-06-12 NOTE — TELEPHONE ENCOUNTER
Health Call Center    Phone Message    May a detailed message be left on voicemail: yes     Reason for Call: Medication Question or concern regarding medication   Prescription Clarification  Name of Medication: losartan (COZAAR) 25 MG tablet     What on the order needs clarification? Patient called wanting to speak with someone on the care team. Patient stated that taking losartan (COZAAR) 25 MG tablet does not help with blood pressure, and they don't like the side effects of it. Wanting to see if there is any alternatives for this. Please call patient back to further discuss.       Action Taken: Other: Cardiology    Travel Screening: Not Applicable     Thank you!  [facility]

## 2023-06-13 NOTE — TELEPHONE ENCOUNTER
[facility] Call Center    Phone Message    May a detailed message be left on voicemail: no     Reason for Call: Medication Question or concern regarding medication   Prescription Clarification  Name of Medication: Carvedilol   Prescribing Provider: Ever Villalobos MD    Pharmacy: [facility] [id number] [phone] [facility] [address]   What on the order needs clarification?   Kalani called back stating she would like to continue taking Rx Carvedilol, please send order to pharmacy listed above and reach out to her when complete. Kalani is completely out of this medication, high priority.           Action Taken: Other: [facility] Cardiology    Travel Screening: Not Applicable

## 2023-06-13 NOTE — TELEPHONE ENCOUNTER
Spoke with patient and she said she was finishing up her Carvedilol before switching to Losartan and her BP has been ranging 112-117 systolic with HR in the 70s. Pt is wondering if she could  stay on the Carvedilol 6.25 mg BID     Pt has been holding the Atorvastatin since 5/31/23 and her fash is improving slowly. Still there. But has noticed that she has more energy and feels overall better. Pt will continue to hold for the full month and see how she continues to feel.     Will route to Dr. Villalobos to review.

## 2023-06-14 RX ORDER — CARVEDILOL 6.25 MG/1
6.25 TABLET ORAL 2 TIMES DAILY WITH MEALS
Qty: 180 TABLET | Refills: 3 | Status: SHIPPED | OUTPATIENT
Start: 2023-06-14 | End: 2024-04-22

## 2023-06-14 NOTE — TELEPHONE ENCOUNTER
RN called patient and updated her with Dr. Villalobos's recommendations below. Patient verbalized understanding and is in agreement with plan. RN updated patient's med list to reflect this change and sent in new rx for her for carvedilol 6.25mg BID.       Sure, no problem   Dr. Villalobos

## 2023-06-27 ENCOUNTER — OFFICE VISIT (OUTPATIENT)
Dept: INTERNAL MEDICINE | Facility: CLINIC | Age: 73
End: 2023-06-27
Payer: COMMERCIAL

## 2023-06-27 VITALS
BODY MASS INDEX: 27.62 KG/M2 | OXYGEN SATURATION: 100 % | DIASTOLIC BLOOD PRESSURE: 73 MMHG | HEIGHT: 59 IN | TEMPERATURE: 97.5 F | WEIGHT: 137 LBS | HEART RATE: 98 BPM | SYSTOLIC BLOOD PRESSURE: 129 MMHG

## 2023-06-27 DIAGNOSIS — E78.00 HYPERCHOLESTEREMIA: ICD-10-CM

## 2023-06-27 DIAGNOSIS — I10 ESSENTIAL HYPERTENSION: ICD-10-CM

## 2023-06-27 DIAGNOSIS — E55.9 VITAMIN D DEFICIENCY: ICD-10-CM

## 2023-06-27 DIAGNOSIS — J45.20 MILD INTERMITTENT ASTHMA, UNSPECIFIED WHETHER COMPLICATED: ICD-10-CM

## 2023-06-27 DIAGNOSIS — E78.00 PURE HYPERCHOLESTEROLEMIA: ICD-10-CM

## 2023-06-27 DIAGNOSIS — Z78.0 ASYMPTOMATIC MENOPAUSAL STATE: ICD-10-CM

## 2023-06-27 DIAGNOSIS — J98.11 ATELECTASIS: Primary | ICD-10-CM

## 2023-06-27 PROCEDURE — 99214 OFFICE O/P EST MOD 30 MIN: CPT | Performed by: INTERNAL MEDICINE

## 2023-06-27 PROCEDURE — 36415 COLL VENOUS BLD VENIPUNCTURE: CPT | Performed by: INTERNAL MEDICINE

## 2023-06-27 PROCEDURE — 80061 LIPID PANEL: CPT | Performed by: INTERNAL MEDICINE

## 2023-06-27 PROCEDURE — 80053 COMPREHEN METABOLIC PANEL: CPT | Performed by: INTERNAL MEDICINE

## 2023-06-27 PROCEDURE — 84443 ASSAY THYROID STIM HORMONE: CPT | Performed by: INTERNAL MEDICINE

## 2023-06-27 RX ORDER — ALBUTEROL SULFATE 90 UG/1
1-2 AEROSOL, METERED RESPIRATORY (INHALATION) EVERY 6 HOURS PRN
Qty: 18 G | Refills: 4 | Status: SHIPPED | OUTPATIENT
Start: 2023-06-27

## 2023-06-27 ASSESSMENT — ASTHMA QUESTIONNAIRES
QUESTION_5 LAST FOUR WEEKS HOW WOULD YOU RATE YOUR ASTHMA CONTROL: WELL CONTROLLED
EMERGENCY_ROOM_LAST_YEAR_TOTAL: ONE
QUESTION_2 LAST FOUR WEEKS HOW OFTEN HAVE YOU HAD SHORTNESS OF BREATH: NOT AT ALL
QUESTION_1 LAST FOUR WEEKS HOW MUCH OF THE TIME DID YOUR ASTHMA KEEP YOU FROM GETTING AS MUCH DONE AT WORK, SCHOOL OR AT HOME: NONE OF THE TIME
ACT_TOTALSCORE: 23
QUESTION_3 LAST FOUR WEEKS HOW OFTEN DID YOUR ASTHMA SYMPTOMS (WHEEZING, COUGHING, SHORTNESS OF BREATH, CHEST TIGHTNESS OR PAIN) WAKE YOU UP AT NIGHT OR EARLIER THAN USUAL IN THE MORNING: NOT AT ALL
QUESTION_4 LAST FOUR WEEKS HOW OFTEN HAVE YOU USED YOUR RESCUE INHALER OR NEBULIZER MEDICATION (SUCH AS ALBUTEROL): ONCE A WEEK OR LESS
ACT_TOTALSCORE: 23

## 2023-06-27 NOTE — PROGRESS NOTES
"  Assessment & Plan     Atelectasis  Reviewed old xrays. offeared reassurance nothing looks like ongoing infection or cancer at this point.     Essential hypertension  Due for labs. under good control   - TSH with free T4 reflex; Future  - Comprehensive metabolic panel (BMP + Alb, Alk Phos, ALT, AST, Total. Bili, TP); Future  - Lipid Profile (Chol, Trig, HDL, LDL calc); Future  - TSH with free T4 reflex  - Comprehensive metabolic panel (BMP + Alb, Alk Phos, ALT, AST, Total. Bili, TP)  - Lipid Profile (Chol, Trig, HDL, LDL calc)    Hypercholesteremia  Due for fasting lipid panel     Vitamin D deficiency  Due for   - DEXA HIP/PELVIS/SPINE - Future; Future    Pure hypercholesterolemia     - Comprehensive metabolic panel (BMP + Alb, Alk Phos, ALT, AST, Total. Bili, TP); Future  - Lipid Profile (Chol, Trig, HDL, LDL calc); Future    Asymptomatic menopausal state     - DEXA HIP/PELVIS/SPINE - Future; Future    Mild intermittent asthma, unspecified whether complicated   rarely flares but she like top keep an inhaler on hand.   - albuterol (PROAIR HFA) 108 (90 Base) MCG/ACT inhaler; Inhale 1-2 puffs into the lungs every 6 hours as needed for shortness of breath           BMI:   Estimated body mass index is 27.67 kg/m  as calculated from the following:    Height as of this encounter: 1.499 m (4' 11\").    Weight as of this encounter: 62.1 kg (137 lb).       Lucia Lind MD  [facility] 72 [facility], presenting for the following health issues:  RECHECK        6/27/2023    10:16 AM   Additional Questions   Roomed by LIZ Camilo LPN   Accompanied by self         6/27/2023    10:16 AM   Patient Reported Additional Medications   Patient reports taking the following new medications none     History of Present Illness       Hyperlipidemia:  She presents for follow up of hyperlipidemia.  She is taking medication to lower cholesterol. She is having myalgia or other side " "effects to statin medications.    Hypothyroidism:     Since last visit, patient describes the following symptoms::  None    Reason for visit:  Follow up    She eats 4 or more servings of fruits and vegetables daily.She consumes 0 sweetened beverage(s) daily.She exercises with enough effort to increase her heart rate 30 to 60 minutes per day.  She exercises with enough effort to increase her heart rate 5 days per week.   She is taking medications regularly.     In March she had a pneumonia with pleural effusion, chest pain fever and cough. She got better with antibiotics but cxr shows either some residual atelectasis or scarring. She is concerns. She has no problems with cough, shortness of breath, dyspnea on exertion wheezing, chest pain etc.         Review of Systems   Constitutional, HEENT, cardiovascular, pulmonary, GI, , musculoskeletal, neuro, skin, endocrine and psych systems are negative, except as otherwise noted.      Objective    /73   Pulse 98   Temp 97.5  F (36.4  C) (Oral)   Ht 1.499 m (4' 11\")   Wt 62.1 kg (137 lb)   LMP  (LMP Unknown)   SpO2 100%   Breastfeeding No   BMI 27.67 kg/m    Body mass index is 27.67 kg/m .  Physical Exam   GENERAL: healthy, alert and no distress  NECK: no adenopathy, no asymmetry, masses, or scars and thyroid normal to palpation  RESP: lungs clear to auscultation - no rales, rhonchi or wheezes  CV: regular rate and rhythm, normal S1 S2, no S3 or S4, no murmur, click or rub, no peripheral edema and peripheral pulses strong  ABDOMEN: soft, nontender, no hepatosplenomegaly, no masses and bowel sounds normal  MS: no gross musculoskeletal defects noted, no edema  NEURO: Normal strength and tone, mentation intact and speech normal  PSYCH: mentation appears normal, affect normal/bright              "

## 2023-06-28 LAB
ALBUMIN SERPL BCG-MCNC: 4.3 G/DL (ref 3.5–5.2)
ALP SERPL-CCNC: 114 U/L (ref 35–104)
ALT SERPL W P-5'-P-CCNC: 25 U/L (ref 0–50)
ANION GAP SERPL CALCULATED.3IONS-SCNC: 11 MMOL/L (ref 7–15)
AST SERPL W P-5'-P-CCNC: 23 U/L (ref 0–45)
BILIRUB SERPL-MCNC: 0.4 MG/DL
BUN SERPL-MCNC: 19.8 MG/DL (ref 8–23)
CALCIUM SERPL-MCNC: 9.5 MG/DL (ref 8.8–10.2)
CHLORIDE SERPL-SCNC: 106 MMOL/L (ref 98–107)
CHOLEST SERPL-MCNC: 324 MG/DL
CREAT SERPL-MCNC: 0.67 MG/DL (ref 0.51–0.95)
DEPRECATED HCO3 PLAS-SCNC: 23 MMOL/L (ref 22–29)
GFR SERPL CREATININE-BSD FRML MDRD: >90 ML/MIN/1.73M2
GLUCOSE SERPL-MCNC: 97 MG/DL (ref 70–99)
HDLC SERPL-MCNC: 38 MG/DL
LDLC SERPL CALC-MCNC: 225 MG/DL
NONHDLC SERPL-MCNC: 286 MG/DL
POTASSIUM SERPL-SCNC: 4.5 MMOL/L (ref 3.4–5.3)
PROT SERPL-MCNC: 6.9 G/DL (ref 6.4–8.3)
SODIUM SERPL-SCNC: 140 MMOL/L (ref 136–145)
TRIGL SERPL-MCNC: 303 MG/DL
TSH SERPL DL<=0.005 MIU/L-ACNC: 2.57 UIU/ML (ref 0.3–4.2)

## 2023-06-29 ENCOUNTER — TELEPHONE (OUTPATIENT)
Dept: INTERNAL MEDICINE | Facility: CLINIC | Age: 73
End: 2023-06-29
Payer: COMMERCIAL

## 2023-07-07 NOTE — TELEPHONE ENCOUNTER
Spoke with patient and informed of provider's message below,    Per Dr. Villalobos, he wanted patient to hold Lipitor x 1 month.  Patient has been off medication since 5/31/23 - reports rash is slightly better.  She will follow up with Dr. Villalobos to see what her next step is.

## 2023-07-07 NOTE — TELEPHONE ENCOUNTER
You can advise her that her blood sugar, kidney function test, liver tests are all essentially normal and stable.  Her LDL, bad cholesterol is extremely high.  It appears that she stopped her cholesterol medication due to rash.  It looks like cardiology wanted her to restart the medication in a month.  If she is not able to tolerate it, then she should call cardiology back about alternative treatment.

## 2023-07-13 ENCOUNTER — TELEPHONE (OUTPATIENT)
Dept: CARDIOLOGY | Facility: CLINIC | Age: 73
End: 2023-07-13
Payer: COMMERCIAL

## 2023-07-13 DIAGNOSIS — E78.00 HYPERCHOLESTEREMIA: Primary | ICD-10-CM

## 2023-07-13 DIAGNOSIS — E78.5 HYPERLIPIDEMIA LDL GOAL <130: ICD-10-CM

## 2023-07-13 NOTE — TELEPHONE ENCOUNTER
Patient called and advised RN she has not taken lipitor since seeing Dr. Villalobos on 5/31/23. Patient reports rash has improved, but is still slightly present. Patient reports she does not want to restart lipitor and does not want to try crestor. Patient is inquiring if Dr. Villalobos would be willing to prescribe Pravastatin and Vascepa. Patient reports her PMD recently thor an FLP (see below) and her numbers are elevated since stopping the lipitor. RN will send to Dr. Villalobos to inquire if ok to rx pravastatin and vascepa and if so, at what doses.           Component      Latest Ref Rng 6/27/2023  11:03 AM   Cholesterol      <200 mg/dL 324 (H)    Triglycerides      <150 mg/dL 303 (H)    HDL Cholesterol      >=50 mg/dL 38 (L)    LDL Cholesterol Calculated      <=100 mg/dL 225 (H)    Non HDL Cholesterol      <130 mg/dL 286 (H)       Legend:  (H) High  (L) Low

## 2023-07-13 NOTE — TELEPHONE ENCOUNTER
Health Call Center    Phone Message    May a detailed message be left on voicemail: yes     Reason for Call: Other: patient is calling and would like to speak to her care team regarding a medication she should stop per dr vargas, patient wasnt in much detailed as she is unsure herself what medication she is supposed to stop, please call patient to further discuss, thank you.     Action Taken: Other: cardiology    Travel Screening: Not Applicable   Thank you!  [facility]

## 2023-07-18 RX ORDER — PRAVASTATIN SODIUM 40 MG
40 TABLET ORAL DAILY
Qty: 90 TABLET | Refills: 3 | Status: SHIPPED | OUTPATIENT
Start: 2023-07-18 | End: 2024-04-22

## 2023-07-18 RX ORDER — ICOSAPENT ETHYL 1 G/1
2 CAPSULE ORAL 2 TIMES DAILY WITH MEALS
Qty: 360 CAPSULE | Refills: 3 | Status: SHIPPED | OUTPATIENT
Start: 2023-07-18 | End: 2024-04-22

## 2023-07-18 NOTE — TELEPHONE ENCOUNTER
RN called patient and updated her with plan. Patient verbalized understanding and is in agreement with plan. RN sent in new prescriptions and updated med list to reflect changes.         Sure, pravastatin 40 mg daily and Vascepa 2 g twice per day.  Thank you   Dr. Villalobos

## 2023-07-24 ENCOUNTER — PATIENT OUTREACH (OUTPATIENT)
Dept: CARE COORDINATION | Facility: CLINIC | Age: 73
End: 2023-07-24
Payer: COMMERCIAL

## 2023-08-07 ENCOUNTER — PATIENT OUTREACH (OUTPATIENT)
Dept: CARE COORDINATION | Facility: CLINIC | Age: 73
End: 2023-08-07
Payer: COMMERCIAL

## 2023-08-15 ENCOUNTER — ANCILLARY PROCEDURE (OUTPATIENT)
Dept: BONE DENSITY | Facility: CLINIC | Age: 73
End: 2023-08-15
Attending: INTERNAL MEDICINE
Payer: COMMERCIAL

## 2023-08-15 DIAGNOSIS — Z78.0 ASYMPTOMATIC MENOPAUSAL STATE: ICD-10-CM

## 2023-08-15 DIAGNOSIS — E55.9 VITAMIN D DEFICIENCY: ICD-10-CM

## 2023-08-15 PROCEDURE — 77085 DXA BONE DENSITY AXL VRT FX: CPT | Performed by: INTERNAL MEDICINE

## 2023-08-21 ENCOUNTER — TELEPHONE (OUTPATIENT)
Dept: INTERNAL MEDICINE | Facility: CLINIC | Age: 73
End: 2023-08-21
Payer: COMMERCIAL

## 2023-08-21 NOTE — TELEPHONE ENCOUNTER
Calling to see if Dr. Lind is still working.     Advised back on scheduled in [place] in September and then Monday, Tuesday and Wednesday  in [place] in oct and November.     She is asking who in [place] is taking new patients advised all provider except for Dr. Boss.     Patient denied any other questions.   Cary LARA RN   [facility] Triage

## 2023-09-27 ENCOUNTER — HOSPITAL ENCOUNTER (INPATIENT)
Facility: CLINIC | Age: 73
LOS: 2 days | Discharge: HOME | DRG: 072 | End: 2023-09-29
Attending: EMERGENCY MEDICINE | Admitting: INTERNAL MEDICINE
Payer: COMMERCIAL

## 2023-09-27 ENCOUNTER — APPOINTMENT (OUTPATIENT)
Dept: CT IMAGING | Facility: CLINIC | Age: 73
DRG: 072 | End: 2023-09-27
Attending: EMERGENCY MEDICINE
Payer: COMMERCIAL

## 2023-09-27 ENCOUNTER — APPOINTMENT (OUTPATIENT)
Dept: GENERAL RADIOLOGY | Facility: CLINIC | Age: 73
DRG: 072 | End: 2023-09-27
Attending: EMERGENCY MEDICINE
Payer: COMMERCIAL

## 2023-09-27 ENCOUNTER — APPOINTMENT (OUTPATIENT)
Dept: MRI IMAGING | Facility: CLINIC | Age: 73
DRG: 072 | End: 2023-09-27
Attending: EMERGENCY MEDICINE
Payer: COMMERCIAL

## 2023-09-27 DIAGNOSIS — G45.4 TRANSIENT GLOBAL AMNESIA: Primary | ICD-10-CM

## 2023-09-27 DIAGNOSIS — G93.40 ENCEPHALOPATHY: ICD-10-CM

## 2023-09-27 DIAGNOSIS — Z95.1 S/P CABG (CORONARY ARTERY BYPASS GRAFT): ICD-10-CM

## 2023-09-27 LAB
ALBUMIN UR-MCNC: NEGATIVE MG/DL
ANION GAP SERPL CALCULATED.3IONS-SCNC: 16 MMOL/L (ref 7–15)
APPEARANCE UR: CLEAR
ATRIAL RATE - MUSE: 63 BPM
BASOPHILS # BLD AUTO: 0 10E3/UL (ref 0–0.2)
BASOPHILS NFR BLD AUTO: 0 %
BILIRUB UR QL STRIP: NEGATIVE
BUN SERPL-MCNC: 17 MG/DL (ref 8–23)
CALCIUM SERPL-MCNC: 10 MG/DL (ref 8.8–10.2)
CHLORIDE SERPL-SCNC: 102 MMOL/L (ref 98–107)
COLOR UR AUTO: ABNORMAL
CREAT SERPL-MCNC: 0.72 MG/DL (ref 0.51–0.95)
DEPRECATED HCO3 PLAS-SCNC: 22 MMOL/L (ref 22–29)
DIASTOLIC BLOOD PRESSURE - MUSE: NORMAL MMHG
EGFRCR SERPLBLD CKD-EPI 2021: 88 ML/MIN/1.73M2
EOSINOPHIL # BLD AUTO: 0.1 10E3/UL (ref 0–0.7)
EOSINOPHIL NFR BLD AUTO: 1 %
ERYTHROCYTE [DISTWIDTH] IN BLOOD BY AUTOMATED COUNT: 12.8 % (ref 10–15)
GLUCOSE SERPL-MCNC: 106 MG/DL (ref 70–99)
GLUCOSE UR STRIP-MCNC: NEGATIVE MG/DL
HCT VFR BLD AUTO: 44.7 % (ref 35–47)
HGB BLD-MCNC: 15.3 G/DL (ref 11.7–15.7)
HGB UR QL STRIP: NEGATIVE
HOLD SPECIMEN: NORMAL
HOLD SPECIMEN: NORMAL
IMM GRANULOCYTES # BLD: 0 10E3/UL
IMM GRANULOCYTES NFR BLD: 0 %
INTERPRETATION ECG - MUSE: NORMAL
KETONES UR STRIP-MCNC: NEGATIVE MG/DL
LEUKOCYTE ESTERASE UR QL STRIP: NEGATIVE
LYMPHOCYTES # BLD AUTO: 1.9 10E3/UL (ref 0.8–5.3)
LYMPHOCYTES NFR BLD AUTO: 22 %
MCH RBC QN AUTO: 30.2 PG (ref 26.5–33)
MCHC RBC AUTO-ENTMCNC: 34.2 G/DL (ref 31.5–36.5)
MCV RBC AUTO: 88 FL (ref 78–100)
MONOCYTES # BLD AUTO: 0.4 10E3/UL (ref 0–1.3)
MONOCYTES NFR BLD AUTO: 5 %
MUCOUS THREADS #/AREA URNS LPF: PRESENT /LPF
NEUTROPHILS # BLD AUTO: 6.2 10E3/UL (ref 1.6–8.3)
NEUTROPHILS NFR BLD AUTO: 72 %
NITRATE UR QL: NEGATIVE
NRBC # BLD AUTO: 0 10E3/UL
NRBC BLD AUTO-RTO: 0 /100
P AXIS - MUSE: 56 DEGREES
PH UR STRIP: 7 [PH] (ref 5–7)
PLATELET # BLD AUTO: 290 10E3/UL (ref 150–450)
POTASSIUM SERPL-SCNC: 3.5 MMOL/L (ref 3.4–5.3)
PR INTERVAL - MUSE: 164 MS
QRS DURATION - MUSE: 92 MS
QT - MUSE: 442 MS
QTC - MUSE: 452 MS
R AXIS - MUSE: -25 DEGREES
RBC # BLD AUTO: 5.07 10E6/UL (ref 3.8–5.2)
RBC URINE: <1 /HPF
SODIUM SERPL-SCNC: 140 MMOL/L (ref 135–145)
SP GR UR STRIP: 1.02 (ref 1–1.03)
SYSTOLIC BLOOD PRESSURE - MUSE: NORMAL MMHG
T AXIS - MUSE: 16 DEGREES
TROPONIN T SERPL HS-MCNC: <6 NG/L
UROBILINOGEN UR STRIP-MCNC: NORMAL MG/DL
VENTRICULAR RATE- MUSE: 63 BPM
WBC # BLD AUTO: 8.6 10E3/UL (ref 4–11)
WBC URINE: <1 /HPF

## 2023-09-27 PROCEDURE — 250N000013 HC RX MED GY IP 250 OP 250 PS 637: Performed by: EMERGENCY MEDICINE

## 2023-09-27 PROCEDURE — 80048 BASIC METABOLIC PNL TOTAL CA: CPT | Performed by: EMERGENCY MEDICINE

## 2023-09-27 PROCEDURE — 36415 COLL VENOUS BLD VENIPUNCTURE: CPT | Performed by: EMERGENCY MEDICINE

## 2023-09-27 PROCEDURE — 250N000011 HC RX IP 250 OP 636: Performed by: EMERGENCY MEDICINE

## 2023-09-27 PROCEDURE — 99222 1ST HOSP IP/OBS MODERATE 55: CPT | Performed by: INTERNAL MEDICINE

## 2023-09-27 PROCEDURE — 81001 URINALYSIS AUTO W/SCOPE: CPT | Performed by: EMERGENCY MEDICINE

## 2023-09-27 PROCEDURE — 120N000001 HC R&B MED SURG/OB

## 2023-09-27 PROCEDURE — 70450 CT HEAD/BRAIN W/O DYE: CPT

## 2023-09-27 PROCEDURE — 71046 X-RAY EXAM CHEST 2 VIEWS: CPT

## 2023-09-27 PROCEDURE — 84484 ASSAY OF TROPONIN QUANT: CPT | Performed by: EMERGENCY MEDICINE

## 2023-09-27 PROCEDURE — 85025 COMPLETE CBC W/AUTO DIFF WBC: CPT | Performed by: EMERGENCY MEDICINE

## 2023-09-27 PROCEDURE — 250N000009 HC RX 250: Performed by: EMERGENCY MEDICINE

## 2023-09-27 PROCEDURE — 250N000011 HC RX IP 250 OP 636: Mod: JZ | Performed by: EMERGENCY MEDICINE

## 2023-09-27 PROCEDURE — 83036 HEMOGLOBIN GLYCOSYLATED A1C: CPT

## 2023-09-27 PROCEDURE — 99285 EMERGENCY DEPT VISIT HI MDM: CPT | Mod: 25

## 2023-09-27 PROCEDURE — 99207 PR NO BILLABLE SERVICE THIS VISIT: CPT | Performed by: INTERNAL MEDICINE

## 2023-09-27 PROCEDURE — 70551 MRI BRAIN STEM W/O DYE: CPT

## 2023-09-27 PROCEDURE — 255N000002 HC RX 255 OP 636: Performed by: EMERGENCY MEDICINE

## 2023-09-27 PROCEDURE — G0378 HOSPITAL OBSERVATION PER HR: HCPCS

## 2023-09-27 PROCEDURE — A9585 GADOBUTROL INJECTION: HCPCS | Performed by: EMERGENCY MEDICINE

## 2023-09-27 PROCEDURE — 93005 ELECTROCARDIOGRAM TRACING: CPT

## 2023-09-27 PROCEDURE — 96374 THER/PROPH/DIAG INJ IV PUSH: CPT | Mod: 59

## 2023-09-27 PROCEDURE — 70496 CT ANGIOGRAPHY HEAD: CPT

## 2023-09-27 RX ORDER — GADOBUTROL 604.72 MG/ML
6 INJECTION INTRAVENOUS ONCE
Status: COMPLETED | OUTPATIENT
Start: 2023-09-27 | End: 2023-09-27

## 2023-09-27 RX ORDER — CARVEDILOL 6.25 MG/1
6.25 TABLET ORAL ONCE
Status: DISCONTINUED | OUTPATIENT
Start: 2023-09-27 | End: 2023-09-27

## 2023-09-27 RX ORDER — HYDRALAZINE HYDROCHLORIDE 10 MG/1
10 TABLET, FILM COATED ORAL ONCE
Status: COMPLETED | OUTPATIENT
Start: 2023-09-27 | End: 2023-09-27

## 2023-09-27 RX ORDER — ONDANSETRON 2 MG/ML
4 INJECTION INTRAMUSCULAR; INTRAVENOUS ONCE
Status: COMPLETED | OUTPATIENT
Start: 2023-09-27 | End: 2023-09-27

## 2023-09-27 RX ORDER — IOPAMIDOL 755 MG/ML
67 INJECTION, SOLUTION INTRAVASCULAR ONCE
Status: COMPLETED | OUTPATIENT
Start: 2023-09-27 | End: 2023-09-27

## 2023-09-27 RX ADMIN — ONDANSETRON 4 MG: 2 INJECTION INTRAMUSCULAR; INTRAVENOUS at 22:36

## 2023-09-27 RX ADMIN — SODIUM CHLORIDE 90 ML: 9 INJECTION, SOLUTION INTRAVENOUS at 18:33

## 2023-09-27 RX ADMIN — HYDRALAZINE HYDROCHLORIDE 10 MG: 10 TABLET ORAL at 21:50

## 2023-09-27 RX ADMIN — GADOBUTROL 6 ML: 604.72 INJECTION INTRAVENOUS at 22:30

## 2023-09-27 RX ADMIN — IOPAMIDOL 67 ML: 755 INJECTION, SOLUTION INTRAVENOUS at 18:32

## 2023-09-27 ASSESSMENT — ACTIVITIES OF DAILY LIVING (ADL)
ADLS_ACUITY_SCORE: 35

## 2023-09-27 NOTE — ED PROVIDER NOTES
History     Chief Complaint:  Chest Pain       The history is provided by a significant other.      Kalani Rowan is a 73 year old female presents with AMS.  She presents with  at the bedside.   states that patient history of hypertension as well as a CABG a year ago.  He states that typically patient is alert and oriented however today she seemed more altered.  She was confused.  States that she did not know what day it was.  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  He states that patient has similar episode a few years ago and was hospitalized for this.  She reports no falls or head injury.        Independent Historian:   Spouse/Partner - They report above history    Review of External Notes:   Medical records November 30, 2019 patient was discharged initially presented with expressive aphasia, hypertensive emergency.  Thought to have hypertensive encephalopathy.      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  Allergic rhinitis due to pollen  Alopecia  Anaphylaxis  Asthma  Essential hypertension  Hearing loss  Hemorrhoids  Hip abductor tendinitis, left  History of infectious disease  History of stroke without residual deficits  Seborrheic keratosis  It band syndrome, left  Segmental and somatic dysfunction  Somatic dysfunction of lumbar region  Sprain of deltoid ligament of ankle  Uterine leiomyoma  Vitamin D deficiency    Past Surgical History:    Coronary artery bypass graft  CV coronary angiogram  CV left heart cath  Right tracheotomy  ZZC nonspecific procedure, left foot cyst    Physical Exam   Patient Vitals for the past 24 hrs:   BP  "Temp Temp src Pulse Resp SpO2 Height Weight   09/27/23 2130 (!) 141/81 -- -- 71 19 95 % -- --   09/27/23 2100 (!) 149/72 -- -- 66 15 95 % -- --   09/27/23 2030 (!) 166/89 -- -- 66 16 97 % -- --   09/27/23 2015 (!) 152/78 -- -- 69 14 96 % -- --   09/27/23 1930 (!) 145/89 -- -- 67 16 96 % -- --   09/27/23 1725 (!) 175/85 -- -- 66 13 97 % -- --   09/27/23 1700 (!) 189/97 -- -- 78 13 99 % -- --   09/27/23 1630 (!) 190/86 98.2  F (36.8  C) Temporal 66 16 96 % 1.499 m (4' 11\") 62.1 kg (137 lb)      Physical Exam  Vitals reviewed.   Constitutional:       General: She is not in acute distress.     Appearance: She is not ill-appearing.   HENT:      Head: Normocephalic and atraumatic.   Eyes:      Extraocular Movements: Extraocular movements intact.   Cardiovascular:      Rate and Rhythm: Normal rate and regular rhythm.   Pulmonary:      Effort: Pulmonary effort is normal. No respiratory distress.      Breath sounds: Normal breath sounds. No wheezing.   Abdominal:      Palpations: Abdomen is soft.      Tenderness: There is no abdominal tenderness. There is no guarding.   Musculoskeletal:      Cervical back: Normal range of motion.   Skin:     General: Skin is warm and dry.   Neurological:      Mental Status: She is alert.      GCS: GCS eye subscore is 4. GCS verbal subscore is 5. GCS motor subscore is 6.      Comments: Alert, oriented to person and place  MENTAL STATUS: A/O x 4, speech is fluent and spontaneous.  CRANIAL NERVES: CN II: No visual field deficits. Pupils are reactive to light. III, IV, VI: Extraocular muscles are intact. V: Facial sensation is equal bilaterally. VII: Eyebrow raise and smile are equal bilaterally. IX and X: Palate elevation is equal. XI: Shoulder shrug is equal. XII: Tongue protrusion without deviation.  SENSORY: Sensation is intact to light touch.   MOTOR: No Pronator drift. No atrophy. Normal muscle tone. 5/5 MS in the UE and LE bilat.     Psychiatric:         Behavior: Behavior normal. "       Emergency Department Course   ECG  ECG taken at 1629, ECG read at 1637  Normal sinus rhythm  Possible Left atrial enlargement  Incomplete right bundle branch block  Minimla voltage criteria for LVH, may be normal variant (R in a VL)  Septal infarct, age undetermined  Abnormal ECG   Rate 63 bpm. IA interval 164 ms. QRS duration 92 ms. QT/QTc 442/452 ms. P-R-T axes 56 -25 16.     Imaging:  Head CT w/o contrast   Final Result   IMPRESSION:    HEAD CT:   1.  No CT evidence for acute intracranial process.   2.  Brain atrophy and presumed chronic microvascular ischemic changes as above.      HEAD CTA:    1.  Normal CTA [place].      NECK CTA:   1.  Focal moderate to severe stenosis of the right vertebral artery as described above. There is grossly similar prior.   2.  50% stenosis of the right cervical internal carotid artery origin.      CTA Head Neck with Contrast   Final Result   IMPRESSION:    HEAD CT:   1.  No CT evidence for acute intracranial process.   2.  Brain atrophy and presumed chronic microvascular ischemic changes as above.      HEAD CTA:    1.  Normal CTA [place].      NECK CTA:   1.  Focal moderate to severe stenosis of the right vertebral artery as described above. There is grossly similar prior.   2.  50% stenosis of the right cervical internal carotid artery origin.      Chest XR,  PA & LAT   Final Result   IMPRESSION: Sternotomy. Heart normal in size. Lungs are clear.      MR Brain w/o & w Contrast    (Results Pending)      Report per radiology    Laboratory:  Labs Ordered and Resulted from Time of ED Arrival to Time of ED Departure   BASIC METABOLIC PANEL - Abnormal       Result Value    Sodium 140      Potassium 3.5      Chloride 102      Carbon Dioxide (CO2) 22      Anion Gap 16 (*)     Urea Nitrogen 17.0      Creatinine 0.72      GFR Estimate 88      Calcium 10.0      Glucose 106 (*)    ROUTINE UA WITH MICROSCOPIC REFLEX TO CULTURE - Abnormal    Color Urine Light Yellow       Appearance Urine Clear      Glucose Urine Negative      Bilirubin Urine Negative      Ketones Urine Negative      Specific Gravity Urine 1.020      Blood Urine Negative      pH Urine 7.0      Protein Albumin Urine Negative      Urobilinogen Urine Normal      Nitrite Urine Negative      Leukocyte Esterase Urine Negative      Mucus Urine Present (*)     RBC Urine <1      WBC Urine <1     TROPONIN T, HIGH SENSITIVITY - Normal    Troponin T, High Sensitivity <6     CBC WITH PLATELETS AND DIFFERENTIAL    WBC Count 8.6      RBC Count 5.07      Hemoglobin 15.3      Hematocrit 44.7      MCV 88      MCH 30.2      MCHC 34.2      RDW 12.8      Platelet Count 290      % Neutrophils 72      % Lymphocytes 22      % Monocytes 5      % Eosinophils 1      % Basophils 0      % Immature Granulocytes 0      NRBCs per 100 WBC 0      Absolute Neutrophils 6.2      Absolute Lymphocytes 1.9      Absolute Monocytes 0.4      Absolute Eosinophils 0.1      Absolute Basophils 0.0      Absolute Immature Granulocytes 0.0      Absolute NRBCs 0.0        Emergency Department Course & Assessments:    Interventions:  Medications   ondansetron (ZOFRAN) injection 4 mg (4 mg Intravenous Not Given 9/27/23 2109)   iopamidol (ISOVUE-370) solution 67 mL (67 mLs Intravenous $Given 9/27/23 1832)   Saline Flush (90 mLs Intravenous $Given 9/27/23 1833)   hydrALAZINE (APRESOLINE) tablet 10 mg (10 mg Oral $Given 9/27/23 2150)        Assessments:  See  ED course    Independent Interpretation (X-rays, CTs, rhythm strip):  None    Consultations/Discussion of Management or Tests:  ED Course as of 09/27/23 2152   Wed Sep 27, 2023   1957 Reassessed pt. She continues to be confused. States she can urinate now.    2041 Discussed with Dr. Hampton, recommends admission for encephalopathy. EEG and MRI brain with and without   2133 I spoke with Dr. Bosch of the hospitalist team regarding the patient, who accepted the patient for admission.    Social Determinants of Health  affecting care:   None    Disposition:  The patient was admitted to the hospital under the care of Dr. Bosch.     Impression & Plan        Medical Decision Makin-year-old female presenting today with altered mental status.   at the bedside states that patient has been more confused today.  States that has had no recent medication changes.  Reports been compliant with her blood pressure medications including Coreg.  States that this afternoon patient seemed to be more confused.  States that she did not know what day it was.  Today is her birthday.   was concern for possible cardiac problem and gave aspirin and nitroglycerin prior to arrival.  Patient reports no chest pain at this time.  At this time she is able to follow commands, she has 5-5 strength of bilateral upper and lower extremities.  Her speech is clear.  She is noted to be confused, repeating herself and asking why she is here.  CT CTA of the head and neck was ordered as well as blood work.  Imaging studies as noted above.  I did discuss case with neuro stroke Dr. Hampton.  He evaluated the patient through the telestroke computer.  He spoke to patient and family at length.  He recommends EEG and MRI of the brain.  Neurology team will see her in the morning.  Case was discussed with hospitalist.      Diagnosis:    ICD-10-CM    1. Encephalopathy  G93.40            Scribe Disclosure:  I, Jatin Hand, am serving as a scribe at 6:09 PM on 2023 to document services personally performed by Yolanda Rasmussen DO, based on my observations and the provider's statements to me.   2023   Yolanda Rasmussen DO Doan, Tiffani, DO  23 2156

## 2023-09-27 NOTE — ED NOTES
Bed: ED07  Expected date:   Expected time:   Means of arrival:   Comments:  Zulema 541 73 F chest pain and nausea eta 1627

## 2023-09-27 NOTE — ED TRIAGE NOTES
Pt BIBA from home- it I her bday today too . Pt having soniya e nausea and did not feel well after shopping today .   She had something similar happen last year where she ended up having 3 very high percentage blocked vessels and ended up with open heart surgery . EKG had looked normal at that time per her report as well . Pt is currently pain free, not short of breath and vss in lieu of elevated bp   190/86. On room air .     Pt took own NTG at home and also 4 asa

## 2023-09-28 ENCOUNTER — APPOINTMENT (OUTPATIENT)
Dept: CARDIOLOGY | Facility: CLINIC | Age: 73
DRG: 072 | End: 2023-09-28
Attending: INTERNAL MEDICINE
Payer: COMMERCIAL

## 2023-09-28 ENCOUNTER — HOSPITAL ENCOUNTER (INPATIENT)
Dept: NEUROLOGY | Facility: CLINIC | Age: 73
Discharge: HOME | DRG: 072 | End: 2023-09-28
Attending: INTERNAL MEDICINE
Payer: COMMERCIAL

## 2023-09-28 LAB
ALBUMIN SERPL BCG-MCNC: 4.4 G/DL (ref 3.5–5.2)
ALP SERPL-CCNC: 122 U/L (ref 35–104)
ALT SERPL W P-5'-P-CCNC: 26 U/L (ref 0–50)
ANION GAP SERPL CALCULATED.3IONS-SCNC: 16 MMOL/L (ref 7–15)
AST SERPL W P-5'-P-CCNC: 22 U/L (ref 0–45)
BILIRUB SERPL-MCNC: 0.4 MG/DL
BUN SERPL-MCNC: 16.2 MG/DL (ref 8–23)
CALCIUM SERPL-MCNC: 10 MG/DL (ref 8.8–10.2)
CHLORIDE SERPL-SCNC: 105 MMOL/L (ref 98–107)
CHOLEST SERPL-MCNC: 265 MG/DL
CREAT SERPL-MCNC: 0.74 MG/DL (ref 0.51–0.95)
EGFRCR SERPLBLD CKD-EPI 2021: 85 ML/MIN/1.73M2
FOLATE SERPL-MCNC: >40 NG/ML (ref 4.6–34.8)
GLUCOSE SERPL-MCNC: 111 MG/DL (ref 70–99)
HBA1C MFR BLD: 5.8 %
HCO3 SERPL-SCNC: 20 MMOL/L (ref 22–29)
HDLC SERPL-MCNC: 49 MG/DL
LDLC SERPL CALC-MCNC: 165 MG/DL
LVEF ECHO: NORMAL
NONHDLC SERPL-MCNC: 216 MG/DL
POTASSIUM SERPL-SCNC: 3.7 MMOL/L (ref 3.4–5.3)
PROT SERPL-MCNC: 7.1 G/DL (ref 6.4–8.3)
SODIUM SERPL-SCNC: 141 MMOL/L (ref 135–145)
T4 FREE SERPL-MCNC: 1.16 NG/DL (ref 0.9–1.7)
TRIGL SERPL-MCNC: 254 MG/DL
TSH SERPL DL<=0.005 MIU/L-ACNC: 4.67 UIU/ML (ref 0.3–4.2)
VIT B12 SERPL-MCNC: 644 PG/ML (ref 232–1245)

## 2023-09-28 PROCEDURE — 99232 SBSQ HOSP IP/OBS MODERATE 35: CPT | Performed by: STUDENT IN AN ORGANIZED HEALTH CARE EDUCATION/TRAINING PROGRAM

## 2023-09-28 PROCEDURE — 95816 EEG AWAKE AND DROWSY: CPT

## 2023-09-28 PROCEDURE — 250N000013 HC RX MED GY IP 250 OP 250 PS 637: Performed by: INTERNAL MEDICINE

## 2023-09-28 PROCEDURE — 250N000013 HC RX MED GY IP 250 OP 250 PS 637: Performed by: STUDENT IN AN ORGANIZED HEALTH CARE EDUCATION/TRAINING PROGRAM

## 2023-09-28 PROCEDURE — 84443 ASSAY THYROID STIM HORMONE: CPT | Performed by: INTERNAL MEDICINE

## 2023-09-28 PROCEDURE — 84439 ASSAY OF FREE THYROXINE: CPT | Performed by: INTERNAL MEDICINE

## 2023-09-28 PROCEDURE — 93306 TTE W/DOPPLER COMPLETE: CPT | Mod: 26 | Performed by: INTERNAL MEDICINE

## 2023-09-28 PROCEDURE — G0378 HOSPITAL OBSERVATION PER HR: HCPCS

## 2023-09-28 PROCEDURE — 82607 VITAMIN B-12: CPT | Performed by: INTERNAL MEDICINE

## 2023-09-28 PROCEDURE — 36415 COLL VENOUS BLD VENIPUNCTURE: CPT | Performed by: INTERNAL MEDICINE

## 2023-09-28 PROCEDURE — 95816 EEG AWAKE AND DROWSY: CPT | Mod: 26 | Performed by: PSYCHIATRY & NEUROLOGY

## 2023-09-28 PROCEDURE — 80061 LIPID PANEL: CPT

## 2023-09-28 PROCEDURE — 93306 TTE W/DOPPLER COMPLETE: CPT

## 2023-09-28 PROCEDURE — 120N000001 HC R&B MED SURG/OB

## 2023-09-28 PROCEDURE — 80053 COMPREHEN METABOLIC PANEL: CPT | Performed by: INTERNAL MEDICINE

## 2023-09-28 PROCEDURE — 82746 ASSAY OF FOLIC ACID SERUM: CPT | Performed by: INTERNAL MEDICINE

## 2023-09-28 RX ORDER — ONDANSETRON 2 MG/ML
4 INJECTION INTRAMUSCULAR; INTRAVENOUS EVERY 6 HOURS PRN
Status: DISCONTINUED | OUTPATIENT
Start: 2023-09-28 | End: 2023-09-29 | Stop reason: HOSPADM

## 2023-09-28 RX ORDER — CARVEDILOL 6.25 MG/1
6.25 TABLET ORAL 2 TIMES DAILY WITH MEALS
Status: DISCONTINUED | OUTPATIENT
Start: 2023-09-28 | End: 2023-09-29 | Stop reason: HOSPADM

## 2023-09-28 RX ORDER — AMOXICILLIN 250 MG
1 CAPSULE ORAL 2 TIMES DAILY PRN
Status: DISCONTINUED | OUTPATIENT
Start: 2023-09-28 | End: 2023-09-29 | Stop reason: HOSPADM

## 2023-09-28 RX ORDER — AMOXICILLIN 250 MG
2 CAPSULE ORAL 2 TIMES DAILY PRN
Status: DISCONTINUED | OUTPATIENT
Start: 2023-09-28 | End: 2023-09-29 | Stop reason: HOSPADM

## 2023-09-28 RX ORDER — AMOXICILLIN 250 MG
1 CAPSULE ORAL 2 TIMES DAILY
Status: DISCONTINUED | OUTPATIENT
Start: 2023-09-28 | End: 2023-09-29 | Stop reason: HOSPADM

## 2023-09-28 RX ORDER — HYDRALAZINE HYDROCHLORIDE 20 MG/ML
5 INJECTION INTRAMUSCULAR; INTRAVENOUS EVERY 4 HOURS PRN
Status: DISCONTINUED | OUTPATIENT
Start: 2023-09-28 | End: 2023-09-29 | Stop reason: HOSPADM

## 2023-09-28 RX ORDER — AMOXICILLIN 250 MG
2 CAPSULE ORAL 2 TIMES DAILY
Status: DISCONTINUED | OUTPATIENT
Start: 2023-09-28 | End: 2023-09-29 | Stop reason: HOSPADM

## 2023-09-28 RX ORDER — ASPIRIN 81 MG/1
162 TABLET, CHEWABLE ORAL DAILY
Status: DISCONTINUED | OUTPATIENT
Start: 2023-09-28 | End: 2023-09-29 | Stop reason: HOSPADM

## 2023-09-28 RX ORDER — ONDANSETRON 4 MG/1
4 TABLET, ORALLY DISINTEGRATING ORAL EVERY 6 HOURS PRN
Status: DISCONTINUED | OUTPATIENT
Start: 2023-09-28 | End: 2023-09-29 | Stop reason: HOSPADM

## 2023-09-28 RX ORDER — PRAVASTATIN SODIUM 40 MG
40 TABLET ORAL DAILY
Status: DISCONTINUED | OUTPATIENT
Start: 2023-09-28 | End: 2023-09-29 | Stop reason: HOSPADM

## 2023-09-28 RX ADMIN — CARVEDILOL 6.25 MG: 6.25 TABLET, FILM COATED ORAL at 18:14

## 2023-09-28 RX ADMIN — ASPIRIN 81 MG 162 MG: 81 TABLET ORAL at 08:54

## 2023-09-28 RX ADMIN — CARVEDILOL 6.25 MG: 6.25 TABLET, FILM COATED ORAL at 08:54

## 2023-09-28 RX ADMIN — SENNOSIDES AND DOCUSATE SODIUM 2 TABLET: 50; 8.6 TABLET ORAL at 20:07

## 2023-09-28 RX ADMIN — SENNOSIDES AND DOCUSATE SODIUM 1 TABLET: 50; 8.6 TABLET ORAL at 08:54

## 2023-09-28 RX ADMIN — PRAVASTATIN SODIUM 40 MG: 40 TABLET ORAL at 08:54

## 2023-09-28 ASSESSMENT — ACTIVITIES OF DAILY LIVING (ADL)
ADLS_ACUITY_SCORE: 18
ADLS_ACUITY_SCORE: 20
CHANGE_IN_FUNCTIONAL_STATUS_SINCE_ONSET_OF_CURRENT_ILLNESS/INJURY: NO
DRESSING/BATHING_DIFFICULTY: NO
ADLS_ACUITY_SCORE: 18
ADLS_ACUITY_SCORE: 20
WALKING_OR_CLIMBING_STAIRS_DIFFICULTY: NO
ADLS_ACUITY_SCORE: 20
CONCENTRATING,_REMEMBERING_OR_MAKING_DECISIONS_DIFFICULTY: NO
ADLS_ACUITY_SCORE: 20
ADLS_ACUITY_SCORE: 35
ADLS_ACUITY_SCORE: 20
DOING_ERRANDS_INDEPENDENTLY_DIFFICULTY: NO
DIFFICULTY_EATING/SWALLOWING: NO
ADLS_ACUITY_SCORE: 20
ADLS_ACUITY_SCORE: 18
ADLS_ACUITY_SCORE: 18
FALL_HISTORY_WITHIN_LAST_SIX_MONTHS: NO
ADLS_ACUITY_SCORE: 18
TOILETING_ISSUES: NO
WEAR_GLASSES_OR_BLIND: NO

## 2023-09-28 NOTE — PHARMACY-ADMISSION MEDICATION HISTORY
Pharmacist Admission Medication History    Admission medication history is complete. The information provided in this note is only as accurate as the sources available at the time of the update.    Medication reconciliation/reorder completed by provider prior to medication history? yes    Information Source(s): Spouse and CareEverywhere/SureScripts via phone    Pertinent Information: none    Changes made to PTA medication list:  Added: None  Deleted: senna-docusate  Changed: None    Medication Affordability:  Not including over the counter (OTC) medications, was there a time in the past 3 months when you did not take your medications as prescribed because of cost?: Unable to Assess    Allergies reviewed with patient and updates made in EHR: no    Medication History Completed By: Goldie Vidales [facility] 9/28/2023 8:19 AM    Prior to Admission medications    Medication Sig Last Dose Taking? Auth Provider Long Term End Date   acetaminophen (TYLENOL) 325 MG tablet Take 2 tablets (650 mg) by mouth every 6 hours as needed for other (For optimal non-opioid multimodal pain management to improve pain control.)  Yes Sophie Stanley PA-C     albuterol (PROAIR HFA) 108 (90 Base) MCG/ACT inhaler Inhale 1-2 puffs into the lungs every 6 hours as needed for shortness of breath  Yes Lucia Lind MD Yes    aspirin (ASA) 81 MG chewable tablet Take 162 mg by mouth every evening  Yes Lucia Lind MD     carvedilol (COREG) 6.25 MG tablet Take 1 tablet (6.25 mg) by mouth 2 times daily (with meals)  Yes Ever Villalobos MD Yes    Cholecalciferol (VITAMIN D3) 1.25 MG (79395 UT) TABS Take 5,000 mcg by mouth daily  Yes Reported, Patient     Coenzyme Q10 (COQ10) 100 MG CAPS Take 100 mg by mouth daily  Yes Reported, Patient     fluticasone (FLONASE) 50 MCG/ACT nasal spray Spray 50 mcg in nostril as needed for allergies  Yes Reported, Patient     icosapent ethyl (VASCEPA) 1 g CAPS capsule Take 2 g by mouth 2 times daily (with  meals)  Yes Ever Villalobos MD Yes    Multiple Vitamin (MULTIVITAMIN ADULT PO) Take 1 tablet by mouth daily  Yes Reported, Patient     Multiple Vitamins-Minerals (PRESERVISION AREDS) CAPS Take 1 capsule by mouth daily  Yes Lucia Lind MD     multivitamin w/minerals (THERA-VIT-M) tablet Take 1 tablet by mouth daily  Yes Reported, Patient     nitroGLYcerin (NITROSTAT) 0.4 MG sublingual tablet For chest pain place 1 tablet under the tongue every 5 minutes for 3 doses. If symptoms persist 5 minutes after 1st dose call 911.  Yes Ever Villalobos MD Yes    polyethylene glycol (MIRALAX) 17 GM/Dose powder Take 17 g by mouth daily as needed for constipation  Yes Sophie Stanley PA-C     pravastatin (PRAVACHOL) 40 MG tablet Take 1 tablet (40 mg) by mouth daily  Yes Ever Villalobos MD Yes

## 2023-09-28 NOTE — PLAN OF CARE
Goal Outcome Evaluation:  Summary:  Altered mental status  DATE & TIME: 9/28/23 4548-1973    Cognitive Concerns/ Orientation : A&O x4 with some confusion  BEHAVIOR & AGGRESSION TOOL COLOR: Green  CIWA SCORE: NA   ABNL VS/O2: VSS on RA  MOBILITY: SBA  PAIN MANAGMENT: Denied  DIET: Regular  BOWEL/BLADDER: Continent of B/B, no BM this shift  ABNL LAB/BG: None this shift  DRAIN/DEVICES: PIV SL  TELEMETRY RHYTHM: NSR  SKIN: WDL, Healed surgical scar on chest, pt stated it's from heart surgery  TESTS/PROCEDURES: EEG scheduled today 9/28, time unknown  D/C DAY/GOALS/PLACE: Pending improvement  OTHER IMPORTANT INFO:

## 2023-09-28 NOTE — CONSULTS
"[facility]    Stroke Consult Note    Reason for Consult:  confusion    Chief Complaint: Chest Pain       HPI  Kalani Rowan is a 73 year old woman with hx of CAD s/p CABG an year ago, hypothyroidism, HTN, HLD, CKD,TIA in 2019, asthma, FH of stroke in mom at 71 years of age coming with global amnesia. Family reports that she has been looping. They report somewhat physical stress over the week.    CTA right carotid stenosis.     stroke risk factors- reviewed and none of the below  CKD  Recent surgery  CHF  PVD  DVT/PE  Valve disease  Active cancer  PABLO    SH:  Smoking - none  Alcohol - none  IV drug use - none  Used to work as a nurse.    Impression  TGA vs stroke    Recommendations   MRI brain wo contrast- if any stroke can do a full stroke work up.   Routine EEG.  TSH, Vit B12, folate, ua  Continue ASA + statin.  Can follow up with her vascular surgeon for the carotid stenosis.    Patient Follow-up    - final recommendation pending work-up    Thank you for this consult.    Erik Hampton MD  _____________________________________________________    Clinically Significant Risk Factors Present on Admission                # Drug Induced Platelet Defect: home medication list includes an antiplatelet medication   # Hypertension: Noted on problem list      # Overweight: Estimated body mass index is 27.67 kg/m  as calculated from the following:    Height as of this encounter: 1.499 m (4' 11\").    Weight as of this encounter: 62.1 kg (137 lb).       # Asthma: noted on problem list  # History of CABG: noted on surgical history       Past Medical History    Past Medical History:   Diagnosis Date     Allergy, unspecified not elsewhere classified      Medications   Home Meds  Prior to Admission medications    Medication Sig Start Date End Date Taking? Authorizing Provider   acetaminophen (TYLENOL) 325 MG tablet Take 2 tablets (650 mg) by mouth every 6 hours as needed for other (For optimal " non-opioid multimodal pain management to improve pain control.) 9/9/22   Sophie Stanley PA-C   albuterol (PROAIR HFA) 108 (90 Base) MCG/ACT inhaler Inhale 1-2 puffs into the lungs every 6 hours as needed for shortness of breath 6/27/23   Lucia Lind MD   ALBUTEROL SULFATE IN 90 mcg    Reported, Patient   aspirin (ASA) 81 MG chewable tablet Take 2 tablets (162 mg) by mouth daily 12/28/22   Lucia Lind MD   carvedilol (COREG) 6.25 MG tablet Take 1 tablet (6.25 mg) by mouth 2 times daily (with meals) 6/14/23   Ever Villalobos MD   Cholecalciferol (VITAMIN D3) 1.25 MG (33882 UT) TABS 5,000 mcg 7/13/22   Reported, Patient   Coenzyme Q10 (COQ10) 100 MG CAPS     Reported, Patient   fluticasone (FLONASE) 50 MCG/ACT nasal spray 50 mcg    Reported, Patient   icosapent ethyl (VASCEPA) 1 g CAPS capsule Take 2 g by mouth 2 times daily (with meals) 7/18/23   Ever Villalobos MD   Multiple Vitamin (MULTIVITAMIN ADULT PO)     Reported, Patient   Multiple Vitamins-Minerals (PRESERVISION AREDS) CAPS  12/28/22   Lucia Lind MD   multivitamin w/minerals (THERA-VIT-M) tablet Take 1 tablet by mouth daily    Reported, Patient   nitroGLYcerin (NITROSTAT) 0.4 MG sublingual tablet For chest pain place 1 tablet under the tongue every 5 minutes for 3 doses. If symptoms persist 5 minutes after 1st dose call 911. 12/13/22   Ever Villalobos MD   polyethylene glycol (MIRALAX) 17 GM/Dose powder Take 17 g by mouth daily as needed for constipation 9/9/22   Sophie Stanley PA-C   pravastatin (PRAVACHOL) 40 MG tablet Take 1 tablet (40 mg) by mouth daily 7/18/23   Ever Villalobos MD   senna-docusate (SENOKOT-S/PERICOLACE) 8.6-50 MG tablet Take 1 tablet by mouth 2 times daily 9/9/22   Sophie Stanley PA-C       Scheduled Meds    ondansetron  4 mg Intravenous Once       Infusion Meds      Allergies   Allergies   Allergen Reactions     Elderberry [Sambucus Canadensis] Anaphylaxis     Elderberry syrup-  ER.      Codeine      Allen Nite Time Itching     Other Environmental Allergy      Pine sol causes anylphylaxis     Atorvastatin Rash           PHYSICAL EXAMINATION   Temp:  [98.2  F (36.8  C)] 98.2  F (36.8  C)  Pulse:  [66-78] 67  Resp:  [13-16] 16  BP: (145-190)/(85-97) 145/89  SpO2:  [96 %-99 %] 96 %    General Exam  General:  patient lying in bed without any acute distress    HEENT:  normocephalic/atraumatic  Cardio:  RRR  Pulmonary:  no respiratory distress  Abdomen:  non-distended  Extremities:  no edema  Skin:  intact     Neuro Exam  Mental Status:  Alert, not oriented, follows commands, speech clear and fluent, naming and repetition normal  Cranial Nerves:  visual fields intact (tested by nurse), EOMI with normal smooth pursuit, facial sensation intact and symmetric (tested by nurse), facial movements symmetric, hearing not formally tested but intact to conversation, no dysarthria, shoulder shrug equal bilaterally, tongue protrusion midline  Motor:  no abnormal movements, able to move all limbs antigravity spontaneously with no signs of hemiparesis observed, no pronator drift   Reflexes:  unable to test (telestroke)  Sensory:  light touch sensation intact and symmetric throughout upper and lower extremities (assessed by nurse)  Coordination:  normal finger-to-nose and heel-to-shin bilaterally without dysmetria, rapid alternating movements symmetric  Station/Gait:  unable to test due to telestroke    Stroke Scales    NIHSS  1a. Level of Consciousness 0-->Alert, keenly responsive   1b. LOC Questions 1-->Answers one question correctly   1c. LOC Commands 0-->Performs both tasks correctly   2.   Best Gaze 0-->Normal   3.   Visual 0-->No visual loss   4.   Facial Palsy 0-->Normal symmetrical movements   5a. Motor Arm, Left 0-->No drift, limb holds 90 (or 45) degrees for full 10 secs   5b. Motor Arm, Right 0-->No drift, limb holds 90 (or 45) degrees for full 10 secs   6a. Motor Leg, Left 0-->No drift, leg holds 30  degree position for full 5 secs   6b. Motor Leg, right 0-->No drift, leg holds 30 degree position for full 5 secs   7.   Limb Ataxia 0-->Absent   8.   Sensory 0-->Normal, no sensory loss   9.   Best Language 0-->No aphasia, normal   10. Dysarthria 0-->Normal   11. Extinction and Inattention  0-->No abnormality   Total 1 (09/27/23 2007)       Imaging  I personally reviewed all imaging; relevant findings per HPI.    Labs Data   CBC  Recent Labs   Lab 09/27/23  1651   WBC 8.6   RBC 5.07   HGB 15.3   HCT 44.7        Basic Metabolic Panel   Recent Labs   Lab 09/27/23  1651      POTASSIUM 3.5   CHLORIDE 102   CO2 22   BUN 17.0   CR 0.72   *   APRIL 10.0     Liver Panel  No results for input(s): PROTTOTAL, ALBUMIN, BILITOTAL, ALKPHOS, AST, ALT, BILIDIRECT in the last 168 hours.  INR    Recent Labs   Lab Test 09/02/22  1429 09/02/22  1226 11/02/19  1406   INR 1.26* 1.55* 0.88           Stroke Consult Data Data   Telestroke Service Details  (for non-emergent stroke consult with tele)  Video start time         Video end time         Type of service telemedicine diagnostic assessment of acute neurological changes   Reason telemedicine is appropriate patient requires assessment with a specialist for diagnosis and treatment of neurological symptoms   Mode of transmission secure interactive audio and video communication per [place]   Originating site (patient location) [facility]    Distant site (provider location) Provider remote site     I have personally spent a total of 60 minutes providing care today, time spent in reviewing medical records and reviewing tests, examining the patient and obtaining history, coordination of care, and discussion with the patient and/or family regarding diagnostic results, prognosis, symptom management, risks and benefits of management options, and development of plan of care. Greater than 50% was spent in counseling and coordination of care.

## 2023-09-28 NOTE — H&P
[facility]    History and Physical - Hospitalist Service       Date of Admission:  9/27/2023    Assessment & Plan      Kalani Rowan is a 73 year old female with past medical hx of CAD, s/p 3v-CABG,HTN, hyperlipidemia who is brought in to the hospital by family for altered mental status, short term memory loss. She is admitted under observation for the following issues.     Global amnesia  DDx Transient global amnesia vs CVA, vs seizure vs due to accelerated htn  *pt's birthday today and was cooking today. Later complained of not feeling well to her . BP elevated to 200 when  checked it. She also reported mild chest discomfort per  which he reports he gave her ASA. That seems to have resolved. No other complaints, patient does not recall events of earlier today and repeatedly asking the same question over and over again in the ED. Although, towards the end of my visit she starting to remember being in ambulance.   *Stroke neuro consulted in ED.   *CT head was negative for acute finding. CTA head was normal. CTA neck showed focal mod-severe stenosis of right vertebral artery. Right ICA 50% stenosis.   *UA negative. CXR without acute finding.   - MRI brain  - EEG  - TSH, Folate and  b12  - neurology consult  - neuro checks q 4 h    Right ICA 50% stenosis  focal mod-severe stenosis of right vertebral artery, similar to prior per CT report  - resume PTA stain, ASA  -defer to neuro for further management    Accelerated htn  */86 on presentation. Has received Hydralazine 10mg in ED. SBP improved to 140s while in ED  - resume PTA meds  - prn hydralazine    CAD  Hx of 3v-CABG-9/2022  Per pt's , patient had mild chest discomfort and that went away after he gave her ASA. The main concern was confusion. EKG shows SR, incomplete RBBB. Currently has no chest pain. Trop x1 is negative.   - statin ,ASA  - tele         Diet:  regular diet  DVT Prophylaxis: Ambulate every  "shift  Gandhi Catheter: Not present  Lines: None     Cardiac Monitoring: None  Code Status:  full code, discussed with     Clinically Significant Risk Factors Present on Admission                # Drug Induced Platelet Defect: home medication list includes an antiplatelet medication   # Hypertension: Noted on problem list      # Overweight: Estimated body mass index is 27.67 kg/m  as calculated from the following:    Height as of this encounter: 1.499 m (4' 11\").    Weight as of this encounter: 62.1 kg (137 lb).       # Asthma: noted on problem list  # History of CABG: noted on surgical history       Disposition Plan           Sim Bosch MD  Hospitalist Service  [facility]  Securely message with [employer] (more info)  Text page via [employer] Paging/Directory     ______________________________________________________________________    Chief Complaint   Altered mental status    History is obtained from the patient, family, chart review, discussion with ED provider    History of Present Illness   Kalani Rowan is a 73 year old female with past medical hx of CAD, s/p 3v-CABG,HTN, hyperlipidemia who is brought in to the hospital by family for altered mental status, memory loss.   is at bedside and gives most of the history.  He reports it is her birthday today and was cooking earlier today. They are planning to have a family get together for dinner with their children and their families. Around 4pm, patient reported to her  that she did not feel well. He checked her blood pressure and systolic reading was 200. She reported mild chest discomfort and  gave her ASA. He also noted she wad confused and could not remember anything. At baseline, she is sharp and with it.  No recent illnesses. Patient denies pain, headache, n/v, chest pain or shortness of breath.     In the ED, initial BP was 190/86. Bmp showed normal sodium, creatinine, calcium levels. Trop is negative. " "UA is negative for acute infection. EKG shows SR, incomplete RBBB  Stroke neuro consulted in ED.   CT head was negative for acute finding. CTA head was normal. CTA neck showed focal mod-severe stenosis of right vertebral artery. Right ICA 50% stenosis.   Neuro recommends MRI and EEG.      During visit, patient does not recall events of today. She  repeatedly asks the same question over and over again. However, towards the end of my visit she is starting to remember about being in the ambulance.        Past Medical History    Past Medical History:   Diagnosis Date    Allergy, unspecified not elsewhere classified        Past Surgical History   Past Surgical History:   Procedure Laterality Date    BYPASS GRAFT ARTERY CORONARY N/A 2022    Procedure: CORONARY ARTERY BYPASS GRAFT x 3 (LEFT INTERNAL MAMMARY ARTERY - LEFT ANTERIOR DESCENDING ARTERY; SAPHENOUS VEIN - OBTUSE MARGINAL ARTERY; SAPHENOUS VEIN - POSTERIOR DESCENDING ARTERY) WITH ENDOSCOPIC SAPHENOUS VEIN HARVEST ON BILATERAL LOWER EXTREMITY, AND ON CARDIOPULMONARY PUMP OXYGENATOR (INTRAOPERATIVE TRANSESOPHAGEAL ECHOCARDIOGRAM BY ANESTHESIOLOGIST);  Surgeon: Berto Tsai    CV CORONARY ANGIOGRAM N/A 2022    Procedure: Coronary Angiogram;  Surgeon: Mao Anguiano MD;  Location: [facility] CARDIAC CATH LAB     LEFT HEART CATH N/A 2022    Procedure: Left Heart Catheterization;  Surgeon: Mao Anguiano MD;  Location:  HEART CARDIAC CATH LAB    [facility] NONSPECIFIC PROCEDURE      S/P Right Tracheotomy Age 6 in [place] \"choking on nuts\"    [facility] NONSPECIFIC PROCEDURE      Left Foot Cyst       Prior to Admission Medications   Prior to Admission Medications   Prescriptions Last Dose Informant Patient Reported? Taking?   ALBUTEROL SULFATE IN   Yes No   Si mcg   Cholecalciferol (VITAMIN D3) 1.25 MG (46524 UT) TABS   Yes No   Si,000 mcg   Coenzyme Q10 (COQ10) 100 MG CAPS   Yes No   Multiple Vitamin (MULTIVITAMIN ADULT PO)   Yes No   Multiple " Vitamins-Minerals (PRESERVISION AREDS) CAPS   Yes No   acetaminophen (TYLENOL) 325 MG tablet   No No   Sig: Take 2 tablets (650 mg) by mouth every 6 hours as needed for other (For optimal non-opioid multimodal pain management to improve pain control.)   albuterol (PROAIR HFA) 108 (90 Base) MCG/ACT inhaler   No No   Sig: Inhale 1-2 puffs into the lungs every 6 hours as needed for shortness of breath   aspirin (ASA) 81 MG chewable tablet   Yes No   Sig: Take 2 tablets (162 mg) by mouth daily   carvedilol (COREG) 6.25 MG tablet   No No   Sig: Take 1 tablet (6.25 mg) by mouth 2 times daily (with meals)   fluticasone (FLONASE) 50 MCG/ACT nasal spray   Yes No   Si mcg   icosapent ethyl (VASCEPA) 1 g CAPS capsule   No No   Sig: Take 2 g by mouth 2 times daily (with meals)   multivitamin w/minerals (THERA-VIT-M) tablet   Yes No   Sig: Take 1 tablet by mouth daily   nitroGLYcerin (NITROSTAT) 0.4 MG sublingual tablet   No No   Sig: For chest pain place 1 tablet under the tongue every 5 minutes for 3 doses. If symptoms persist 5 minutes after 1st dose call 911.   polyethylene glycol (MIRALAX) 17 GM/Dose powder   No No   Sig: Take 17 g by mouth daily as needed for constipation   pravastatin (PRAVACHOL) 40 MG tablet   No No   Sig: Take 1 tablet (40 mg) by mouth daily   senna-docusate (SENOKOT-S/PERICOLACE) 8.6-50 MG tablet   No No   Sig: Take 1 tablet by mouth 2 times daily      Facility-Administered Medications: None        Review of Systems    The 10 point Review of Systems is negative other than noted in the HPI    Social History   I have reviewed this patient's social history and updated it with pertinent information if needed.  Social History     Tobacco Use    Smoking status: Never     Passive exposure: Never    Smokeless tobacco: Never   Vaping Use    Vaping Use: Never used   Substance Use Topics    Alcohol use: No     Alcohol/week: 0.0 standard drinks of alcohol    Drug use: Never        Physical Exam   Vital Signs:  Temp: 98.2  F (36.8  C) Temp src: Temporal BP: (!) 166/89 Pulse: 66   Resp: 16 SpO2: 97 % O2 Device: None (Room air)    Weight: 137 lbs 0 oz    General Appearance: alert, awake and no apparent distress  HEENT: NCAT, oral mucosa is moist, no pharyngeal erythema, exudates  Respiratory: CTAB, no wheezing  Cardiovascular: regular rate and rhythm  GI: soft and non-tender  Skin: warm and dry  Musculoskeletal: normal muscle tone  Neurologic: alert, awake, asking if she is at [facility] or [place] after being told multiple times where she is, No facial asymmetry, strength is symmetrical bilateral UE  Psychiatric: mood and affect are normal    Medical Decision Making       55 MINUTES SPENT BY ME on the date of service doing chart review, history, exam, documentation & further activities per the note.  MANAGEMENT DISCUSSED with the following over the past 24 hours: patient, RN   NOTE(S)/MEDICAL RECORDS REVIEWED over the past 24 hours: neurology note, cardiology clinic note  Tests ORDERED & REVIEWED in the past 24 hours:  - BMP  - CBC  - Troponin  Tests personally interpreted in the past 24 hours:  - CT head, CTA head and neck  showing as above       Data     I have personally reviewed the following data over the past 24 hrs:    8.6  \   15.3   / 290     140 102 17.0 /  106 (H)   3.5 22 0.72 \     Trop: <6 BNP: N/A       Imaging results reviewed over the past 24 hrs:   Recent Results (from the past 24 hour(s))   Chest XR,  PA & LAT    Narrative    EXAM: XR CHEST 2 VIEWS  LOCATION: [facility]  DATE: 9/27/2023    INDICATION: AMS  COMPARISON: 4/7/2023      Impression    IMPRESSION: Sternotomy. Heart normal in size. Lungs are clear.   Head CT w/o contrast    Narrative    EXAM: CT HEAD W/O CONTRAST, CTA HEAD NECK W CONTRAST  LOCATION: [facility]  DATE: 9/27/2023    INDICATION: AMS  COMPARISON: 11/02/2019  CONTRAST: 67mL Isovue 370  TECHNIQUE: Head and neck CT angiogram  with IV contrast. Noncontrast head CT followed by axial helical CT images of the head and neck vessels obtained during the arterial phase of intravenous contrast administration. Axial 2D reconstructed images and   multiplanar 3D MIP reconstructed images of the head and neck vessels were performed by the technologist. Dose reduction techniques were used. All stenosis measurements made according to NASCET criteria unless otherwise specified.    FINDINGS:   NONCONTRAST HEAD CT:   INTRACRANIAL CONTENTS: No intracranial hemorrhage, extraaxial collection, or mass effect.  No CT evidence of acute infarct. Mild presumed chronic small vessel ischemic changes. Mild generalized volume loss. No hydrocephalus.     VISUALIZED ORBITS/SINUSES/MASTOIDS: No intraorbital abnormality. No paranasal sinus mucosal disease. No middle ear or mastoid effusion.    BONES/SOFT TISSUES: No acute abnormality.    HEAD CTA:  ANTERIOR CIRCULATION: No stenosis/occlusion, aneurysm, or high flow vascular malformation. Standard [place] anatomy.    POSTERIOR CIRCULATION: No stenosis/occlusion, aneurysm, or high flow vascular malformation. Balanced vertebral arteries supply a normal basilar artery.     DURAL VENOUS SINUSES: Expected enhancement of the major dural venous sinuses.    NECK CTA:  RIGHT CAROTID: Atherosclerotic plaque results in 50% stenosis in the right ICA. No dissection.    LEFT CAROTID: No measurable stenosis or dissection. Nonstenotic atherosclerotic calcification at the bifurcation.    VERTEBRAL ARTERIES: There is focal moderate to severe stenosis of the right vertebral artery origin is similar prior. This may be secondary to mixed foraminal and noncalcified plaque, small ulcerated plaque, or focal partially thrombosed blister type   aneurysm. Balanced vertebral arteries.    AORTIC ARCH: Classic aortic arch anatomy with no significant stenosis at the origin of the great vessels.    NONVASCULAR STRUCTURES: Unremarkable.       Impression    IMPRESSION:   HEAD CT:  1.  No CT evidence for acute intracranial process.  2.  Brain atrophy and presumed chronic microvascular ischemic changes as above.    HEAD CTA:   1.  Normal CTA [place].    NECK CTA:  1.  Focal moderate to severe stenosis of the right vertebral artery as described above. There is grossly similar prior.  2.  50% stenosis of the right cervical internal carotid artery origin.   CTA Head Neck with Contrast    Narrative    EXAM: CT HEAD W/O CONTRAST, CTA HEAD NECK W CONTRAST  LOCATION: [facility]  DATE: 9/27/2023    INDICATION: AMS  COMPARISON: 11/02/2019  CONTRAST: 67mL Isovue 370  TECHNIQUE: Head and neck CT angiogram with IV contrast. Noncontrast head CT followed by axial helical CT images of the head and neck vessels obtained during the arterial phase of intravenous contrast administration. Axial 2D reconstructed images and   multiplanar 3D MIP reconstructed images of the head and neck vessels were performed by the technologist. Dose reduction techniques were used. All stenosis measurements made according to NASCET criteria unless otherwise specified.    FINDINGS:   NONCONTRAST HEAD CT:   INTRACRANIAL CONTENTS: No intracranial hemorrhage, extraaxial collection, or mass effect.  No CT evidence of acute infarct. Mild presumed chronic small vessel ischemic changes. Mild generalized volume loss. No hydrocephalus.     VISUALIZED ORBITS/SINUSES/MASTOIDS: No intraorbital abnormality. No paranasal sinus mucosal disease. No middle ear or mastoid effusion.    BONES/SOFT TISSUES: No acute abnormality.    HEAD CTA:  ANTERIOR CIRCULATION: No stenosis/occlusion, aneurysm, or high flow vascular malformation. Standard [place] anatomy.    POSTERIOR CIRCULATION: No stenosis/occlusion, aneurysm, or high flow vascular malformation. Balanced vertebral arteries supply a normal basilar artery.     DURAL VENOUS SINUSES: Expected enhancement of the major dural  venous sinuses.    NECK CTA:  RIGHT CAROTID: Atherosclerotic plaque results in 50% stenosis in the right ICA. No dissection.    LEFT CAROTID: No measurable stenosis or dissection. Nonstenotic atherosclerotic calcification at the bifurcation.    VERTEBRAL ARTERIES: There is focal moderate to severe stenosis of the right vertebral artery origin is similar prior. This may be secondary to mixed foraminal and noncalcified plaque, small ulcerated plaque, or focal partially thrombosed blister type   aneurysm. Balanced vertebral arteries.    AORTIC ARCH: Classic aortic arch anatomy with no significant stenosis at the origin of the great vessels.    NONVASCULAR STRUCTURES: Unremarkable.      Impression    IMPRESSION:   HEAD CT:  1.  No CT evidence for acute intracranial process.  2.  Brain atrophy and presumed chronic microvascular ischemic changes as above.    HEAD CTA:   1.  Normal CTA [place].    NECK CTA:  1.  Focal moderate to severe stenosis of the right vertebral artery as described above. There is grossly similar prior.  2.  50% stenosis of the right cervical internal carotid artery origin.   MR Brain w/o Contrast    Narrative    EXAM: MR BRAIN W/O CONTRAST  LOCATION: [facility]  DATE: 9/27/2023    INDICATION: AMS  COMPARISON: CT/CTA 09/27/2023  TECHNIQUE: Routine multiplanar multisequence head MRI without intravenous contrast.    FINDINGS:  INTRACRANIAL CONTENTS: No acute or subacute infarct. No mass, acute hemorrhage, or extra-axial fluid collections. Normal brain parenchymal signal. Mild generalized cerebral atrophy. No hydrocephalus. Normal position of the cerebellar tonsils.     SELLA: No abnormality accounting for technique.    OSSEOUS STRUCTURES/SOFT TISSUES: Normal marrow signal. The major intracranial vascular flow voids are maintained.     ORBITS: No abnormality accounting for technique.     SINUSES/MASTOIDS: No paranasal sinus mucosal disease. No middle ear or mastoid  effusion.       Impression    IMPRESSION:  1.  No recent infarct, intracranial mass or obvious intracranial hemorrhage.  2.  Mild generalized volume loss.

## 2023-09-28 NOTE — PROGRESS NOTES
Spouse here; updated with labs and results so far; he would like to speak with Neurologist.  Text paged Dr. Donnelly through [facility].

## 2023-09-28 NOTE — PROGRESS NOTES
RECEIVING UNIT ED HANDOFF REVIEW    ED Nurse Handoff Report was reviewed by: Robles Clemente RN on September 28, 2023 at 1:16 AM

## 2023-09-28 NOTE — CONSULTS
[facility]    Stroke Consult Note    Reason for Consult:  Episode of confusion and no recall    Chief Complaint: Chest Pain       HPI  Kalani Rowan is a 73 year old female CAD s/p CABG an year ago, hypothyroidism, HTN, HLD, CKD,TIA in 2019, asthma was feeling well until yesterday (9/27) at 4 PM when she called her  mentioning that she started feeling unwell and confuse. During the  evaluation he noticed that she was having difficult remembering things and elevated BP (200/90 per spouse), but she was understanding commands, fluent and normal speech, no visual symptoms, no focal numbness/weakness. She did complaint about overall weakness and nausea, but no vomiting, dizziness or hearing loss. EMS evaluated the patient and decided together with patient and spouse to take the patient to the ED.   She was admitted still having trouble remembering recent events, for example as her son and daughter visiting her at the Hospital. CTH showed no acute changing, CTA H&N with contrast demonstrated a focal moderate to severe stenosis of the right vertebral artery and 50% stenosis of the R cervical ICA. MRI showed no changes.    She denies history of smoking, alcohol use, drug use. Reports mother had a stroke in her 70s. Mother also had CAD and CABG done.      Stroke Evaluation Summarized    MRI 1.  No recent infarct, intracranial mass or obvious intracranial hemorrhage.  2.  Mild generalized volume loss.   Intracranial Vasculature HEAD CTA:   1.  Normal CTA [place] [facility] [place]   Cervical Vasculature NECK CTA:  1.  Focal moderate to severe stenosis of the right vertebral artery as described above. There is grossly similar prior.  2.  50% stenosis of the right cervical internal carotid artery origin.     Echocardiogram Pending   EKG/Telemetry Sinus rhythm   Other Testing Not Applicable      LDL  No lab value available in past 30 days   A1C  No lab value available in past 90 days  "  Troponin 9/27/2023: <6 ng/L       Impression  Probable Transient Global Amnesia    Recommendations   - EEG - pending report  - MRI no evidence of CVA  - No specific intervention for vertebral stenosis for now  - Daily aspirin 162 mg for secondary stroke prevention  - Statin: Home Statin  - 24-hour Telemetry  - Bedside Glucose Monitoring  - A1c, Lipid Panel  - Euthermia, Euglycemia  - Monitor for epileptiform events today  - Spouse and son updated at bedside, questions and concerns were addressed    Patient Follow-up    - final recommendation pending work-up    Thank you for this consult. We will continue to follow.     This case was discussed with Stroke Staff Dr. Sergio Benson.    Jaret Addison MD on 9/28/2023 at 5:19 PM    _____________________________________________________    Clinically Significant Risk Factors Present on Admission                # Drug Induced Platelet Defect: home medication list includes an antiplatelet medication   # Hypertension: Noted on problem list      # Overweight: Estimated body mass index is 26.6 kg/m  as calculated from the following:    Height as of this encounter: 1.499 m (4' 11\").    Weight as of this encounter: 59.7 kg (131 lb 11.2 oz).       # Asthma: noted on problem list  # History of CABG: noted on surgical history          Past Medical History    Past Medical History:   Diagnosis Date    Allergy, unspecified not elsewhere classified      Medications   Home Meds  Prior to Admission medications    Medication Sig Start Date End Date Taking? Authorizing Provider   acetaminophen (TYLENOL) 325 MG tablet Take 2 tablets (650 mg) by mouth every 6 hours as needed for other (For optimal non-opioid multimodal pain management to improve pain control.) 9/9/22  Yes Sophie Stanley PA-C   albuterol (PROAIR HFA) 108 (90 Base) MCG/ACT inhaler Inhale 1-2 puffs into the lungs every 6 hours as needed for shortness of breath 6/27/23  Yes Lucia Lind MD "   aspirin (ASA) 81 MG chewable tablet Take 162 mg by mouth every evening 12/28/22  Yes Lucia Lind MD   carvedilol (COREG) 6.25 MG tablet Take 1 tablet (6.25 mg) by mouth 2 times daily (with meals) 6/14/23  Yes Ever Villalobos MD   Cholecalciferol (VITAMIN D3) 1.25 MG (24066 UT) TABS Take 5,000 mcg by mouth daily 7/13/22  Yes Reported, Patient   Coenzyme Q10 (COQ10) 100 MG CAPS Take 100 mg by mouth daily   Yes Reported, Patient   fluticasone (FLONASE) 50 MCG/ACT nasal spray Spray 50 mcg in nostril as needed for allergies   Yes Reported, Patient   icosapent ethyl (VASCEPA) 1 g CAPS capsule Take 2 g by mouth 2 times daily (with meals) 7/18/23  Yes Ever Villalobos MD   Multiple Vitamin (MULTIVITAMIN ADULT PO) Take 1 tablet by mouth daily   Yes Reported, Patient   Multiple Vitamins-Minerals (PRESERVISION AREDS) CAPS Take 1 capsule by mouth daily 12/28/22  Yes Lucia Lind MD   multivitamin w/minerals (THERA-VIT-M) tablet Take 1 tablet by mouth daily   Yes Reported, Patient   nitroGLYcerin (NITROSTAT) 0.4 MG sublingual tablet For chest pain place 1 tablet under the tongue every 5 minutes for 3 doses. If symptoms persist 5 minutes after 1st dose call 911. 12/13/22  Yes Ever Villalobos MD   polyethylene glycol (MIRALAX) 17 GM/Dose powder Take 17 g by mouth daily as needed for constipation 9/9/22  Yes Sophie Stanley PA-C   pravastatin (PRAVACHOL) 40 MG tablet Take 1 tablet (40 mg) by mouth daily 7/18/23  Yes Ever Villalobos MD       Scheduled Meds   aspirin  162 mg Oral Daily    carvedilol  6.25 mg Oral BID w/meals    pravastatin  40 mg Oral Daily    senna-docusate  1 tablet Oral BID    Or    senna-docusate  2 tablet Oral BID       Infusion Meds      Allergies   Allergies   Allergen Reactions    Elderberry [Sambucus Canadensis] Anaphylaxis     Elderberry syrup- ER.     Codeine     [place] Nite Time Itching    Other Environmental Allergy      Pine sol causes anylphylaxis    Atorvastatin  Rash          PHYSICAL EXAMINATION   Temp:  [97.9  F (36.6  C)-98.2  F (36.8  C)] 98  F (36.7  C)  Pulse:  [64-82] 77  Resp:  [13-19] 18  BP: (123-190)/(70-97) 129/70  SpO2:  [95 %-99 %] 97 %    General Exam  General:  patient lying in bed without any acute distress    HEENT:  normocephalic/atraumatic  Cardio:  RRR  Pulmonary:  no respiratory distress  Abdomen:  soft  Extremities:  no edema  Skin:  intact     Neuro Exam    Mental status: Awake, alert, attentive; oriented to P/P/T/M, unable to follow 2 step sequential commands, can answer serial 7s, days of the week backwards, tell 7 quarters in 1.75$.    Speech: Fluent, comprehension and repetition intact; No dysarthria    Cranial nerves: Visual fields intact, Eyes conjugate, PERRLA, EOMI w/ normal   and smooth pursuit, face expression symmetric, facial sensation intact and   symmetric, hearing intact to conversation, shoulder shrug strong, palate rise   symmetric, tongue/uvula midline.    Motor: Tone normal. 5/5 strength in x4E. No atrophy or twitches. Pronator   drift absent    Reflexes: 2+ and symmetric biceps, brachioradialis, patellar, and achilles. No   crossed adductors or spread. Toes down-going.    Sensory: Intact to LT,  No lateral extinction     Coordination: FNF intact bilaterally, no dysmetria; Normal heel-shin test   bilaterally    Gait: Deferred      Stroke Scales    NIHSS  1a. Level of Consciousness 0-->Alert, keenly responsive   1b. LOC Questions 0-->Answers both questions correctly   1c. LOC Commands 0-->Performs both tasks correctly   2.   Best Gaze 0-->Normal   3.   Visual 0-->No visual loss   4.   Facial Palsy 0-->Normal symmetrical movements   5a. Motor Arm, Left 0-->No drift, limb holds 90 (or 45) degrees for full 10 secs   5b. Motor Arm, Right 0-->No drift, limb holds 90 (or 45) degrees for full 10 secs   6a. Motor Leg, Left 0-->No drift, leg holds 30 degree position for full 5 secs   6b. Motor Leg, right 0-->No drift, leg holds 30 degree  position for full 5 secs   7.   Limb Ataxia 0-->Absent   8.   Sensory 0-->Normal, no sensory loss   9.   Best Language 0-->No aphasia, normal   10. Dysarthria 0-->Normal   11. Extinction and Inattention  0-->No abnormality   Total 0 (09/28/23 1144)       Imaging  I personally reviewed all imaging; relevant findings per HPI.    Labs Data   CBC  Recent Labs   Lab 09/27/23  1651   WBC 8.6   RBC 5.07   HGB 15.3   HCT 44.7        Basic Metabolic Panel   Recent Labs   Lab 09/27/23  1651      POTASSIUM 3.5   CHLORIDE 102   CO2 22   BUN 17.0   CR 0.72   *   APRIL 10.0     Liver Panel  No results for input(s): PROTTOTAL, ALBUMIN, BILITOTAL, ALKPHOS, AST, ALT, BILIDIRECT in the last 168 hours.  INR    Recent Labs   Lab Test 09/02/22  1429 09/02/22  1226 11/02/19  1406   INR 1.26* 1.55* 0.88           Stroke Consult Data Data   This was a non-emergent, non-telestroke consult.

## 2023-09-28 NOTE — PLAN OF CARE
"Goal Outcome Evaluation:      Plan of Care Reviewed With: patient, spouse    Overall Patient Progress: improvingOverall Patient Progress: improving    Summary:  Altered mental status  DATE & TIME: 9/28/23 8460-2482   Cognitive Concerns/ Orientation : A&O x4 with some forgetfulness to day this morning.  States she is \"more alert\" this afternoon.  She does not recall all the events of yesterday.  BEHAVIOR & AGGRESSION TOOL COLOR: Green  CIWA SCORE: NA   ABNL VS/O2: VSS on RA  MOBILITY: up independent in room.  PAIN MANAGMENT: Denied  DIET: Regular; good intake  BOWEL/BLADDER: Continent of B/B  ABNL LAB/BG: TSH 4.67   DRAIN/DEVICES: PIV SL  TELEMETRY RHYTHM: SB-NSR  SKIN: WDL  TESTS/PROCEDURES: EEG and cardiac echo completed at bedside. Results pending  D/C DAY/GOALS/PLACE: Pending full workup and plan forward 1-2 days  OTHER IMPORTANT INFO: spouse (a MD) here and supportive      "

## 2023-09-28 NOTE — DISCHARGE INSTRUCTIONS
Your risk factors for stroke or TIA (transient ischemic attack):    Your Risk Factors Your Results Normal Ranges   High blood pressure BP Readings from Last 1 Encounters:   09/28/23 129/70    Less than 120/80   Cholesterol              Total Lab Results   Component Value Date    CHOL 324 06/27/2023    CHOL 377 11/02/2019      Less than 150    Triglycerides   Lab Results   Component Value Date    TRIG 303 06/27/2023    TRIG 547 11/02/2019    Less than 150   LDL Lab Results   Component Value Date     06/27/2023    LDL  11/02/2019     Cannot estimate LDL when triglyceride exceeds 400 mg/dL     11/02/2019       Less than 70   HDL Lab Results   Component Value Date    HDL 38 06/27/2023    HDL 54 11/02/2019            Greater than 40 (men)  Greater than 50 (women)   Diabetes Recent Labs   Lab 09/28/23  0937   *    Fasting blood glucose    Smoking/tobacco use  Quit smoking and tobacco   Overweight  Lose 1-2 pounds a week   Lack of exercise  30 minutes moderate activity each day   Other risk factors include carotid (neck) artery disease, atrial fibrillation and stress. You may be on new medicine to treat high blood pressure, cholesterol, diabetes or atrial fibrillation.    Understanding Stroke Booklet given to patient. Please refer to booklet for further information.    Stroke warning signs and symptoms - CALL 911 right away for:  - Sudden numbness or weakness in the face, arm or leg (often on one side of the body).  - Sudden confusion or trouble understanding what is going on.  - Sudden blurred or decreased vision in one or both eyes.  - Sudden trouble speaking, loss of balance, dizziness or problems with coordination.  - Sudden, severe headache for no reason.  - Fainting or seizures.  - Symptoms may go away then come back suddenly.

## 2023-09-28 NOTE — ED NOTES
"[facility]  ED Nurse Handoff Report    ED Chief complaint: Chest Pain      ED Diagnosis:   Final diagnoses:   Encephalopathy       Code Status: Full Code    Allergies:   Allergies   Allergen Reactions    Elderberry [Sambucus Canadensis] Anaphylaxis     Elderberry syrup- ER.     Codeine     [place] Nite Time Itching    Other Environmental Allergy      Pine sol causes anylphylaxis    Atorvastatin Rash       Patient Story: Pt BIBA with sudden onset CP and confusion. Pt has hx cardiac bypass and took 1 nitroglycerin and 324 ASA PTA. CP 0/10 upon arrival.   Focused Assessment:  No SOB, no syncope, no n/v/d. Pt alert, but disoriented to situation. Pt keeps repeating \"I don't know why I'm here, I lost consciousness\".     Treatments and/or interventions provided: PIV; CT; MRI; labs; hydralazine  Labs Ordered and Resulted from Time of ED Arrival to Time of ED Departure   BASIC METABOLIC PANEL - Abnormal       Result Value    Sodium 140      Potassium 3.5      Chloride 102      Carbon Dioxide (CO2) 22      Anion Gap 16 (*)     Urea Nitrogen 17.0      Creatinine 0.72      GFR Estimate 88      Calcium 10.0      Glucose 106 (*)    ROUTINE UA WITH MICROSCOPIC REFLEX TO CULTURE - Abnormal    Color Urine Light Yellow      Appearance Urine Clear      Glucose Urine Negative      Bilirubin Urine Negative      Ketones Urine Negative      Specific Gravity Urine 1.020      Blood Urine Negative      pH Urine 7.0      Protein Albumin Urine Negative      Urobilinogen Urine Normal      Nitrite Urine Negative      Leukocyte Esterase Urine Negative      Mucus Urine Present (*)     RBC Urine <1      WBC Urine <1     TROPONIN T, HIGH SENSITIVITY - Normal    Troponin T, High Sensitivity <6     CBC WITH PLATELETS AND DIFFERENTIAL    WBC Count 8.6      RBC Count 5.07      Hemoglobin 15.3      Hematocrit 44.7      MCV 88      MCH 30.2      MCHC 34.2      RDW 12.8      Platelet Count 290      % Neutrophils 72      % Lymphocytes 22      " % Monocytes 5      % Eosinophils 1      % Basophils 0      % Immature Granulocytes 0      NRBCs per 100 WBC 0      Absolute Neutrophils 6.2      Absolute Lymphocytes 1.9      Absolute Monocytes 0.4      Absolute Eosinophils 0.1      Absolute Basophils 0.0      Absolute Immature Granulocytes 0.0      Absolute NRBCs 0.0        Patient's response to treatments and/or interventions: Tolerated    To be done/followed up on inpatient unit:  Monitor    Does this patient have any cognitive concerns?: Short term memory loss and Disoriented to situation    Activity level - Baseline/Home:  Independent  Activity Level - Current:   Independent    Patient's Preferred language: English   Needed?: No    Isolation: None  Infection: Not Applicable  Patient tested for COVID 19 prior to admission: NO  Bariatric?: No    Vital Signs:   Vitals:    09/27/23 2015 09/27/23 2030 09/27/23 2100 09/27/23 2130   BP: (!) 152/78 (!) 166/89 (!) 149/72 (!) 141/81   Pulse: 69 66 66 71   Resp: 14 16 15 19   Temp:       TempSrc:       SpO2: 96% 97% 95% 95%   Weight:       Height:           Cardiac Rhythm:     Was the PSS-3 completed:   Yes  What interventions are required if any?               Family Comments: N/A  OBS brochure/video discussed/provided to patient/family: N/A              Name of person given brochure if not patient:               Relationship to patient:     For the majority of the shift this patient's behavior was Green.   Behavioral interventions performed were .    ED NURSE PHONE NUMBER: [phone]

## 2023-09-28 NOTE — PROGRESS NOTES
[facility]    Medicine Progress Note - Hospitalist Service    Date of Admission:  9/27/2023  Date of Service: 09/28/2023    Assessment & Plan      Kalani Rowan is a 73 year old female with past medical hx of CAD, s/p 3v-CABG,HTN, hyperlipidemia who is brought in to the hospital by family for altered mental status, short term memory loss. She is admitted under observation for the following issues.     Global amnesia  DDx Transient global amnesia vs CVA, vs seizure vs due to accelerated htn  *pt's birthday today and was cooking today. Later complained of not feeling well to her . BP elevated to 200 when  checked it. She also reported mild chest discomfort per  which he reports he gave her ASA. That seems to have resolved. No other complaints, patient does not recall events of earlier today and repeatedly asking the same question over and over again in the ED. Although, towards the end of my visit she starting to remember being in ambulance.   *Stroke neuro consulted in ED.   *CT head was negative for acute finding. CTA head was normal. CTA neck showed focal mod-severe stenosis of right vertebral artery. Right ICA 50% stenosis.   *UA negative. CXR without acute finding.   Plan:  - MRI brain -> No recent infarct, intracranial mass or obvious intracranial hemorrhage.  Mild generalized volume loss.  - EEG pending   - TSH wnl  - Folate and  b12 pending  - neurology consulted  - neuro checks q 4 h  - ECHO pending    Right ICA 50% stenosis  focal mod-severe stenosis of right vertebral artery, similar to prior per CT report  - resume PTA stain, ASA  -defer to neuro for further management    Accelerated htn  */86 on presentation. Has received Hydralazine 10mg in ED. SBP improved to 140s while in ED  - resume PTA meds  - prn hydralazine    CAD  Hx of 3v-CABG-9/2022  Per pt's , patient had mild chest discomfort and that went away after he gave her ASA. The main  "concern was confusion. EKG shows SR, incomplete RBBB. Currently has no chest pain. Trop x1 is negative.   - statin ,ASA  - Monitor on telemetry         Diet: Regular Diet Adult    DVT Prophylaxis: Pneumatic Compression Devices  Gandhi Catheter: Not present  Lines: None     Cardiac Monitoring: ACTIVE order. Indication: altered mental status  Code Status: Full Code      Clinically Significant Risk Factors Present on Admission                # Drug Induced Platelet Defect: home medication list includes an antiplatelet medication   # Hypertension: Noted on problem list      # Overweight: Estimated body mass index is 26.6 kg/m  as calculated from the following:    Height as of this encounter: 1.499 m (4' 11\").    Weight as of this encounter: 59.7 kg (131 lb 11.2 oz).       # Asthma: noted on problem list  # History of CABG: noted on surgical history       Disposition Plan      Expected Discharge Date: 09/29/2023                  Leonel Harris MD  Hospitalist Service  [facility]  Securely message with DirectRM (more info)  Text page via [employer] Paging/Directory   ______________________________________________________________________    Interval History     No further episodes since admission  No CP/SOB  No fevers  No nausea / vomiting  No headaches  No new complaints    Physical Exam   Vital Signs: Temp: 97.8  F (36.6  C) Temp src: Oral BP: 116/63 Pulse: 61   Resp: 18 SpO2: 96 % O2 Device: None (Room air)    Weight: 131 lbs 11.2 oz    Constitutional: awake, alert, cooperative, no apparent distress.   Respiratory: CTABL   Cardiovascular: RRR with no m/r/g   GI: Normal bowel sounds, soft, non-distended, non-tender.   Neurologic: Awake, alert, oriented to name, place and time. Maria [place].   Neuropsychiatric: normal mood and affect  ----------------------------------------------------------------------------------------    Medical Decision Making       35 MINUTES SPENT BY ME on the date of service doing chart " review, history, exam, documentation & further activities per the note.      Data   ------------------------- PAST 24 HR DATA REVIEWED -----------------------------------------------    I have personally reviewed the following data over the past 24 hrs:    8.6  \   15.3   / 290     141 105 16.2 /  111 (H)   3.7 20 (L) 0.74 \     ALT: 26 AST: 22 AP: 122 (H) TBILI: 0.4   ALB: 4.4 TOT PROTEIN: 7.1 LIPASE: N/A     Trop: <6 BNP: N/A     TSH: 4.67 (H) T4: 1.16 A1C: 5.8 (H)       Imaging results reviewed over the past 24 hrs:   Recent Results (from the past 24 hour(s))   Chest XR,  PA & LAT    Narrative    EXAM: XR CHEST 2 VIEWS  LOCATION: [facility]  DATE: 9/27/2023    INDICATION: AMS  COMPARISON: 4/7/2023      Impression    IMPRESSION: Sternotomy. Heart normal in size. Lungs are clear.   Head CT w/o contrast    Narrative    EXAM: CT HEAD W/O CONTRAST, CTA HEAD NECK W CONTRAST  LOCATION: [facility]  DATE: 9/27/2023    INDICATION: AMS  COMPARISON: 11/02/2019  CONTRAST: 67mL Isovue 370  TECHNIQUE: Head and neck CT angiogram with IV contrast. Noncontrast head CT followed by axial helical CT images of the head and neck vessels obtained during the arterial phase of intravenous contrast administration. Axial 2D reconstructed images and   multiplanar 3D MIP reconstructed images of the head and neck vessels were performed by the technologist. Dose reduction techniques were used. All stenosis measurements made according to NASCET criteria unless otherwise specified.    FINDINGS:   NONCONTRAST HEAD CT:   INTRACRANIAL CONTENTS: No intracranial hemorrhage, extraaxial collection, or mass effect.  No CT evidence of acute infarct. Mild presumed chronic small vessel ischemic changes. Mild generalized volume loss. No hydrocephalus.     VISUALIZED ORBITS/SINUSES/MASTOIDS: No intraorbital abnormality. No paranasal sinus mucosal disease. No middle ear or mastoid effusion.    BONES/SOFT  TISSUES: No acute abnormality.    HEAD CTA:  ANTERIOR CIRCULATION: No stenosis/occlusion, aneurysm, or high flow vascular malformation. Standard [place] anatomy.    POSTERIOR CIRCULATION: No stenosis/occlusion, aneurysm, or high flow vascular malformation. Balanced vertebral arteries supply a normal basilar artery.     DURAL VENOUS SINUSES: Expected enhancement of the major dural venous sinuses.    NECK CTA:  RIGHT CAROTID: Atherosclerotic plaque results in 50% stenosis in the right ICA. No dissection.    LEFT CAROTID: No measurable stenosis or dissection. Nonstenotic atherosclerotic calcification at the bifurcation.    VERTEBRAL ARTERIES: There is focal moderate to severe stenosis of the right vertebral artery origin is similar prior. This may be secondary to mixed foraminal and noncalcified plaque, small ulcerated plaque, or focal partially thrombosed blister type   aneurysm. Balanced vertebral arteries.    AORTIC ARCH: Classic aortic arch anatomy with no significant stenosis at the origin of the great vessels.    NONVASCULAR STRUCTURES: Unremarkable.      Impression    IMPRESSION:   HEAD CT:  1.  No CT evidence for acute intracranial process.  2.  Brain atrophy and presumed chronic microvascular ischemic changes as above.    HEAD CTA:   1.  Normal CTA [place].    NECK CTA:  1.  Focal moderate to severe stenosis of the right vertebral artery as described above. There is grossly similar prior.  2.  50% stenosis of the right cervical internal carotid artery origin.   CTA Head Neck with Contrast    Narrative    EXAM: CT HEAD W/O CONTRAST, CTA HEAD NECK W CONTRAST  LOCATION: [facility]  DATE: 9/27/2023    INDICATION: AMS  COMPARISON: 11/02/2019  CONTRAST: 67mL Isovue 370  TECHNIQUE: Head and neck CT angiogram with IV contrast. Noncontrast head CT followed by axial helical CT images of the head and neck vessels obtained during the arterial phase of intravenous contrast  administration. Axial 2D reconstructed images and   multiplanar 3D MIP reconstructed images of the head and neck vessels were performed by the technologist. Dose reduction techniques were used. All stenosis measurements made according to NASCET criteria unless otherwise specified.    FINDINGS:   NONCONTRAST HEAD CT:   INTRACRANIAL CONTENTS: No intracranial hemorrhage, extraaxial collection, or mass effect.  No CT evidence of acute infarct. Mild presumed chronic small vessel ischemic changes. Mild generalized volume loss. No hydrocephalus.     VISUALIZED ORBITS/SINUSES/MASTOIDS: No intraorbital abnormality. No paranasal sinus mucosal disease. No middle ear or mastoid effusion.    BONES/SOFT TISSUES: No acute abnormality.    HEAD CTA:  ANTERIOR CIRCULATION: No stenosis/occlusion, aneurysm, or high flow vascular malformation. Standard [place] anatomy.    POSTERIOR CIRCULATION: No stenosis/occlusion, aneurysm, or high flow vascular malformation. Balanced vertebral arteries supply a normal basilar artery.     DURAL VENOUS SINUSES: Expected enhancement of the major dural venous sinuses.    NECK CTA:  RIGHT CAROTID: Atherosclerotic plaque results in 50% stenosis in the right ICA. No dissection.    LEFT CAROTID: No measurable stenosis or dissection. Nonstenotic atherosclerotic calcification at the bifurcation.    VERTEBRAL ARTERIES: There is focal moderate to severe stenosis of the right vertebral artery origin is similar prior. This may be secondary to mixed foraminal and noncalcified plaque, small ulcerated plaque, or focal partially thrombosed blister type   aneurysm. Balanced vertebral arteries.    AORTIC ARCH: Classic aortic arch anatomy with no significant stenosis at the origin of the great vessels.    NONVASCULAR STRUCTURES: Unremarkable.      Impression    IMPRESSION:   HEAD CT:  1.  No CT evidence for acute intracranial process.  2.  Brain atrophy and presumed chronic microvascular ischemic changes as  above.    HEAD CTA:   1.  Normal CTA [place].    NECK CTA:  1.  Focal moderate to severe stenosis of the right vertebral artery as described above. There is grossly similar prior.  2.  50% stenosis of the right cervical internal carotid artery origin.   MR Brain w/o Contrast    Narrative    EXAM: MR BRAIN W/O CONTRAST  LOCATION: [facility]  DATE: 9/27/2023    INDICATION: AMS  COMPARISON: CT/CTA 09/27/2023  TECHNIQUE: Routine multiplanar multisequence head MRI without intravenous contrast.    FINDINGS:  INTRACRANIAL CONTENTS: No acute or subacute infarct. No mass, acute hemorrhage, or extra-axial fluid collections. Normal brain parenchymal signal. Mild generalized cerebral atrophy. No hydrocephalus. Normal position of the cerebellar tonsils.     SELLA: No abnormality accounting for technique.    OSSEOUS STRUCTURES/SOFT TISSUES: Normal marrow signal. The major intracranial vascular flow voids are maintained.     ORBITS: No abnormality accounting for technique.     SINUSES/MASTOIDS: No paranasal sinus mucosal disease. No middle ear or mastoid effusion.       Impression    IMPRESSION:  1.  No recent infarct, intracranial mass or obvious intracranial hemorrhage.  2.  Mild generalized volume loss.     ------------------------- ENCOUNTER LABS ----------------------------------------------------------------  Recent Labs   Lab 09/28/23  0937 09/27/23  1651   WBC  --  8.6   HGB  --  15.3   MCV  --  88   PLT  --  290    140   POTASSIUM 3.7 3.5   CHLORIDE 105 102   CO2 20* 22   BUN 16.2 17.0   CR 0.74 0.72   ANIONGAP 16* 16*   APRIL 10.0 10.0   * 106*   ALBUMIN 4.4  --    PROTTOTAL 7.1  --    BILITOTAL 0.4  --    ALKPHOS 122*  --    ALT 26  --    AST 22  --        Most Recent 3 CBC's:  Recent Labs   Lab Test 09/27/23  1651 05/31/23  1502 12/13/22  1100   WBC 8.6 6.2 6.0   HGB 15.3 15.4 14.5   MCV 88 90 87    293 281     Most Recent 3 BMP's:  Recent Labs   Lab Test  09/28/23  0937 09/27/23  1651 06/27/23  1103    140 140   POTASSIUM 3.7 3.5 4.5   CHLORIDE 105 102 106   CO2 20* 22 23   BUN 16.2 17.0 19.8   CR 0.74 0.72 0.67   ANIONGAP 16* 16* 11   APRIL 10.0 10.0 9.5   * 106* 97     Most Recent 2 LFT's:  Recent Labs   Lab Test 09/28/23  0937 06/27/23  1103   AST 22 23   ALT 26 25   ALKPHOS 122* 114*   BILITOTAL 0.4 0.4     Most Recent 3 INR's:  Recent Labs   Lab Test 09/02/22  1429 09/02/22  1226 11/02/19  1406   INR 1.26* 1.55* 0.88     Most Recent 3 Troponin's:  Recent Labs   Lab Test 11/03/19  0048 11/02/19  1406   TROPI <0.015 <0.015     Most Recent 3 BNP's:No lab results found.  Most Recent D-dimer:No lab results found.  Most Recent Cholesterol Panel:  Recent Labs   Lab Test 06/27/23  1103   CHOL 324*   *   HDL 38*   TRIG 303*     Most Recent 6 Bacteria Isolates From Any Culture (See EPIC Reports for Culture Details):No lab results found.  Most Recent TSH and T4:  Recent Labs   Lab Test 09/28/23  0937   TSH 4.67*   T4 1.16

## 2023-09-29 ENCOUNTER — TELEPHONE (OUTPATIENT)
Dept: INTERNAL MEDICINE | Facility: CLINIC | Age: 73
End: 2023-09-29
Payer: COMMERCIAL

## 2023-09-29 VITALS
HEIGHT: 59 IN | DIASTOLIC BLOOD PRESSURE: 59 MMHG | SYSTOLIC BLOOD PRESSURE: 107 MMHG | TEMPERATURE: 97.8 F | RESPIRATION RATE: 16 BRPM | WEIGHT: 131.7 LBS | HEART RATE: 58 BPM | BODY MASS INDEX: 26.55 KG/M2 | OXYGEN SATURATION: 92 %

## 2023-09-29 PROCEDURE — 99239 HOSP IP/OBS DSCHRG MGMT >30: CPT | Performed by: STUDENT IN AN ORGANIZED HEALTH CARE EDUCATION/TRAINING PROGRAM

## 2023-09-29 PROCEDURE — G0378 HOSPITAL OBSERVATION PER HR: HCPCS

## 2023-09-29 PROCEDURE — 250N000013 HC RX MED GY IP 250 OP 250 PS 637: Performed by: STUDENT IN AN ORGANIZED HEALTH CARE EDUCATION/TRAINING PROGRAM

## 2023-09-29 PROCEDURE — 250N000013 HC RX MED GY IP 250 OP 250 PS 637: Performed by: INTERNAL MEDICINE

## 2023-09-29 RX ORDER — CLOPIDOGREL BISULFATE 75 MG/1
75 TABLET ORAL DAILY
Qty: 90 TABLET | Refills: 0 | Status: SHIPPED | OUTPATIENT
Start: 2023-09-29 | End: 2023-09-29

## 2023-09-29 RX ORDER — ASPIRIN 81 MG/1
162 TABLET, CHEWABLE ORAL DAILY
Qty: 180 TABLET | Refills: 3 | COMMUNITY
Start: 2023-09-29

## 2023-09-29 RX ORDER — CLOPIDOGREL BISULFATE 75 MG/1
75 TABLET ORAL DAILY
Qty: 90 TABLET | Refills: 0 | Status: SHIPPED | OUTPATIENT
Start: 2023-09-29 | End: 2024-09-23

## 2023-09-29 RX ADMIN — ASPIRIN 81 MG 162 MG: 81 TABLET ORAL at 08:17

## 2023-09-29 RX ADMIN — SENNOSIDES AND DOCUSATE SODIUM 1 TABLET: 50; 8.6 TABLET ORAL at 08:18

## 2023-09-29 RX ADMIN — CARVEDILOL 6.25 MG: 6.25 TABLET, FILM COATED ORAL at 08:17

## 2023-09-29 RX ADMIN — PRAVASTATIN SODIUM 40 MG: 40 TABLET ORAL at 08:17

## 2023-09-29 ASSESSMENT — ACTIVITIES OF DAILY LIVING (ADL)
ADLS_ACUITY_SCORE: 18

## 2023-09-29 NOTE — PLAN OF CARE
Goal Outcome Evaluation:         Summary: Altered mental status    DATE & TIME: 09/28/2023-09/29/2023 [id number]     Cognitive Concerns/ Orientation: A&O x4  BEHAVIOR & AGGRESSION TOOL COLOR: Green   ABNL VS/O2: VSS on RA  MOBILITY: IND   PAIN MANAGMENT: Denied pain this shift   DIET: Regular  BOWEL/BLADDER: Continent of B/B  ABNL LAB/BG: TSH 4.67   DRAIN/DEVICES: R PIV SL  TELEMETRY RHYTHM: Sinus Gabriele  SKIN: WDL  TESTS/PROCEDURES: None this shift   D/C DAY/GOALS/PLACE: Pending full workup and plan forward 1-2 days    OTHER IMPORTANT INFO: Neurology consulted, family requested paper copies of imaging - signed release of information form

## 2023-09-29 NOTE — PROGRESS NOTES
Discharge    Patient discharged to home with .  Care plan note. AVS medication list reviewed with patient and . Hard copies of test results given to patient.    Listed belongings gathered and given to patient (including from security/pharmacy). Yes  Care Plan and Patient education resolved: Yes  Prescriptions if needed, hard copies sent with patient  NA  Medication Bin checked and emptied on discharge Yes  SW/care coordinator/charge RN aware of discharge: Yes

## 2023-09-29 NOTE — TELEPHONE ENCOUNTER
Spoke to Patient.  Patient refused to see any NP and stated it had to be a MD.  Per her ok, scheduled with Dr. Lind on 10/31/23.

## 2023-09-29 NOTE — PROGRESS NOTES
Care Management / Stroke or TIA chart review by CM RN;     pt has  + established PCP at Clinic: [facility], Provider: Dr. Lind, Phone: [phone]      CM-RN called clinic to request nurse reach out to pt/family and fit pt in to provider appointment within 1 wk  + Specialty referrals are in place including neurology on discharge AVS with phone numbers--a  will contact patient  Adult Neurology  Referral   Please be aware that coverage of these services is subject to the terms and limitations of your health insurance plan.  Call member services at your health plan with any benefit or coverage questions.  [facility] will call you to coordinate your care as prescribed by your provider. If you don't hear from a representative within 2 business days, please call [phone].   Reason for Referral: General Neurology   Scheduling Instructions: [facility] will call you to coordinate your care as prescribed by your provider. If you don't hear from a representative within 2 business days, please call [phone].   Additional Information: Event of transient global amnesia, on DAPT for R carotid/vert soft plaque, follow up in clinic   + Stroke risk factors and teaching documented in EPIC and on AVS   + medications Rx'd and filled at hospital prior to discharge   + active insurance BCBS/BCBS MEDICARE ADVANTAGE   + physically mobilizing at baseline level of independence      No CM needs identified.       Cecille Valle RN, BSN, PHN  [facility]  Inpatient Care Management - FLOAT

## 2023-09-29 NOTE — DISCHARGE SUMMARY
"[facility]  Hospitalist Discharge Summary      Date of Admission:  9/27/2023  Date of Discharge:  9/29/2023  Discharging Provider: Leonel Harris MD  Discharge Service: Hospitalist Service    Discharge Diagnoses     Transient Global Amnesia  TIA??    Clinically Significant Risk Factors     # Overweight: Estimated body mass index is 26.6 kg/m  as calculated from the following:    Height as of this encounter: 1.499 m (4' 11\").    Weight as of this encounter: 59.7 kg (131 lb 11.2 oz).       Follow-ups Needed After Discharge   Follow-up Appointments     Follow-up and recommended labs and tests       Follow up with primary care provider, Lucia Lind, within 7 days   for hospital follow- up.  No follow up labs or test are needed.            Unresulted Labs Ordered in the Past 30 Days of this Admission       No orders found from 8/28/2023 to 9/28/2023.          Discharge Disposition   Discharged to home  Condition at discharge: Stable    Hospital Course   Kalani Rowan is a 73 year old female with past medical hx of CAD, s/p 3v-CABG,HTN, hyperlipidemia who is brought in to the hospital by family for altered mental status, short term memory loss. She is admitted under observation for the following issues.     Global amnesia  DDx Transient global amnesia vs CVA, vs seizure vs due to accelerated htn  *pt's birthday today and was cooking today. Later complained of not feeling well to her . BP elevated to 200 when  checked it. She also reported mild chest discomfort per  which he reports he gave her ASA. That seems to have resolved. No other complaints, patient does not recall events of earlier today and repeatedly asking the same question over and over again in the ED. Although, towards the end of my visit she starting to remember being in ambulance.   *Stroke neuro consulted in ED.   *CT head was negative for acute finding. CTA head was normal. CTA neck showed focal " mod-severe stenosis of right vertebral artery. Right ICA 50% stenosis.   *UA negative. CXR without acute finding.   *Neurology felt pathophysiology of TGA is unknown, however, transient ischemia has been proposed as one of the mechanisms. The patient does have R vert origin stenosis with a soft plaque.   Plan:  - MRI brain -> No recent infarct, intracranial mass or obvious intracranial hemorrhage.  Mild generalized volume loss.  - EEG -> electroencephalogram is abnormal due to the presences of intermittent generalized theta slowing consistent with a mild encephalopathy. No electrographic seizures or epileptiform discharges were recorded    - TSH wnl  - Folate and  b12 wnl  - neurology consulted -> ASA 81. Recommend adding plavix for 3 months and optimizing risk factors control.   - neuro checks q 4 h  - ECHO -> Hyperdynamic left ventricular function. The visual ejection fraction is >70%. Mild hypokinsis of distal anteroseptum and LV apex. Mildly decreased right ventricular systolic function. Hypokinesis of RV apex. No hemodynamically significant valvular disease. Compared to the previous echocardiogram, there are no significant changes.    Right ICA 50% stenosis  focal mod-severe stenosis of right vertebral artery, similar to prior per CT report  - resume PTA stain, ASA  - Defer to neuro for further management    Accelerated htn  */86 on presentation. Has received Hydralazine 10mg in ED. SBP improved to 140s while in ED  - resume PTA meds including Coreg    CAD  Hx of 3v-CABG-9/2022  Per pt's , patient had mild chest discomfort and that went away after he gave her ASA. The main concern was confusion. EKG shows SR, incomplete RBBB. Currently has no chest pain. Trop x1 is negative.   - statin ,ASA    Consultations This Hospital Stay   NEUROLOGY IP STROKE CONSULT    Code Status   Full Code    Time Spent on this Encounter   I, Leonel Harris MD, personally saw the patient today and spent greater than 30  minutes discharging this patient.       Leonel Harris MD  [place] [address] [facility]  [address] [place] [address]  Phone: [phone]  ______________________________________________________________________    Physical Exam   Vital Signs: Temp: 97.8  F (36.6  C) Temp src: Oral BP: 107/59 Pulse: 58   Resp: 16 SpO2: 92 % O2 Device: None (Room air)    Weight: 131 lbs 11.2 oz  ----------------------------------------------------------------------------------------       Primary Care Physician   Lucia Lind    Discharge Orders      Adult Neurology  Referral      Reason for your hospital stay    You started feeling unwell and confuse and was evaluated by our Neurology team for possible stroke or seizure disorder.     Follow-up and recommended labs and tests     Follow up with primary care provider, Lucia Lind, within 7 days for hospital follow- up.  No follow up labs or test are needed.     Activity    Your activity upon discharge: activity as tolerated     Diet    Follow this diet upon discharge: Orders Placed This Encounter      Room Service      Regular Diet Adult       Significant Results and Procedures   Most Recent 3 CBC's:  Recent Labs   Lab Test 09/27/23  1651 05/31/23  1502 12/13/22  1100   WBC 8.6 6.2 6.0   HGB 15.3 15.4 14.5   MCV 88 90 87    293 281     Most Recent 3 BMP's:  Recent Labs   Lab Test 09/28/23  0937 09/27/23  1651 06/27/23  1103    140 140   POTASSIUM 3.7 3.5 4.5   CHLORIDE 105 102 106   CO2 20* 22 23   BUN 16.2 17.0 19.8   CR 0.74 0.72 0.67   ANIONGAP 16* 16* 11   APRIL 10.0 10.0 9.5   * 106* 97     Most Recent 2 LFT's:  Recent Labs   Lab Test 09/28/23  0937 06/27/23  1103   AST 22 23   ALT 26 25   ALKPHOS 122* 114*   BILITOTAL 0.4 0.4     Most Recent 3 INR's:  Recent Labs   Lab Test 09/02/22  1429 09/02/22  1226 11/02/19  1406   INR 1.26* 1.55* 0.88     Most Recent 3 Troponin's:  Recent Labs   Lab Test 11/03/19  0048  11/02/19  1406   TROPI <0.015 <0.015     Most Recent 3 BNP's:No lab results found.  Most Recent D-dimer:No lab results found.  Most Recent Cholesterol Panel:  Recent Labs   Lab Test 09/28/23  0937   CHOL 265*   *   HDL 49*   TRIG 254*     Most Recent TSH and T4:  Recent Labs   Lab Test 09/28/23  0937   TSH 4.67*   T4 1.16   ,   Results for orders placed or performed during the hospital encounter of 09/27/23   Head CT w/o contrast    Narrative    EXAM: CT HEAD W/O CONTRAST, CTA HEAD NECK W CONTRAST  LOCATION: [facility]  DATE: 9/27/2023    INDICATION: AMS  COMPARISON: 11/02/2019  CONTRAST: 67mL Isovue 370  TECHNIQUE: Head and neck CT angiogram with IV contrast. Noncontrast head CT followed by axial helical CT images of the head and neck vessels obtained during the arterial phase of intravenous contrast administration. Axial 2D reconstructed images and   multiplanar 3D MIP reconstructed images of the head and neck vessels were performed by the technologist. Dose reduction techniques were used. All stenosis measurements made according to NASCET criteria unless otherwise specified.    FINDINGS:   NONCONTRAST HEAD CT:   INTRACRANIAL CONTENTS: No intracranial hemorrhage, extraaxial collection, or mass effect.  No CT evidence of acute infarct. Mild presumed chronic small vessel ischemic changes. Mild generalized volume loss. No hydrocephalus.     VISUALIZED ORBITS/SINUSES/MASTOIDS: No intraorbital abnormality. No paranasal sinus mucosal disease. No middle ear or mastoid effusion.    BONES/SOFT TISSUES: No acute abnormality.    HEAD CTA:  ANTERIOR CIRCULATION: No stenosis/occlusion, aneurysm, or high flow vascular malformation. Standard [place] anatomy.    POSTERIOR CIRCULATION: No stenosis/occlusion, aneurysm, or high flow vascular malformation. Balanced vertebral arteries supply a normal basilar artery.     DURAL VENOUS SINUSES: Expected enhancement of the major dural venous  sinuses.    NECK CTA:  RIGHT CAROTID: Atherosclerotic plaque results in 50% stenosis in the right ICA. No dissection.    LEFT CAROTID: No measurable stenosis or dissection. Nonstenotic atherosclerotic calcification at the bifurcation.    VERTEBRAL ARTERIES: There is focal moderate to severe stenosis of the right vertebral artery origin is similar prior. This may be secondary to mixed foraminal and noncalcified plaque, small ulcerated plaque, or focal partially thrombosed blister type   aneurysm. Balanced vertebral arteries.    AORTIC ARCH: Classic aortic arch anatomy with no significant stenosis at the origin of the great vessels.    NONVASCULAR STRUCTURES: Unremarkable.      Impression    IMPRESSION:   HEAD CT:  1.  No CT evidence for acute intracranial process.  2.  Brain atrophy and presumed chronic microvascular ischemic changes as above.    HEAD CTA:   1.  Normal CTA [place].    NECK CTA:  1.  Focal moderate to severe stenosis of the right vertebral artery as described above. There is grossly similar prior.  2.  50% stenosis of the right cervical internal carotid artery origin.   Chest XR,  PA & LAT    Narrative    EXAM: XR CHEST 2 VIEWS  LOCATION: [facility]  DATE: 9/27/2023    INDICATION: AMS  COMPARISON: 4/7/2023      Impression    IMPRESSION: Sternotomy. Heart normal in size. Lungs are clear.   CTA Head Neck with Contrast    Narrative    EXAM: CT HEAD W/O CONTRAST, CTA HEAD NECK W CONTRAST  LOCATION: [facility]  DATE: 9/27/2023    INDICATION: AMS  COMPARISON: 11/02/2019  CONTRAST: 67mL Isovue 370  TECHNIQUE: Head and neck CT angiogram with IV contrast. Noncontrast head CT followed by axial helical CT images of the head and neck vessels obtained during the arterial phase of intravenous contrast administration. Axial 2D reconstructed images and   multiplanar 3D MIP reconstructed images of the head and neck vessels were performed by the technologist.  Dose reduction techniques were used. All stenosis measurements made according to NASCET criteria unless otherwise specified.    FINDINGS:   NONCONTRAST HEAD CT:   INTRACRANIAL CONTENTS: No intracranial hemorrhage, extraaxial collection, or mass effect.  No CT evidence of acute infarct. Mild presumed chronic small vessel ischemic changes. Mild generalized volume loss. No hydrocephalus.     VISUALIZED ORBITS/SINUSES/MASTOIDS: No intraorbital abnormality. No paranasal sinus mucosal disease. No middle ear or mastoid effusion.    BONES/SOFT TISSUES: No acute abnormality.    HEAD CTA:  ANTERIOR CIRCULATION: No stenosis/occlusion, aneurysm, or high flow vascular malformation. Standard [place] anatomy.    POSTERIOR CIRCULATION: No stenosis/occlusion, aneurysm, or high flow vascular malformation. Balanced vertebral arteries supply a normal basilar artery.     DURAL VENOUS SINUSES: Expected enhancement of the major dural venous sinuses.    NECK CTA:  RIGHT CAROTID: Atherosclerotic plaque results in 50% stenosis in the right ICA. No dissection.    LEFT CAROTID: No measurable stenosis or dissection. Nonstenotic atherosclerotic calcification at the bifurcation.    VERTEBRAL ARTERIES: There is focal moderate to severe stenosis of the right vertebral artery origin is similar prior. This may be secondary to mixed foraminal and noncalcified plaque, small ulcerated plaque, or focal partially thrombosed blister type   aneurysm. Balanced vertebral arteries.    AORTIC ARCH: Classic aortic arch anatomy with no significant stenosis at the origin of the great vessels.    NONVASCULAR STRUCTURES: Unremarkable.      Impression    IMPRESSION:   HEAD CT:  1.  No CT evidence for acute intracranial process.  2.  Brain atrophy and presumed chronic microvascular ischemic changes as above.    HEAD CTA:   1.  Normal CTA [place].    NECK CTA:  1.  Focal moderate to severe stenosis of the right vertebral artery as described above.  There is grossly similar prior.  2.  50% stenosis of the right cervical internal carotid artery origin.   MR Brain w/o Contrast    Narrative    EXAM: MR BRAIN W/O CONTRAST  LOCATION: [facility]  DATE: 2023    INDICATION: AMS  COMPARISON: CT/CTA 2023  TECHNIQUE: Routine multiplanar multisequence head MRI without intravenous contrast.    FINDINGS:  INTRACRANIAL CONTENTS: No acute or subacute infarct. No mass, acute hemorrhage, or extra-axial fluid collections. Normal brain parenchymal signal. Mild generalized cerebral atrophy. No hydrocephalus. Normal position of the cerebellar tonsils.     SELLA: No abnormality accounting for technique.    OSSEOUS STRUCTURES/SOFT TISSUES: Normal marrow signal. The major intracranial vascular flow voids are maintained.     ORBITS: No abnormality accounting for technique.     SINUSES/MASTOIDS: No paranasal sinus mucosal disease. No middle ear or mastoid effusion.       Impression    IMPRESSION:  1.  No recent infarct, intracranial mass or obvious intracranial hemorrhage.  2.  Mild generalized volume loss.   Echocardiogram Complete     Value    LVEF  >70%    Narrative    [id number]  [id number]  [id number]  [id number]^TRISTIAN^LIZ^REE DASILVA     [facility]  Echocardiography Laboratory  [address] [place] [address]     Name: KATERIN LUNA  MRN: [id number]  : 1950  Study Date: 2023 12:35 PM  Age: 73 yrs  Gender: Female  Patient [facility]  Reason For Study: CVA  Ordering Physician: LIZ LUBIN  Referring Physician: Lucia Lind  Performed By: Ramona Jade RDCS     BSA: 1.5 m2  Height: 59 in  Weight: 131 lb  HR: 59  BP: 129/70 mmHg  ______________________________________________________________________________  Procedure  Complete Portable Echo Adult.  ______________________________________________________________________________  Interpretation Summary     Hyperdynamic left ventricular function. The  visual ejection fraction is >70%.  Mild hypokinsis of distal anteroseptum and LV apex.  Mildly decreased right ventricular systolic function. Hypokinesis of RV apex.  No hemodynamically significant valvular disease.  Compared to the previous echocardiogram, there are no significant changes.  ______________________________________________________________________________  Left Ventricle  The left ventricle is normal in size. There is normal left ventricular wall  thickness. Hyperdynamic left ventricular function. The visual ejection  fraction is >70%. Grade I or early diastolic dysfunction. Mild hypokinsis of  distal anteroseptum and LV apex.     Right Ventricle  Mildly decreased right ventricular systolic function. Hypokinesis of RV apex.     Atria  Normal left atrial size. Right atrial size is normal.     Mitral Valve  The mitral valve leaflets appear normal. There is no evidence of stenosis,  fluttering, or prolapse. There is mild mitral annular calcification. There is  trace mitral regurgitation.     Tricuspid Valve  Normal tricuspid valve. There is trace tricuspid regurgitation.     Aortic Valve  There is mild trileaflet aortic sclerosis. There is trace aortic  regurgitation.     Pulmonic Valve  The pulmonic valve is not well visualized.     Vessels  Normal size aorta. IVC diameter <2.1 cm collapsing >50% with sniff suggests a  normal RA pressure of 3 mmHg.     Pericardium  There is no pericardial effusion.     Rhythm  Sinus rhythm was noted.  ______________________________________________________________________________  MMode/2D Measurements & Calculations  IVSd: 1.0 cm     LVIDd: 3.8 cm  LVIDs: 2.2 cm  LVPWd: 1.1 cm  FS: 41.8 %  LV mass(C)d: 125.8 grams  LV mass(C)dI: 81.7 grams/m2  Ao root diam: 2.9 cm  LA dimension: 4.0 cm  asc Aorta Diam: 2.9 cm  LA/Ao: 1.4  LVOT diam: 1.8 cm  LVOT area: 2.7 cm2  Ao root diam index Ht(cm/m): 1.9  Ao root diam index BSA (cm/m2): 1.9  Asc Ao diam index BSA (cm/m2): 1.9  Asc  Ao diam index Ht(cm/m): 2.0  LA Volume (BP): 36.5 ml     LA Volume Index (BP): 23.7 ml/m2  RWT: 0.58  TAPSE: 1.1 cm     Doppler Measurements & Calculations  MV E max rashard: 74.9 cm/sec  MV A max rashard: 98.5 cm/sec  MV E/A: 0.76  MV dec time: 0.28 sec  E/E' av.6  Lateral E/e': 6.5  Medial E/e': 10.8  RV S Rashard: 8.5 cm/sec     ______________________________________________________________________________  Report approved by: Radha Guido 2023 02:55 PM             Discharge Medications   Current Discharge Medication List        START taking these medications    Details   clopidogrel (PLAVIX) 75 MG tablet Take 1 tablet (75 mg) by mouth daily  Qty: 90 tablet, Refills: 0    Associated Diagnoses: Encephalopathy           CONTINUE these medications which have CHANGED    Details   aspirin (ASA) 81 MG chewable tablet Take 2 tablets (162 mg) by mouth daily  Qty: 180 tablet, Refills: 3    Associated Diagnoses: S/P CABG (coronary artery bypass graft)           CONTINUE these medications which have NOT CHANGED    Details   acetaminophen (TYLENOL) 325 MG tablet Take 2 tablets (650 mg) by mouth every 6 hours as needed for other (For optimal non-opioid multimodal pain management to improve pain control.)  Qty: 100 tablet, Refills: 0    Associated Diagnoses: S/P CABG (coronary artery bypass graft)      albuterol (PROAIR HFA) 108 (90 Base) MCG/ACT inhaler Inhale 1-2 puffs into the lungs every 6 hours as needed for shortness of breath  Qty: 18 g, Refills: 4    Comments: Pharmacy may dispense brand covered by insurance (Proair, or proventil or ventolin or generic albuterol inhaler)  Associated Diagnoses: Mild intermittent asthma, unspecified whether complicated      carvedilol (COREG) 6.25 MG tablet Take 1 tablet (6.25 mg) by mouth 2 times daily (with meals)  Qty: 180 tablet, Refills: 3    Associated Diagnoses: Essential (primary) hypertension      Cholecalciferol (VITAMIN D3) 1.25 MG (87262 UT) TABS Take 5,000 mcg by mouth  daily      Coenzyme Q10 (COQ10) 100 MG CAPS Take 100 mg by mouth daily      fluticasone (FLONASE) 50 MCG/ACT nasal spray Spray 50 mcg in nostril as needed for allergies      icosapent ethyl (VASCEPA) 1 g CAPS capsule Take 2 g by mouth 2 times daily (with meals)  Qty: 360 capsule, Refills: 3    Associated Diagnoses: Hypercholesteremia; Hyperlipidemia LDL goal <130      Multiple Vitamin (MULTIVITAMIN ADULT PO) Take 1 tablet by mouth daily      Multiple Vitamins-Minerals (PRESERVISION AREDS) CAPS Take 1 capsule by mouth daily      multivitamin w/minerals (THERA-VIT-M) tablet Take 1 tablet by mouth daily      nitroGLYcerin (NITROSTAT) 0.4 MG sublingual tablet For chest pain place 1 tablet under the tongue every 5 minutes for 3 doses. If symptoms persist 5 minutes after 1st dose call 911.  Qty: 20 tablet, Refills: 3    Associated Diagnoses: S/P CABG (coronary artery bypass graft)      polyethylene glycol (MIRALAX) 17 GM/Dose powder Take 17 g by mouth daily as needed for constipation  Qty: 510 g, Refills: 0    Associated Diagnoses: S/P CABG (coronary artery bypass graft)      pravastatin (PRAVACHOL) 40 MG tablet Take 1 tablet (40 mg) by mouth daily  Qty: 90 tablet, Refills: 3    Associated Diagnoses: Hypercholesteremia; Hyperlipidemia LDL goal <130           Allergies   Allergies   Allergen Reactions    Elderberry [Sambucus Canadensis] Anaphylaxis     Elderberry syrup- ER.     Codeine     [place] N[place] Time Itching    Other Environmental Allergy      Pine sol causes anylphylaxis    Atorvastatin Rash

## 2023-09-29 NOTE — PLAN OF CARE
Summary:  Altered mental status  DATE & TIME: 9/28/23 9115-2716   Cognitive Concerns/ Orientation : A&O x4 forgetful of yesterday (day of admission) but besides that intact.  BEHAVIOR & AGGRESSION TOOL COLOR: Green  CIWA SCORE: NA   ABNL VS/O2: VSS on RA (BP 100s-110s)  MOBILITY: up independent in room.  PAIN MANAGMENT: Denied  DIET: Regular; good intake  BOWEL/BLADDER: Continent of B/B, c/o constipation (pt baseline), given scheduled Senna  ABNL LAB/BG: TSH 4.67   DRAIN/DEVICES: PIV SL  TELEMETRY RHYTHM: SB-NSR  SKIN: WDL  TESTS/PROCEDURES: EEG and cardiac echo completed in AM  D/C DAY/GOALS/PLACE: Pending full workup and plan forward 1-2 days  OTHER IMPORTANT INFO: spouse (a MD) was here and supportive, neurology consulted, family requested paper copies of imaging - signed release of information form

## 2023-09-29 NOTE — TELEPHONE ENCOUNTER
Reason for Call:  Appointment Request    Patient requesting this type of appt:  Hospital/ED Follow-Up     Requested provider: Lucia Lind    Reason patient unable to be scheduled: Not within requested timeframe    When does patient want to be seen/preferred time: 3-7 days    Comments: Being discharged from [facility] 09/29 for TIA and needs follow up within a week    Could we send this information to you in [facility] or would you prefer to receive a phone call?:   Patient would prefer a phone call   Okay to leave a detailed message?: Yes at Cell number on file:    Telephone Information:   Mobile [phone]       Call taken on 9/29/2023 at 11:30 AM by Selam Boyer

## 2023-10-01 ENCOUNTER — TELEPHONE (OUTPATIENT)
Dept: CARDIOLOGY | Facility: CLINIC | Age: 73
End: 2023-10-01
Payer: COMMERCIAL

## 2023-10-01 ENCOUNTER — PATIENT OUTREACH (OUTPATIENT)
Dept: CARE COORDINATION | Facility: CLINIC | Age: 73
End: 2023-10-01
Payer: COMMERCIAL

## 2023-10-01 NOTE — TELEPHONE ENCOUNTER
"Patient called in with concerns regarding blood pressure with systolic as high as 180. She is taking coreg 6.25mg and took her first dose late because she slept in. She says she feels a little \"off\" but denies blurred vision, confusion, chest pain, shortness of breath, oliguria, swelling, headache, dizzyness. I asked her to retake her blood pressure with proper form after thirty minutes of resting. If it remains elevated she can take an extra dose of carvedilol. She should reach out to her primary care team in the morning for additional recommendations.    She was educated that if her BP continues to increase and / or she develops any red flags symptoms including but not limited to headache, dizzyness, blurred vision, numbness /tingling, confusion, chest pain, SOB, LE edema, orthopnea / PND, oliguria then she needs to seek emergency care.   "

## 2023-10-01 NOTE — PROGRESS NOTES
Connected Care Resource Center Contact  [facility]/Voicemail     Clinical Data: Post-Discharge Outreach     Outreach attempted x 2.  Left message on patient's voicemail, providing [facility]'s 24/7 scheduling and nurse triage phone number [phone] ([phone]) for questions/concerns and/or to schedule an appt with an [facility] provider, if they do not have a PCP.      Plan:  [facility] will do no further outreaches at this time.       Aracelis Waddell MA  Connected Care Resource Center, [facility]    *Connected Care Resource Team does NOT follow patient ongoing. Referrals are identified based on internal discharge reports and the outreach is to ensure patient has an understanding of their discharge instructions.

## 2023-10-02 ENCOUNTER — TELEPHONE (OUTPATIENT)
Dept: CARDIOLOGY | Facility: CLINIC | Age: 73
End: 2023-10-02
Payer: COMMERCIAL

## 2023-10-02 NOTE — TELEPHONE ENCOUNTER
[facility] Call Center    Phone Message    May a detailed message be left on voicemail: yes     Reason for Call: Other: Patient called regarding her blood pressure not getting better and she is concerned and would like to discuss ASAP.     Action Taken: Other: Cardiology    Travel Screening: Not Applicable

## 2023-10-02 NOTE — TELEPHONE ENCOUNTER
Contacted patient to review further. Patient states that she spoke with the on call cardiologist yesterday (see note in Epic) regarding her elevated BP. Patient states that her elevated BP started last Wednesday, and she went to the ER and was admitted from 9/27-9/29 for a TIA. Notes are available in Epic. Patient states that the cardiologist on call advised her to take an extra carvedilol, which the patient did, but she is still having elevated readings. Patient states that her BP has been in the 140s-150s (HR mid 50s) systolic since yesterday, which is improved from what is was before (170s-200s systolic), but she is still concerned. Patient denies any other symptoms. Patient is also concerned about the Plavix that was prescribed, and isn't sure if her BP being high is due to that. Patient also has questions as to why the Plavix is needed. Advised patient to ask neurology about those questions, as neurology put her on that medication. Patient states that she doesn't have a neurologist yet and her PCP is on leave. Will route to Dr. Villalobos for review.

## 2023-11-19 ENCOUNTER — HEALTH MAINTENANCE LETTER (OUTPATIENT)
Age: 73
End: 2023-11-19

## 2024-02-06 ENCOUNTER — MEDICAL CORRESPONDENCE (OUTPATIENT)
Dept: SCHEDULING | Facility: CLINIC | Age: 74
End: 2024-02-06
Payer: COMMERCIAL

## 2024-04-16 ENCOUNTER — PATIENT OUTREACH (OUTPATIENT)
Dept: INTERNAL MEDICINE | Facility: CLINIC | Age: 74
End: 2024-04-16
Payer: COMMERCIAL

## 2024-04-16 NOTE — TELEPHONE ENCOUNTER
Patient Quality Outreach    Patient is due for the following:   Asthma  -  ACT needed and AAP      Topic Date Due    Diptheria Tetanus Pertussis (DTAP/TDAP/TD) Vaccine (1 - Tdap) Never done    Zoster (Shingles) Vaccine (1 of 2) Never done    Flu Vaccine (1) 09/01/2023    COVID-19 Vaccine (2 - 2023-24 season) 09/01/2023       Next Steps:   Patient was assigned appropriate questionnaire to complete    Type of outreach:    Sent [employer] message.      Questions for provider review:    None           Kiah Camilo LPN  Chart routed to none.

## 2024-04-22 ENCOUNTER — OFFICE VISIT (OUTPATIENT)
Dept: CARDIOLOGY | Facility: CLINIC | Age: 74
End: 2024-04-22
Attending: INTERNAL MEDICINE
Payer: COMMERCIAL

## 2024-04-22 VITALS
WEIGHT: 137 LBS | HEART RATE: 66 BPM | SYSTOLIC BLOOD PRESSURE: 128 MMHG | DIASTOLIC BLOOD PRESSURE: 70 MMHG | BODY MASS INDEX: 27.62 KG/M2 | HEIGHT: 59 IN

## 2024-04-22 DIAGNOSIS — R22.42 LOCALIZED SWELLING, MASS AND LUMP, LEFT LOWER LIMB: ICD-10-CM

## 2024-04-22 DIAGNOSIS — E55.9 VITAMIN D DEFICIENCY, UNSPECIFIED: ICD-10-CM

## 2024-04-22 DIAGNOSIS — E78.00 HYPERCHOLESTEREMIA: ICD-10-CM

## 2024-04-22 DIAGNOSIS — I10 ESSENTIAL (PRIMARY) HYPERTENSION: ICD-10-CM

## 2024-04-22 DIAGNOSIS — Z95.1 S/P CABG (CORONARY ARTERY BYPASS GRAFT): ICD-10-CM

## 2024-04-22 DIAGNOSIS — I25.728 ATHEROSCLEROSIS OF AUTOLOGOUS ARTERY CORONARY ARTERY BYPASS GRAFT(S) WITH OTHER FORMS OF ANGINA PECTORIS (H): ICD-10-CM

## 2024-04-22 PROCEDURE — 99215 OFFICE O/P EST HI 40 MIN: CPT | Performed by: INTERNAL MEDICINE

## 2024-04-22 RX ORDER — CARVEDILOL 6.25 MG/1
6.25 TABLET ORAL 2 TIMES DAILY WITH MEALS
Qty: 180 TABLET | Refills: 3 | Status: SHIPPED | OUTPATIENT
Start: 2024-04-22

## 2024-04-22 RX ORDER — ROSUVASTATIN CALCIUM 10 MG/1
10 TABLET, COATED ORAL DAILY
COMMUNITY

## 2024-04-22 NOTE — PATIENT INSTRUCTIONS
Try stopping the carvedilol for three days and see if your energy is better and if the skipping heart beats feel better.    If they feel worse, then go back on the medication and let me know.    If the blood pressure goes up above 130-140 then I would start that lisinopril 2.5mg.    If you don't feel much different off the carvedilol then go back on it.

## 2024-04-22 NOTE — PROGRESS NOTES
"Cardiology Progress Note          Assessment and Plan:       Coronary artery disease with history of CABG  Currently with symptoms of fatigue, trial off beta-blocker and consider adding back lisinopril if blood pressure increases.  Continue rosuvastatin, would ideally like to increase this medication but patient declines because of rash.    Patient and her  have a follow-up in September with me already scheduled.  They declined to see ARMANI's.    40 minutes was spent with the patient, precharting and reviewing tests as well as post visit charting all done today..    This note was transcribed using electronic voice recognition software and there may be typographical errors present.                    Interval History:     The patient is a very pleasant 73 year old whom I have been following for coronary artery disease requiring three-vessel CABG.  She still is fatigued, she is on a small dose of beta-blocker.  She feels some occasional palpitations.  She was on atorvastatin and thought she had hives, switched to pravastatin with suboptimal control.  Now on a small dose of rosuvastatin with LDL 82.    We talked about increasing the statin but she does not want to at this time.    We talked about lisinopril and how it is a good medication.  She had been on 10 mg in the past but did not tolerate it at that time.                       Review of Systems:     Review of Systems:  Skin:        Eyes:       ENT:       Respiratory:  Negative    Cardiovascular:    palpitations;Positive for;fatigue  Gastroenterology:      Genitourinary:       Musculoskeletal:       Neurologic:       Psychiatric:       Heme/Lymph/Imm:       Endocrine:                   Physical Exam:     Vitals: /70   Pulse 66   Ht 1.499 m (4' 11\")   Wt 62.1 kg (137 lb)   LMP  (LMP Unknown)   BMI 27.67 kg/m    Constitutional:  cooperative, alert and oriented, well developed, well nourished, in no acute distress        Skin:  warm and dry to the " touch, no apparent skin lesions or masses noted        Head:  normocephalic, no masses or lesions        Eyes:  pupils equal and round, conjunctivae and lids unremarkable, sclera white, no xanthalasma, EOMS intact, no nystagmus        Chest:  normal symmetry        Cardiac: regular rhythm;normal S1 and S2     no presence of murmur            Extremities and Back:  no deformities, clubbing, cyanosis, erythema observed        Neurological:  no gross motor deficits;affect appropriate                 Medications:     Current Outpatient Medications   Medication Sig Dispense Refill    acetaminophen (TYLENOL) 325 MG tablet Take 2 tablets (650 mg) by mouth every 6 hours as needed for other (For optimal non-opioid multimodal pain management to improve pain control.) 100 tablet 0    albuterol (PROAIR HFA) 108 (90 Base) MCG/ACT inhaler Inhale 1-2 puffs into the lungs every 6 hours as needed for shortness of breath 18 g 4    aspirin (ASA) 81 MG chewable tablet Take 2 tablets (162 mg) by mouth daily 180 tablet 3    carvedilol (COREG) 6.25 MG tablet Take 1 tablet (6.25 mg) by mouth 2 times daily (with meals) 180 tablet 3    Cholecalciferol (VITAMIN D3) 1.25 MG (53309 UT) TABS Take 5,000 mcg by mouth daily      Coenzyme Q10 (COQ10) 100 MG CAPS Take 100 mg by mouth daily      fluticasone (FLONASE) 50 MCG/ACT nasal spray Spray 50 mcg in nostril as needed for allergies      Multiple Vitamin (MULTIVITAMIN ADULT PO) Take 1 tablet by mouth daily      Multiple Vitamins-Minerals (PRESERVISION AREDS) CAPS Take 1 capsule by mouth daily      multivitamin w/minerals (THERA-VIT-M) tablet Take 1 tablet by mouth daily      nitroGLYcerin (NITROSTAT) 0.4 MG sublingual tablet For chest pain place 1 tablet under the tongue every 5 minutes for 3 doses. If symptoms persist 5 minutes after 1st dose call 911. 20 tablet 3    polyethylene glycol (MIRALAX) 17 GM/Dose powder Take 17 g by mouth daily as needed for constipation 510 g 0    rosuvastatin  (CRESTOR) 10 MG tablet Take 10 mg by mouth daily      clopidogrel (PLAVIX) 75 MG tablet Take 1 tablet (75 mg) by mouth daily (Patient not taking: Reported on 4/22/2024) 90 tablet 0                Data:   All laboratory data reviewed  No results found for this or any previous visit (from the past 24 hour(s)).    All laboratory data reviewed  Lab Results   Component Value Date    CHOL 265 09/28/2023    CHOL 377 11/02/2019     Lab Results   Component Value Date    HDL 49 09/28/2023    HDL 54 11/02/2019     Lab Results   Component Value Date     09/28/2023    LDL  11/02/2019     Cannot estimate LDL when triglyceride exceeds 400 mg/dL     11/02/2019     Lab Results   Component Value Date    TRIG 254 09/28/2023    TRIG 547 11/02/2019     Lab Results   Component Value Date    CHOLHDLRATIO 7.4 11/18/2013     TSH   Date Value Ref Range Status   09/28/2023 4.67 (H) 0.30 - 4.20 uIU/mL Final   12/13/2022 7.62 (H) 0.40 - 4.00 mU/L Final   11/02/2019 3.94 0.40 - 4.00 mU/L Final     Last Basic Metabolic Panel:  Lab Results   Component Value Date     09/28/2023     11/02/2019      Lab Results   Component Value Date    POTASSIUM 3.7 09/28/2023    POTASSIUM 4.2 12/13/2022    POTASSIUM 3.6 11/02/2019     Lab Results   Component Value Date    CHLORIDE 105 09/28/2023    CHLORIDE 108 12/13/2022    CHLORIDE 106 11/02/2019     Lab Results   Component Value Date    APRIL 10.0 09/28/2023    APRIL 9.6 11/02/2019     Lab Results   Component Value Date    CO2 20 09/28/2023    CO2 26 12/13/2022    CO2 23 11/02/2019     Lab Results   Component Value Date    BUN 16.2 09/28/2023    BUN 17 12/13/2022    BUN 19 11/02/2019     Lab Results   Component Value Date    CR 0.74 09/28/2023    CR 0.65 11/02/2019     Lab Results   Component Value Date     09/28/2023     12/13/2022     11/02/2019     Lab Results   Component Value Date    WBC 8.6 09/27/2023    WBC 6.9 11/02/2019     Lab Results   Component Value Date     RBC 5.07 09/27/2023    RBC 5.41 11/02/2019     Lab Results   Component Value Date    HGB 15.3 09/27/2023    HGB 16.6 11/02/2019     Lab Results   Component Value Date    HCT 44.7 09/27/2023    HCT 47.2 11/02/2019     Lab Results   Component Value Date    MCV 88 09/27/2023    MCV 87 11/02/2019     Lab Results   Component Value Date    MCH 30.2 09/27/2023    MCH 30.7 11/02/2019     Lab Results   Component Value Date    MCHC 34.2 09/27/2023    MCHC 35.2 11/02/2019     Lab Results   Component Value Date    RDW 12.8 09/27/2023    RDW 13.3 11/02/2019     Lab Results   Component Value Date     09/27/2023     11/02/2019

## 2024-04-22 NOTE — LETTER
"4/22/2024    BASHIR GUARDADO  [address]  [facility] [address]    RE: Kalani Rowan       Dear Colleague,     I had the pleasure of seeing Kalani Anum in the [facility].  Cardiology Progress Note          Assessment and Plan:       Coronary artery disease with history of CABG  Currently with symptoms of fatigue, trial off beta-blocker and consider adding back lisinopril if blood pressure increases.  Continue rosuvastatin, would ideally like to increase this medication but patient declines because of rash.    Patient and her  have a follow-up in September with me already scheduled.  They declined to see ARMANI's.    40 minutes was spent with the patient, precharting and reviewing tests as well as post visit charting all done today..    This note was transcribed using electronic voice recognition software and there may be typographical errors present.                    Interval History:     The patient is a very pleasant 73 year old whom I have been following for coronary artery disease requiring three-vessel CABG.  She still is fatigued, she is on a small dose of beta-blocker.  She feels some occasional palpitations.  She was on atorvastatin and thought she had hives, switched to pravastatin with suboptimal control.  Now on a small dose of rosuvastatin with LDL 82.    We talked about increasing the statin but she does not want to at this time.    We talked about lisinopril and how it is a good medication.  She had been on 10 mg in the past but did not tolerate it at that time.                       Review of Systems:     Review of Systems:  Skin:        Eyes:       ENT:       Respiratory:  Negative    Cardiovascular:    palpitations;Positive for;fatigue  Gastroenterology:      Genitourinary:       Musculoskeletal:       Neurologic:       Psychiatric:       Heme/Lymph/Imm:       Endocrine:                   Physical Exam:     Vitals: /70   Pulse 66   Ht 1.499 m (4' 11\") "   Wt 62.1 kg (137 lb)   LMP  (LMP Unknown)   BMI 27.67 kg/m    Constitutional:  cooperative, alert and oriented, well developed, well nourished, in no acute distress        Skin:  warm and dry to the touch, no apparent skin lesions or masses noted        Head:  normocephalic, no masses or lesions        Eyes:  pupils equal and round, conjunctivae and lids unremarkable, sclera white, no xanthalasma, EOMS intact, no nystagmus        Chest:  normal symmetry        Cardiac: regular rhythm;normal S1 and S2     no presence of murmur            Extremities and Back:  no deformities, clubbing, cyanosis, erythema observed        Neurological:  no gross motor deficits;affect appropriate                 Medications:     Current Outpatient Medications   Medication Sig Dispense Refill    acetaminophen (TYLENOL) 325 MG tablet Take 2 tablets (650 mg) by mouth every 6 hours as needed for other (For optimal non-opioid multimodal pain management to improve pain control.) 100 tablet 0    albuterol (PROAIR HFA) 108 (90 Base) MCG/ACT inhaler Inhale 1-2 puffs into the lungs every 6 hours as needed for shortness of breath 18 g 4    aspirin (ASA) 81 MG chewable tablet Take 2 tablets (162 mg) by mouth daily 180 tablet 3    carvedilol (COREG) 6.25 MG tablet Take 1 tablet (6.25 mg) by mouth 2 times daily (with meals) 180 tablet 3    Cholecalciferol (VITAMIN D3) 1.25 MG (61414 UT) TABS Take 5,000 mcg by mouth daily      Coenzyme Q10 (COQ10) 100 MG CAPS Take 100 mg by mouth daily      fluticasone (FLONASE) 50 MCG/ACT nasal spray Spray 50 mcg in nostril as needed for allergies      Multiple Vitamin (MULTIVITAMIN ADULT PO) Take 1 tablet by mouth daily      Multiple Vitamins-Minerals (PRESERVISION AREDS) CAPS Take 1 capsule by mouth daily      multivitamin w/minerals (THERA-VIT-M) tablet Take 1 tablet by mouth daily      nitroGLYcerin (NITROSTAT) 0.4 MG sublingual tablet For chest pain place 1 tablet under the tongue every 5 minutes for 3  doses. If symptoms persist 5 minutes after 1st dose call 911. 20 tablet 3    polyethylene glycol (MIRALAX) 17 GM/Dose powder Take 17 g by mouth daily as needed for constipation 510 g 0    rosuvastatin (CRESTOR) 10 MG tablet Take 10 mg by mouth daily      clopidogrel (PLAVIX) 75 MG tablet Take 1 tablet (75 mg) by mouth daily (Patient not taking: Reported on 4/22/2024) 90 tablet 0                Data:   All laboratory data reviewed  No results found for this or any previous visit (from the past 24 hour(s)).    All laboratory data reviewed  Lab Results   Component Value Date    CHOL 265 09/28/2023    CHOL 377 11/02/2019     Lab Results   Component Value Date    HDL 49 09/28/2023    HDL 54 11/02/2019     Lab Results   Component Value Date     09/28/2023    LDL  11/02/2019     Cannot estimate LDL when triglyceride exceeds 400 mg/dL     11/02/2019     Lab Results   Component Value Date    TRIG 254 09/28/2023    TRIG 547 11/02/2019     Lab Results   Component Value Date    CHOLHDLRATIO 7.4 11/18/2013     TSH   Date Value Ref Range Status   09/28/2023 4.67 (H) 0.30 - 4.20 uIU/mL Final   12/13/2022 7.62 (H) 0.40 - 4.00 mU/L Final   11/02/2019 3.94 0.40 - 4.00 mU/L Final     Last Basic Metabolic Panel:  Lab Results   Component Value Date     09/28/2023     11/02/2019      Lab Results   Component Value Date    POTASSIUM 3.7 09/28/2023    POTASSIUM 4.2 12/13/2022    POTASSIUM 3.6 11/02/2019     Lab Results   Component Value Date    CHLORIDE 105 09/28/2023    CHLORIDE 108 12/13/2022    CHLORIDE 106 11/02/2019     Lab Results   Component Value Date    APRIL 10.0 09/28/2023    APRIL 9.6 11/02/2019     Lab Results   Component Value Date    CO2 20 09/28/2023    CO2 26 12/13/2022    CO2 23 11/02/2019     Lab Results   Component Value Date    BUN 16.2 09/28/2023    BUN 17 12/13/2022    BUN 19 11/02/2019     Lab Results   Component Value Date    CR 0.74 09/28/2023    CR 0.65 11/02/2019     Lab Results   Component  Value Date     09/28/2023     12/13/2022     11/02/2019     Lab Results   Component Value Date    WBC 8.6 09/27/2023    WBC 6.9 11/02/2019     Lab Results   Component Value Date    RBC 5.07 09/27/2023    RBC 5.41 11/02/2019     Lab Results   Component Value Date    HGB 15.3 09/27/2023    HGB 16.6 11/02/2019     Lab Results   Component Value Date    HCT 44.7 09/27/2023    HCT 47.2 11/02/2019     Lab Results   Component Value Date    MCV 88 09/27/2023    MCV 87 11/02/2019     Lab Results   Component Value Date    MCH 30.2 09/27/2023    MCH 30.7 11/02/2019     Lab Results   Component Value Date    MCHC 34.2 09/27/2023    MCHC 35.2 11/02/2019     Lab Results   Component Value Date    RDW 12.8 09/27/2023    RDW 13.3 11/02/2019     Lab Results   Component Value Date     09/27/2023     11/02/2019             Thank you for allowing me to participate in the care of your patient.      Sincerely,     Ever Villalobos MD     [facility]  cc:   Ever Villalobos MD  [address]  [place] [id number]

## 2024-04-26 ENCOUNTER — TELEPHONE (OUTPATIENT)
Dept: CARDIOLOGY | Facility: CLINIC | Age: 74
End: 2024-04-26
Payer: COMMERCIAL

## 2024-04-26 NOTE — TELEPHONE ENCOUNTER
Patient states that BP has slowly been rising since stopping Carvedilol on 4/22/24, yesterday it was 147/72.  Yesterday she put herself back on Carvedilol and BP has come down to 120's.  Routing to provider to advise if patient should go back to original Carvedilol dosing.  Theodora Lewis RN on 4/26/2024 at 11:52 AM

## 2024-04-26 NOTE — TELEPHONE ENCOUNTER
[facility] Call Center    Phone Message    May a detailed message be left on voicemail: yes     Reason for Call: Other: Kalani called requesting to speak with her care team because she wanted to pass along her blood pressure readings after stopping her carvedilol. Kalani states she originally felt fine but then started to not feel well and when she took her blood pressure it read 147/72 the first time and then she took it a little later and it was 155/67. Kalani is wondering if she should start her carvedilol again. Please reach out to Kalani to discuss. Thank you!     Action Taken: Other: Cardiology    Travel Screening: Not Applicable    Thank you!  [facility]

## 2024-04-30 NOTE — TELEPHONE ENCOUNTER
Ever Villalobos MD Lidke, Jen M RN  Caller: Unspecified (4 days ago, 11:37 AM)  Okay to stay on the carvedilol if it did not make any difference in her fatigue.  If it did make a difference in her fatigue then we can add or change other antihypertensives instead.      Patient states no difference in fatigue on or off carvedilol.  Patient will resume medication.  Theodora Lewis, DONNA on 4/30/2024 at 11:10 AM

## 2024-07-18 ENCOUNTER — HOSPITAL ENCOUNTER (OUTPATIENT)
Dept: CT IMAGING | Facility: CLINIC | Age: 74
Discharge: HOME | End: 2024-07-18
Attending: FAMILY MEDICINE | Admitting: FAMILY MEDICINE
Payer: COMMERCIAL

## 2024-07-18 DIAGNOSIS — R79.89 ELEVATED LFTS: ICD-10-CM

## 2024-07-18 DIAGNOSIS — R10.84 ABDOMINAL PAIN, GENERALIZED: ICD-10-CM

## 2024-07-18 DIAGNOSIS — R19.5 LOOSE STOOLS: ICD-10-CM

## 2024-07-18 LAB
CREAT BLD-MCNC: 0.7 MG/DL (ref 0.5–1)
EGFRCR SERPLBLD CKD-EPI 2021: >60 ML/MIN/1.73M2

## 2024-07-18 PROCEDURE — 74177 CT ABD & PELVIS W/CONTRAST: CPT

## 2024-07-18 PROCEDURE — 82565 ASSAY OF CREATININE: CPT

## 2024-07-18 PROCEDURE — 250N000011 HC RX IP 250 OP 636

## 2024-07-18 RX ORDER — IOPAMIDOL 755 MG/ML
90 INJECTION, SOLUTION INTRAVASCULAR ONCE
Status: COMPLETED | OUTPATIENT
Start: 2024-07-18 | End: 2024-07-18

## 2024-07-18 RX ADMIN — IOPAMIDOL 90 ML: 755 INJECTION, SOLUTION INTRAVENOUS at 13:45

## 2024-09-17 ENCOUNTER — TELEPHONE (OUTPATIENT)
Dept: CARDIOLOGY | Facility: CLINIC | Age: 74
End: 2024-09-17
Payer: COMMERCIAL

## 2024-09-17 NOTE — TELEPHONE ENCOUNTER
[facility] Call Center    Phone Message    May a detailed message be left on voicemail: yes     Reason for Call: Other: pt insurance is stating that her copay should not be the $200 that the system is pulling and should only be $35. Pt does not have $200 to pay to see provider this Thursday. SAC put a note on the appt and is sending this TE to help with pts anxiety over the copay. Pt was going to cancel but SAC remembered the system glitch with copays that has been happening.      Action Taken: Other: cardiology     Travel Screening: Not Applicable      Thank you!  [facility]        Date of Service:

## 2024-09-17 NOTE — TELEPHONE ENCOUNTER
Spoke with patient and recommended she call the business office at  [phone] to have them look into her copay. Pt agreed.

## 2024-09-23 ENCOUNTER — OFFICE VISIT (OUTPATIENT)
Dept: CARDIOLOGY | Facility: CLINIC | Age: 74
End: 2024-09-23
Payer: COMMERCIAL

## 2024-09-23 VITALS
BODY MASS INDEX: 27.05 KG/M2 | HEIGHT: 59 IN | DIASTOLIC BLOOD PRESSURE: 84 MMHG | OXYGEN SATURATION: 99 % | HEART RATE: 59 BPM | SYSTOLIC BLOOD PRESSURE: 138 MMHG | WEIGHT: 134.2 LBS

## 2024-09-23 DIAGNOSIS — Z95.1 S/P CABG (CORONARY ARTERY BYPASS GRAFT): Primary | ICD-10-CM

## 2024-09-23 PROCEDURE — G2211 COMPLEX E/M VISIT ADD ON: HCPCS | Performed by: INTERNAL MEDICINE

## 2024-09-23 PROCEDURE — 99214 OFFICE O/P EST MOD 30 MIN: CPT | Performed by: INTERNAL MEDICINE

## 2024-09-23 NOTE — LETTER
"9/23/2024    BASHIR GUARDADO  [address]  [facility] [address]    RE: Kalani Rowan       Dear Colleague,     I had the pleasure of seeing Kalani Rowan in the [facility].  Cardiology Progress Note          Assessment and Plan:       Coronary artery disease status post CABG  Continue current medications.  Patient declines to increase her rosuvastatin.      Hypertension.  Adequate control  Continue current medications.      30 minutes was spent with the patient, precharting and reviewing tests as well as post visit charting all done today..    This note was transcribed using electronic voice recognition software and there may be typographical errors present.     The longitudinal plan of care for the diagnosis(es)/condition(s) as documented were addressed during this visit. Due to the added complexity in care, I will continue to support Kalani in the subsequent management and with ongoing continuity of care.                    Interval History:     The patient is a very pleasant 73 year old whom I have been following along with her .  She has coronary artery disease status post CABG.  She has fatigue that is chronic.  It did not change with holding her carvedilol.  She felt better off her rosuvastatin but reluctantly resumed it.  She declines to increase the dose.                     Review of Systems:     Review of Systems:  Skin:        Eyes:       ENT:       Respiratory:  Negative    Cardiovascular:    palpitations;Positive for;fatigue;chest pain  Gastroenterology:      Genitourinary:       Musculoskeletal:       Neurologic:       Psychiatric:       Heme/Lymph/Imm:       Endocrine:                   Physical Exam:     Vitals: /84 (BP Location: Right arm, Patient Position: Sitting, Cuff Size: Adult Regular)   Pulse 59   Ht 1.499 m (4' 11\")   Wt 60.9 kg (134 lb 3.2 oz)   LMP  (LMP Unknown)   SpO2 99%   BMI 27.11 kg/m    Constitutional:  cooperative, alert and " oriented, well developed, well nourished, in no acute distress        Skin:  warm and dry to the touch, no apparent skin lesions or masses noted        Head:  normocephalic, no masses or lesions        Eyes:  pupils equal and round, conjunctivae and lids unremarkable, sclera white, no xanthalasma, EOMS intact, no nystagmus        Chest:  normal symmetry        Cardiac: regular rhythm;normal S1 and S2     no presence of murmur            Extremities and Back:  no deformities, clubbing, cyanosis, erythema observed        Neurological:  no gross motor deficits;affect appropriate                 Medications:     Current Outpatient Medications   Medication Sig Dispense Refill     acetaminophen (TYLENOL) 325 MG tablet Take 2 tablets (650 mg) by mouth every 6 hours as needed for other (For optimal non-opioid multimodal pain management to improve pain control.) 100 tablet 0     albuterol (PROAIR HFA) 108 (90 Base) MCG/ACT inhaler Inhale 1-2 puffs into the lungs every 6 hours as needed for shortness of breath 18 g 4     aspirin (ASA) 81 MG chewable tablet Take 2 tablets (162 mg) by mouth daily 180 tablet 3     carvedilol (COREG) 6.25 MG tablet Take 1 tablet (6.25 mg) by mouth 2 times daily (with meals) 180 tablet 3     Cholecalciferol (VITAMIN D3) 1.25 MG (19953 UT) TABS Take 5,000 mcg by mouth daily       Coenzyme Q10 (COQ10) 100 MG CAPS Take 100 mg by mouth daily       fluticasone (FLONASE) 50 MCG/ACT nasal spray Spray 50 mcg in nostril as needed for allergies       Multiple Vitamin (MULTIVITAMIN ADULT PO) Take 1 tablet by mouth daily       Multiple Vitamins-Minerals (PRESERVISION AREDS) CAPS Take 1 capsule by mouth daily       multivitamin w/minerals (THERA-VIT-M) tablet Take 1 tablet by mouth daily       nitroGLYcerin (NITROSTAT) 0.4 MG sublingual tablet For chest pain place 1 tablet under the tongue every 5 minutes for 3 doses. If symptoms persist 5 minutes after 1st dose call 911. 20 tablet 3     polyethylene glycol  (MIRALAX) 17 GM/Dose powder Take 17 g by mouth daily as needed for constipation 510 g 0     rosuvastatin (CRESTOR) 10 MG tablet Take 10 mg by mouth daily                  Data:   All laboratory data reviewed  No results found for this or any previous visit (from the past 24 hour(s)).    All laboratory data reviewed  Lab Results   Component Value Date    CHOL 265 09/28/2023    CHOL 377 11/02/2019     Lab Results   Component Value Date    HDL 49 09/28/2023    HDL 54 11/02/2019     Lab Results   Component Value Date     09/28/2023    LDL  11/02/2019     Cannot estimate LDL when triglyceride exceeds 400 mg/dL     11/02/2019     Lab Results   Component Value Date    TRIG 254 09/28/2023    TRIG 547 11/02/2019     Lab Results   Component Value Date    CHOLHDLRATIO 7.4 11/18/2013     TSH   Date Value Ref Range Status   09/28/2023 4.67 (H) 0.30 - 4.20 uIU/mL Final   12/13/2022 7.62 (H) 0.40 - 4.00 mU/L Final   11/02/2019 3.94 0.40 - 4.00 mU/L Final     Last Basic Metabolic Panel:  Lab Results   Component Value Date     09/28/2023     11/02/2019      Lab Results   Component Value Date    POTASSIUM 3.7 09/28/2023    POTASSIUM 4.2 12/13/2022    POTASSIUM 3.6 11/02/2019     Lab Results   Component Value Date    CHLORIDE 105 09/28/2023    CHLORIDE 108 12/13/2022    CHLORIDE 106 11/02/2019     Lab Results   Component Value Date    APRIL 10.0 09/28/2023    APRIL 9.6 11/02/2019     Lab Results   Component Value Date    CO2 20 09/28/2023    CO2 26 12/13/2022    CO2 23 11/02/2019     Lab Results   Component Value Date    BUN 16.2 09/28/2023    BUN 17 12/13/2022    BUN 19 11/02/2019     Lab Results   Component Value Date    CR 0.7 07/18/2024    CR 0.74 09/28/2023    CR 0.65 11/02/2019     Lab Results   Component Value Date     09/28/2023     12/13/2022     11/02/2019     Lab Results   Component Value Date    WBC 8.6 09/27/2023    WBC 6.9 11/02/2019     Lab Results   Component Value Date    RBC  5.07 09/27/2023    RBC 5.41 11/02/2019     Lab Results   Component Value Date    HGB 15.3 09/27/2023    HGB 16.6 11/02/2019     Lab Results   Component Value Date    HCT 44.7 09/27/2023    HCT 47.2 11/02/2019     Lab Results   Component Value Date    MCV 88 09/27/2023    MCV 87 11/02/2019     Lab Results   Component Value Date    MCH 30.2 09/27/2023    MCH 30.7 11/02/2019     Lab Results   Component Value Date    MCHC 34.2 09/27/2023    MCHC 35.2 11/02/2019     Lab Results   Component Value Date    RDW 12.8 09/27/2023    RDW 13.3 11/02/2019     Lab Results   Component Value Date     09/27/2023     11/02/2019               Thank you for allowing me to participate in the care of your patient.      Sincerely,     Ever Villalobos MD     [facility]  cc:   Referred Self, MD  No address on file

## 2024-09-23 NOTE — PROGRESS NOTES
"Cardiology Progress Note          Assessment and Plan:       Coronary artery disease status post CABG  Continue current medications.  Patient declines to increase her rosuvastatin.      Hypertension.  Adequate control  Continue current medications.      30 minutes was spent with the patient, precharting and reviewing tests as well as post visit charting all done today..    This note was transcribed using electronic voice recognition software and there may be typographical errors present.     The longitudinal plan of care for the diagnosis(es)/condition(s) as documented were addressed during this visit. Due to the added complexity in care, I will continue to support Kalani in the subsequent management and with ongoing continuity of care.                    Interval History:     The patient is a very pleasant 73 year old whom I have been following along with her .  She has coronary artery disease status post CABG.  She has fatigue that is chronic.  It did not change with holding her carvedilol.  She felt better off her rosuvastatin but reluctantly resumed it.  She declines to increase the dose.                     Review of Systems:     Review of Systems:  Skin:        Eyes:       ENT:       Respiratory:  Negative    Cardiovascular:    palpitations;Positive for;fatigue;chest pain  Gastroenterology:      Genitourinary:       Musculoskeletal:       Neurologic:       Psychiatric:       Heme/Lymph/Imm:       Endocrine:                   Physical Exam:     Vitals: /84 (BP Location: Right arm, Patient Position: Sitting, Cuff Size: Adult Regular)   Pulse 59   Ht 1.499 m (4' 11\")   Wt 60.9 kg (134 lb 3.2 oz)   LMP  (LMP Unknown)   SpO2 99%   BMI 27.11 kg/m    Constitutional:  cooperative, alert and oriented, well developed, well nourished, in no acute distress        Skin:  warm and dry to the touch, no apparent skin lesions or masses noted        Head:  normocephalic, no masses or lesions        Eyes:  " pupils equal and round, conjunctivae and lids unremarkable, sclera white, no xanthalasma, EOMS intact, no nystagmus        Chest:  normal symmetry        Cardiac: regular rhythm;normal S1 and S2     no presence of murmur            Extremities and Back:  no deformities, clubbing, cyanosis, erythema observed        Neurological:  no gross motor deficits;affect appropriate                 Medications:     Current Outpatient Medications   Medication Sig Dispense Refill    acetaminophen (TYLENOL) 325 MG tablet Take 2 tablets (650 mg) by mouth every 6 hours as needed for other (For optimal non-opioid multimodal pain management to improve pain control.) 100 tablet 0    albuterol (PROAIR HFA) 108 (90 Base) MCG/ACT inhaler Inhale 1-2 puffs into the lungs every 6 hours as needed for shortness of breath 18 g 4    aspirin (ASA) 81 MG chewable tablet Take 2 tablets (162 mg) by mouth daily 180 tablet 3    carvedilol (COREG) 6.25 MG tablet Take 1 tablet (6.25 mg) by mouth 2 times daily (with meals) 180 tablet 3    Cholecalciferol (VITAMIN D3) 1.25 MG (41835 UT) TABS Take 5,000 mcg by mouth daily      Coenzyme Q10 (COQ10) 100 MG CAPS Take 100 mg by mouth daily      fluticasone (FLONASE) 50 MCG/ACT nasal spray Spray 50 mcg in nostril as needed for allergies      Multiple Vitamin (MULTIVITAMIN ADULT PO) Take 1 tablet by mouth daily      Multiple Vitamins-Minerals (PRESERVISION AREDS) CAPS Take 1 capsule by mouth daily      multivitamin w/minerals (THERA-VIT-M) tablet Take 1 tablet by mouth daily      nitroGLYcerin (NITROSTAT) 0.4 MG sublingual tablet For chest pain place 1 tablet under the tongue every 5 minutes for 3 doses. If symptoms persist 5 minutes after 1st dose call 911. 20 tablet 3    polyethylene glycol (MIRALAX) 17 GM/Dose powder Take 17 g by mouth daily as needed for constipation 510 g 0    rosuvastatin (CRESTOR) 10 MG tablet Take 10 mg by mouth daily                  Data:   All laboratory data reviewed  No results  found for this or any previous visit (from the past 24 hour(s)).    All laboratory data reviewed  Lab Results   Component Value Date    CHOL 265 09/28/2023    CHOL 377 11/02/2019     Lab Results   Component Value Date    HDL 49 09/28/2023    HDL 54 11/02/2019     Lab Results   Component Value Date     09/28/2023    LDL  11/02/2019     Cannot estimate LDL when triglyceride exceeds 400 mg/dL     11/02/2019     Lab Results   Component Value Date    TRIG 254 09/28/2023    TRIG 547 11/02/2019     Lab Results   Component Value Date    CHOLHDLRATIO 7.4 11/18/2013     TSH   Date Value Ref Range Status   09/28/2023 4.67 (H) 0.30 - 4.20 uIU/mL Final   12/13/2022 7.62 (H) 0.40 - 4.00 mU/L Final   11/02/2019 3.94 0.40 - 4.00 mU/L Final     Last Basic Metabolic Panel:  Lab Results   Component Value Date     09/28/2023     11/02/2019      Lab Results   Component Value Date    POTASSIUM 3.7 09/28/2023    POTASSIUM 4.2 12/13/2022    POTASSIUM 3.6 11/02/2019     Lab Results   Component Value Date    CHLORIDE 105 09/28/2023    CHLORIDE 108 12/13/2022    CHLORIDE 106 11/02/2019     Lab Results   Component Value Date    APRIL 10.0 09/28/2023    APRIL 9.6 11/02/2019     Lab Results   Component Value Date    CO2 20 09/28/2023    CO2 26 12/13/2022    CO2 23 11/02/2019     Lab Results   Component Value Date    BUN 16.2 09/28/2023    BUN 17 12/13/2022    BUN 19 11/02/2019     Lab Results   Component Value Date    CR 0.7 07/18/2024    CR 0.74 09/28/2023    CR 0.65 11/02/2019     Lab Results   Component Value Date     09/28/2023     12/13/2022     11/02/2019     Lab Results   Component Value Date    WBC 8.6 09/27/2023    WBC 6.9 11/02/2019     Lab Results   Component Value Date    RBC 5.07 09/27/2023    RBC 5.41 11/02/2019     Lab Results   Component Value Date    HGB 15.3 09/27/2023    HGB 16.6 11/02/2019     Lab Results   Component Value Date    HCT 44.7 09/27/2023    HCT 47.2 11/02/2019     Lab  Results   Component Value Date    MCV 88 09/27/2023    MCV 87 11/02/2019     Lab Results   Component Value Date    MCH 30.2 09/27/2023    MCH 30.7 11/02/2019     Lab Results   Component Value Date    MCHC 34.2 09/27/2023    MCHC 35.2 11/02/2019     Lab Results   Component Value Date    RDW 12.8 09/27/2023    RDW 13.3 11/02/2019     Lab Results   Component Value Date     09/27/2023     11/02/2019

## 2024-10-17 ENCOUNTER — HOSPITAL ENCOUNTER (OUTPATIENT)
Dept: ULTRASOUND IMAGING | Facility: CLINIC | Age: 74
Discharge: HOME | End: 2024-10-17
Attending: INTERNAL MEDICINE | Admitting: INTERNAL MEDICINE
Payer: COMMERCIAL

## 2024-10-17 DIAGNOSIS — R12 HEARTBURN: ICD-10-CM

## 2024-10-17 DIAGNOSIS — K59.04 CHRONIC IDIOPATHIC CONSTIPATION: ICD-10-CM

## 2024-10-17 DIAGNOSIS — A04.8 H. PYLORI INFECTION: ICD-10-CM

## 2024-10-17 DIAGNOSIS — R74.01 ELEVATED AST (SGOT): ICD-10-CM

## 2024-10-17 DIAGNOSIS — K44.9 HIATAL HERNIA: ICD-10-CM

## 2024-10-17 PROCEDURE — 76700 US EXAM ABDOM COMPLETE: CPT

## 2024-10-23 ENCOUNTER — TELEPHONE (OUTPATIENT)
Dept: CARDIOLOGY | Facility: CLINIC | Age: 74
End: 2024-10-23
Payer: COMMERCIAL

## 2024-10-23 NOTE — TELEPHONE ENCOUNTER
[facility] Call Center    Phone Message    May a detailed message be left on voicemail: yes     Reason for Call: Other: Pt would like to speak to someone from their care team. Please call the pt to discuss further.      Action Taken: Other: Cardiology     Travel Screening: Not Applicable     Thank you!  [facility]

## 2024-10-23 NOTE — TELEPHONE ENCOUNTER
Spoke with patient and she stated she had a visit with a Gastroenterologist who ordered an abdominal US and the result recommended a MRCP and MRI. Pt wants to make sure Dr. Villalobos has no concerns about that with her history of CABG and wires they may have used. (Recommended patient to discuss with CV[facility] but she insisted on reviewing with Dr. Villalobos first).     IMPRESSION:  1.  Mild extrahepatic biliary duct dilatation to 9 mm. Consider  follow-up MRCP.  2.  A 2.2 cm cyst in the midright kidney with possible wall thickening  versus artifact. Attention on MRI.  3.  Mild hepatic steatosis.     HOWIE PINEDA MD     Will route to Dr. Villalobos to review.

## 2024-11-12 ENCOUNTER — HOSPITAL ENCOUNTER (OUTPATIENT)
Dept: MRI IMAGING | Facility: CLINIC | Age: 74
Discharge: HOME | End: 2024-11-12
Attending: INTERNAL MEDICINE
Payer: COMMERCIAL

## 2024-11-12 DIAGNOSIS — K83.8 DILATED BILE DUCT: ICD-10-CM

## 2024-11-12 DIAGNOSIS — N28.1 RENAL CYST, RIGHT: ICD-10-CM

## 2024-11-12 PROCEDURE — A9585 GADOBUTROL INJECTION: HCPCS | Performed by: INTERNAL MEDICINE

## 2024-11-12 PROCEDURE — 255N000002 HC RX 255 OP 636: Performed by: INTERNAL MEDICINE

## 2024-11-12 PROCEDURE — 74183 MRI ABD W/O CNTR FLWD CNTR: CPT

## 2024-11-12 RX ORDER — GADOBUTROL 604.72 MG/ML
0.1 INJECTION INTRAVENOUS ONCE
Status: COMPLETED | OUTPATIENT
Start: 2024-11-12 | End: 2024-11-12

## 2024-11-12 RX ADMIN — GADOBUTROL 6.09 ML: 604.72 INJECTION INTRAVENOUS at 10:29

## 2024-12-29 ENCOUNTER — HEALTH MAINTENANCE LETTER (OUTPATIENT)
Age: 74
End: 2024-12-29

## 2025-03-29 ENCOUNTER — HEALTH MAINTENANCE LETTER (OUTPATIENT)
Age: 75
End: 2025-03-29

## 2025-05-20 DIAGNOSIS — I10 ESSENTIAL (PRIMARY) HYPERTENSION: ICD-10-CM

## 2025-05-20 RX ORDER — CARVEDILOL 6.25 MG/1
6.25 TABLET ORAL 2 TIMES DAILY WITH MEALS
Qty: 180 TABLET | Refills: 1 | Status: SHIPPED | OUTPATIENT
Start: 2025-05-20

## (undated) DEVICE — SU SILK 1 TIE 10X30" SA87G

## (undated) DEVICE — SU ETHIBOND 2-0 SHDA 30" X563H

## (undated) DEVICE — PROBE TEMPERATURE MYOCARDIAL 30MMX22GA MTS-40030

## (undated) DEVICE — SU VICRYL 2-0 CT-1 27" UND J259H

## (undated) DEVICE — SU STEEL 6 CCS 4X18" M654G

## (undated) DEVICE — SU STRATAFIX 2-0 MH 36" SXPD2B412

## (undated) DEVICE — SU PROLENE 6-0 C-1DA 30" M8706

## (undated) DEVICE — LEAD PACER MYOCARDIAL BIPOLAR TEMPORARY 53CM 6495F

## (undated) DEVICE — DRSG KERLIX 4 1/2"X4YDS ROLL 6715

## (undated) DEVICE — SU PLEDGET SOFT TFE 3/8"X3/26"X1/16" PCP40

## (undated) DEVICE — SYR EAR BULB 3OZ 0035830

## (undated) DEVICE — BONE WAX 2.5GM W31G

## (undated) DEVICE — SLEEVE TR BAND RADIAL COMPRESSION DEVICE 24CM TRB24-REG

## (undated) DEVICE — PROTECTOR ARM ONE-STEP TRENDELENBURG 40418

## (undated) DEVICE — PACK TUBING MINI VAC CUSTOM 3/8X3/8T BB7V94R1

## (undated) DEVICE — LINEN LEG ROLL 5489

## (undated) DEVICE — PACK OPEN HEART PV12OH524

## (undated) DEVICE — ESU PENCIL W/HOLSTER E2350H

## (undated) DEVICE — CATH ANGIO JUDKINS JL4 6FRX100CM INFINITI 534620T

## (undated) DEVICE — DRAPE IOBAN INCISE 36X23" 6651EZ

## (undated) DEVICE — PREP CHLORAPREP W/ORANGE TINT 10.5ML 930715

## (undated) DEVICE — SU PROLENE 4-0 RB-1DA 36" 8557H

## (undated) DEVICE — TOTE ANGIO CORP PC15AT SAN32CC83O

## (undated) DEVICE — INTRO GLIDESHEATH SLENDER 6FR 10X45CM 60-1060

## (undated) DEVICE — KIT ENDO VASOVIEW HEMOPRO 2 VH-4000

## (undated) DEVICE — BLOWER/MISTER CLEARVIEW 22150

## (undated) DEVICE — CATH ANGIO JUDKINS R4 6FRX100CM INFINITI 534621T

## (undated) DEVICE — SOL WATER IRRIG 1000ML BOTTLE 2F7114

## (undated) DEVICE — SU STRATAFIX PDS PLUS 0 CT-1 18" SXPP1A401

## (undated) DEVICE — SU PROLENE 7-0 BV-1DA 4X24" M8702

## (undated) DEVICE — DEVICE TISSUE STABILIZATION OCTOBASE 28707

## (undated) DEVICE — KIT HAND CONTROL ANGIOTOUCH ACIST 65CM AT-P65

## (undated) DEVICE — CLIP HORIZON MULTI SM YELLOW 001204

## (undated) DEVICE — Device

## (undated) DEVICE — LINEN TOWEL PACK X30 5481

## (undated) DEVICE — PUNCH AORTIC 4.0MMX8" RCB40

## (undated) DEVICE — ADPT PERFUSION MULTIPLE

## (undated) DEVICE — COVER TABLE POLY 65X90" 8186

## (undated) DEVICE — DEFIB PRO-PADZ LVP LQD GEL ADULT 8900-2105-01

## (undated) DEVICE — LINEN TOWEL PACK X5 5464

## (undated) DEVICE — SOMASENSOR CEREBRAL OXIMETER ADULT SAFB-SM

## (undated) DEVICE — SU MONOCRYL 4-0 PS-2 18" UND Y496G

## (undated) DEVICE — CANNULA PERFUSION AORTIC ROOT 22FR 20012

## (undated) DEVICE — SUCTION CANISTER MEDIVAC LINER 3000ML W/LID 65651-530

## (undated) DEVICE — SU ETHIBOND 3-0 BBDA 36" X588H

## (undated) DEVICE — LINEN TOWEL PACK X6 WHITE 5487

## (undated) DEVICE — ANTIFOG SOLUTION W/FOAM PAD CF-1001

## (undated) DEVICE — DRAIN CHEST TUBE 32FR STR 8032

## (undated) DEVICE — RESERVOIR CELL SAVING BLOOD COLLECTION EL2120

## (undated) DEVICE — SU PROLENE 4-0 SHDA 36" 8521H

## (undated) DEVICE — GLOVE BIOGEL PI ULTRATOUCH G SZ 7.0 42170

## (undated) DEVICE — DEVICE ASSEMBLY SUCTION/ANTI COAG BTC93

## (undated) DEVICE — RX SURGIFLO HEMOSTATIC MATRIX W/THROMBIN 8ML 2994

## (undated) DEVICE — GLIDEWIRE TERUMO .035X180CM 1.5,, J-TIP GR3525

## (undated) DEVICE — CLIP HORIZON MULTI MED BLUE 002204

## (undated) DEVICE — BLADE KNIFE BEAVER MINI STR BEAVER6900

## (undated) DEVICE — CABLE MYO/LEAD PACING WHITE DISP 019-530

## (undated) DEVICE — MANIFOLD KIT ANGIO AUTOMATED 014613

## (undated) DEVICE — SOL NACL 0.9% IRRIG 1000ML BOTTLE 2F7124

## (undated) DEVICE — SU STEEL MYO/WIRE II STERNOTOMY 8 BE-1 3X14" 048-217

## (undated) DEVICE — DRAIN CHEST TUBE RIGHT ANGLED 28FR 8128

## (undated) DEVICE — KIT WASH CELL SAVING ATL2001

## (undated) DEVICE — CATH ANGIO INFINITI PIGTAIL 145 6 SH 6FRX110CM  534-652S

## (undated) DEVICE — LINEN GOWN OVERSIZE 5408

## (undated) DEVICE — SU ETHIBOND 0 CT-1 CR 8X18" CX21D

## (undated) DEVICE — POSITIONER ASSIST ESSTECH 3S T401210S

## (undated) DEVICE — SU STRATAFIX MONOCRYL 4-0 SPIRAL 30CM PS-2 SXMP1B117

## (undated) DEVICE — WIRE GUIDE 0.035"X260CM SAFE-T-J EXCHANGE G00517

## (undated) DEVICE — DECANTER BAG 2002S

## (undated) DEVICE — PACK MINI VAC CUSTOM CARDOPULMONARY BB5Z97R15

## (undated) DEVICE — ESU ELEC BLADE 6" COATED/INSULATED E1455-6

## (undated) DEVICE — SOL WATER IRRIG 1000ML BOTTLE 07139-09

## (undated) RX ORDER — HEPARIN SODIUM 1000 [USP'U]/ML
INJECTION, SOLUTION INTRAVENOUS; SUBCUTANEOUS
Status: DISPENSED
Start: 2022-09-02

## (undated) RX ORDER — PAPAVERINE HYDROCHLORIDE 30 MG/ML
INJECTION INTRAMUSCULAR; INTRAVENOUS
Status: DISPENSED
Start: 2022-09-02

## (undated) RX ORDER — FENTANYL CITRATE 50 UG/ML
INJECTION, SOLUTION INTRAMUSCULAR; INTRAVENOUS
Status: DISPENSED
Start: 2022-09-02

## (undated) RX ORDER — LIDOCAINE HYDROCHLORIDE 10 MG/ML
INJECTION, SOLUTION EPIDURAL; INFILTRATION; INTRACAUDAL; PERINEURAL
Status: DISPENSED
Start: 2022-09-01

## (undated) RX ORDER — PROTAMINE SULFATE 10 MG/ML
INJECTION, SOLUTION INTRAVENOUS
Status: DISPENSED
Start: 2022-09-02

## (undated) RX ORDER — NITROGLYCERIN 5 MG/ML
VIAL (ML) INTRAVENOUS
Status: DISPENSED
Start: 2022-09-01

## (undated) RX ORDER — CEFAZOLIN SODIUM 1 G/3ML
INJECTION, POWDER, FOR SOLUTION INTRAMUSCULAR; INTRAVENOUS
Status: DISPENSED
Start: 2022-09-02

## (undated) RX ORDER — HEPARIN SODIUM 200 [USP'U]/100ML
INJECTION, SOLUTION INTRAVENOUS
Status: DISPENSED
Start: 2022-09-01

## (undated) RX ORDER — PROPOFOL 10 MG/ML
INJECTION, EMULSION INTRAVENOUS
Status: DISPENSED
Start: 2022-09-02

## (undated) RX ORDER — CHLORHEXIDINE GLUCONATE ORAL RINSE 1.2 MG/ML
SOLUTION DENTAL
Status: DISPENSED
Start: 2022-09-02

## (undated) RX ORDER — MAGNESIUM SULFATE HEPTAHYDRATE 500 MG/ML
INJECTION, SOLUTION INTRAMUSCULAR; INTRAVENOUS
Status: DISPENSED
Start: 2022-09-02

## (undated) RX ORDER — FENTANYL CITRATE 50 UG/ML
INJECTION, SOLUTION INTRAMUSCULAR; INTRAVENOUS
Status: DISPENSED
Start: 2022-09-01

## (undated) RX ORDER — HEPARIN SODIUM 1000 [USP'U]/ML
INJECTION, SOLUTION INTRAVENOUS; SUBCUTANEOUS
Status: DISPENSED
Start: 2022-09-01

## (undated) RX ORDER — VERAPAMIL HYDROCHLORIDE 2.5 MG/ML
INJECTION, SOLUTION INTRAVENOUS
Status: DISPENSED
Start: 2022-09-01